# Patient Record
Sex: MALE | Race: WHITE | HISPANIC OR LATINO | Employment: UNEMPLOYED | URBAN - METROPOLITAN AREA
[De-identification: names, ages, dates, MRNs, and addresses within clinical notes are randomized per-mention and may not be internally consistent; named-entity substitution may affect disease eponyms.]

---

## 2019-01-02 ENCOUNTER — APPOINTMENT (EMERGENCY)
Dept: CT IMAGING | Facility: HOSPITAL | Age: 49
End: 2019-01-02
Payer: COMMERCIAL

## 2019-01-02 ENCOUNTER — HOSPITAL ENCOUNTER (EMERGENCY)
Facility: HOSPITAL | Age: 49
Discharge: HOME | End: 2019-01-02
Attending: EMERGENCY MEDICINE | Admitting: EMERGENCY MEDICINE
Payer: COMMERCIAL

## 2019-01-02 ENCOUNTER — APPOINTMENT (EMERGENCY)
Dept: RADIOLOGY | Facility: HOSPITAL | Age: 49
End: 2019-01-02
Payer: COMMERCIAL

## 2019-01-02 VITALS
BODY MASS INDEX: 34.02 KG/M2 | SYSTOLIC BLOOD PRESSURE: 140 MMHG | RESPIRATION RATE: 20 BRPM | TEMPERATURE: 99.6 F | HEART RATE: 94 BPM | DIASTOLIC BLOOD PRESSURE: 73 MMHG | HEIGHT: 64 IN | OXYGEN SATURATION: 95 % | WEIGHT: 199.3 LBS

## 2019-01-02 DIAGNOSIS — R05.9 COUGH: ICD-10-CM

## 2019-01-02 DIAGNOSIS — M79.10 MYALGIA: ICD-10-CM

## 2019-01-02 DIAGNOSIS — R51.9 HEADACHE: ICD-10-CM

## 2019-01-02 DIAGNOSIS — J11.1 INFLUENZA: Primary | ICD-10-CM

## 2019-01-02 PROCEDURE — 96375 TX/PRO/DX INJ NEW DRUG ADDON: CPT

## 2019-01-02 PROCEDURE — 96374 THER/PROPH/DIAG INJ IV PUSH: CPT

## 2019-01-02 PROCEDURE — 71046 X-RAY EXAM CHEST 2 VIEWS: CPT

## 2019-01-02 PROCEDURE — 99284 EMERGENCY DEPT VISIT MOD MDM: CPT

## 2019-01-02 PROCEDURE — 96361 HYDRATE IV INFUSION ADD-ON: CPT

## 2019-01-02 PROCEDURE — 70450 CT HEAD/BRAIN W/O DYE: CPT

## 2019-01-02 RX ORDER — OSELTAMIVIR PHOSPHATE 75 MG/1
75 CAPSULE ORAL EVERY 12 HOURS
Qty: 10 CAPSULE | Refills: 0 | Status: SHIPPED | OUTPATIENT
Start: 2019-01-02 | End: 2019-01-07

## 2019-01-02 RX ORDER — METOCLOPRAMIDE HYDROCHLORIDE 5 MG/ML
10 INJECTION INTRAMUSCULAR; INTRAVENOUS ONCE
Status: COMPLETED | OUTPATIENT
Start: 2019-01-02 | End: 2019-01-02

## 2019-01-02 RX ORDER — METOCLOPRAMIDE 10 MG/1
10 TABLET ORAL EVERY 6 HOURS
Qty: 30 TABLET | Refills: 0 | Status: SHIPPED | OUTPATIENT
Start: 2019-01-02 | End: 2020-10-20

## 2019-01-02 RX ORDER — DIPHENHYDRAMINE HYDROCHLORIDE 50 MG/ML
50 INJECTION INTRAMUSCULAR; INTRAVENOUS ONCE
Status: COMPLETED | OUTPATIENT
Start: 2019-01-02 | End: 2019-01-02

## 2019-01-02 RX ORDER — KETOROLAC TROMETHAMINE 30 MG/ML
15 INJECTION, SOLUTION INTRAMUSCULAR; INTRAVENOUS ONCE
Status: COMPLETED | OUTPATIENT
Start: 2019-01-02 | End: 2019-01-02

## 2019-01-02 RX ADMIN — KETOROLAC TROMETHAMINE 15 MG: 30 INJECTION, SOLUTION INTRAMUSCULAR at 17:21

## 2019-01-02 RX ADMIN — SODIUM CHLORIDE 1000 ML: 0.9 INJECTION, SOLUTION INTRAVENOUS at 17:20

## 2019-01-02 RX ADMIN — METOCLOPRAMIDE 10 MG: 5 INJECTION, SOLUTION INTRAMUSCULAR; INTRAVENOUS at 17:24

## 2019-01-02 RX ADMIN — DIPHENHYDRAMINE HYDROCHLORIDE 50 MG: 50 INJECTION INTRAMUSCULAR; INTRAVENOUS at 17:20

## 2019-01-02 NOTE — DISCHARGE INSTRUCTIONS
Influenza   WHAT YOU NEED TO KNOW:   Influenza (the flu) is an infection caused by the influenza virus  The flu is easily spread when an infected person coughs, sneezes, or has close contact with others  You may be able to spread the flu to others for 1 week or longer after signs or symptoms appear  DISCHARGE INSTRUCTIONS:   Call 911 for any of the following:   · You have trouble breathing, and your lips look purple or blue  · You have a seizure  Return to the emergency department if:   · You are dizzy, or you are urinating less or not at all  · You have a headache with a stiff neck, and you feel tired or confused  · You have new pain or pressure in your chest     · Your symptoms, such as shortness of breath, vomiting, or diarrhea, get worse  · Your symptoms, such as fever and coughing, seem to get better, but then get worse  Contact your healthcare provider if:   · You have new muscle pain or weakness  · You have questions or concerns about your condition or care  Medicines: You may need any of the following:  · Acetaminophen  decreases pain and fever  It is available without a doctor's order  Ask how much to take and how often to take it  Follow directions  Acetaminophen can cause liver damage if not taken correctly  · NSAIDs , such as ibuprofen, help decrease swelling, pain, and fever  This medicine is available with or without a doctor's order  NSAIDs can cause stomach bleeding or kidney problems in certain people  If you take blood thinner medicine, always ask your healthcare provider if NSAIDs are safe for you  Always read the medicine label and follow directions  · Antivirals  help fight a viral infection  · Take your medicine as directed  Contact your healthcare provider if you think your medicine is not helping or if you have side effects  Tell him or her if you are allergic to any medicine  Keep a list of the medicines, vitamins, and herbs you take   Include the amounts, and when and why you take them  Bring the list or the pill bottles to follow-up visits  Carry your medicine list with you in case of an emergency  Rest  as much as you can to help you recover  Drink liquids as directed  to help prevent dehydration  Ask how much liquid to drink each day and which liquids are best for you  Prevent the spread of influenza:   · Wash your hands often  Use soap and water  Wash your hands after you use the bathroom, change a child's diapers, or sneeze  Wash your hands before you prepare or eat food  Use gel hand cleanser when soap and water are not available  Do not touch your eyes, nose, or mouth unless you have washed your hands first            · Cover your mouth when you sneeze or cough  Cough into a tissue or the bend of your arm  · Clean shared items with a germ-killing   Clean table surfaces, doorknobs, and light switches  Do not share towels, silverware, and dishes with people who are sick  Wash bed sheets, towels, silverware, and dishes with soap and water  · Wear a mask  over your mouth and nose if you are sick or are near anyone who is sick  · Stay away from others  if you are sick  · Influenza vaccine  helps prevent influenza (flu)  Everyone older than 6 months should get a yearly influenza vaccine  Get the vaccine as soon as it is available, usually in September or October each year  Follow up with your healthcare provider as directed:  Write down your questions so you remember to ask them during your visits  © 2017 [address] Information is for End User's use only and may not be sold, redistributed or otherwise used for commercial purposes  All illustrations and images included in CareNotes® are the copyrighted property of [employer] , [employer]  or Louis Knapp  The above information is an  only  It is not intended as medical advice for individual conditions or treatments   Talk to your doctor, nurse or pharmacist before following any medical regimen to see if it is safe and effective for you  Influenza   LO QUE NECESITA SABER:   La influenza (gripe) es miki infección provocada por el virus de la influenza  La gripe se propaga fácilmente cuando miki persona infectada tose, estornuda o tiene contacto cercano con otras personas  Usted podría propagar la gripe a otras personas por miki semana o más después de que aparezcan los signos o síntomas  INSTRUCCIONES SOBRE [facility]ITAL[facility]:   Llame al 911 en magen de presentar lo siguiente:   · Tiene dificultad para respirar, y kenya labios lucen púrpuras o azules  · Usted sufre miki convulsión  Regrese a la adan de emergencias si:   · Usted se siente mareado, orina poco o no orina  · Usted tiene dolor de pravin con rigidez en el darrick y se siente cansado o confundido  · Usted comienza a sentir dolor o presión en el pecho  · [place] síntomas, tierra falta de [place], vómito o [place], [place]  · [place] síntomas, tierra fiebre y tos, parecen mejorar zachary Gregoryswathi Almanza  Pregúntele a bonds Kunal Roses vitaminas y minerales son adecuados para usted  · Usted tiene dolor muscular o debilidad nuevos  · Usted tiene preguntas o inquietudes acerca de bonds condición o cuidado  Medicamentos:  Es posible que usted necesite alguno de los siguientes:  · El acetaminofén  rafael el dolor y baja la fiebre  Está disponible sin receta médica  Pregunte la cantidad y la frecuencia con que debe tomarlos  Orquidea Eric5  El acetaminofén puede causar daño en el hígado cuando no se tomer de forma correcta  · AINEs (Analgésicos antiinflamatorios no esteroides) tierra el ibuprofeno, ayudan a disminuir la inflamación, el dolor y la [place]  Juliana medicamento esta disponible con o sin miki receta médica  Los AINEs pueden causar sangrado estomacal o problemas renales en ciertas personas  Si usted tomer un medicamento anticoagulante, siempre pregúntele a bonds médico si los UZAIR son seguros para usted   Siempre mac la etiqueta de geovany medicamento y [place] instrucciones  · Los antivirales  ayudan a combatir miki infección viral     · [place] mirna medicamentos tierra se le haya indicado  Consulte con bonds médico si usted marisa que bonds medicamento no le está ayudando o si presenta efectos secundarios  Infórmele si es alérgico a cualquier medicamento  Mantenga miki lista actualizada de los [employer], las vitaminas y los productos herbales que tomer  Incluya los siguientes datos de los medicamentos: cantidad, frecuencia y motivo de administración  Traiga con usted la lista o los envases de la píldoras a mirna citas de seguimiento  Lleve la lista de los medicamentos con usted en magen de miki emergencia  Descanse  tanto tierra pueda para recuperarse  Consuma líquidos según le indicaron  para evitar la deshidratación  Pregunte cuánto líquido debe zoran cada día y cuáles líquidos son los más adecuados para usted  Prevenga la propagación de la gripe:   · Lávese las jacobo frecuentemente  Utilice agua y [place]  [employer] las jacobo después de usar el baño, cambiarle el pañal a un fei o estornudar  Lávese las jacobo antes de comer o preparar alimentos  Use un gel desinfectante si no tiene [place] y [place]  No se toque los ojos, la nariz o la boca a menos que se haya lavado las jacobo delonte  · Cúbrase la boca al toser o estornudar  Tosa en un pañuelo desechable o en pliegue de bonds brazo  · Limpie los artículos que se comparten con miki solución de limpieza troy gérmenes  [address] [place] ross y los interruptores jamil  No comparta toallas, cubiertos ni platos con personas con síntomas de miki enfermedad  Lave las sábanas, [place], cubiertos y vajilla con agua y Pillo  · Use miki mascarilla  para cubrir bonds boca y [place] and [place] si usted está enfermo o [place] persona cerca a usted tiene gripe  · Manténgase alejado de otros  si está enfermo       · La vacuna contra la gripe  ayuda a prevenir influenza (gripe)  Bernardo Underwood persona mayor de 6 meses de edad debería recibir miki vacuna contra la gripe anualmente  Reciba la vacuna tan pronto esté disponible, generalmente kody los meses de septiembre u [place]  Acuda a mirna consultas de control con hunt médico según le indicaron  Anote mirna preguntas para que se acuerde de hacerlas kody mirna visitas  © 2017 [address]  Information is for End User's use only and may not be sold, redistributed or otherwise used for commercial purposes  All illustrations and images included in CareNotes® are the copyrighted property of A D A M , Inc  or Louis Knapp  Esta información es sólo para uso en educación  Hunt intención no es darle un consejo médico sobre enfermedades o tratamientos  Colsulte con hunt Burlene Aurora farmacéutico antes de seguir cualquier régimen médico para saber si es seguro y efectivo para usted
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
## 2019-01-02 NOTE — ED NOTES
Pt and family requesting to speak with doctor regarding medications, also requesting MRI, and further testing  Dr David Freidman aware and will be in to speak with pt        Kathia Madrid RN  70/90/76 [phone]

## 2019-01-03 NOTE — ED NOTES
D/c reviewed with pt prior to discharge  Ambulatory off unit with steady gait        Hampton Cooks, RN  32/12/82 [phone]

## 2019-09-20 ENCOUNTER — TRANSCRIBE ORDERS (OUTPATIENT)
Dept: ADMINISTRATIVE | Facility: HOSPITAL | Age: 49
End: 2019-09-20

## 2019-09-20 DIAGNOSIS — S83.204A OTHER TEAR OF MENISCUS OF LEFT KNEE, UNSPECIFIED MENISCUS, UNSPECIFIED WHETHER OLD OR CURRENT TEAR, INITIAL ENCOUNTER: Primary | ICD-10-CM

## 2020-05-15 ENCOUNTER — TELEPHONE (OUTPATIENT)
Dept: UROLOGY | Facility: AMBULATORY SURGERY CENTER | Age: 50
End: 2020-05-15

## 2020-06-05 ENCOUNTER — HOSPITAL ENCOUNTER (OUTPATIENT)
Dept: NON INVASIVE DIAGNOSTICS | Facility: CLINIC | Age: 50
Discharge: HOME | End: 2020-06-05
Payer: COMMERCIAL

## 2020-06-05 ENCOUNTER — OFFICE VISIT (OUTPATIENT)
Dept: GASTROENTEROLOGY | Facility: CLINIC | Age: 50
End: 2020-06-05
Payer: COMMERCIAL

## 2020-06-05 ENCOUNTER — TRANSCRIBE ORDERS (OUTPATIENT)
Dept: ADMINISTRATIVE | Facility: HOSPITAL | Age: 50
End: 2020-06-05

## 2020-06-05 ENCOUNTER — TRANSCRIBE ORDERS (OUTPATIENT)
Dept: NON INVASIVE DIAGNOSTICS | Facility: CLINIC | Age: 50
End: 2020-06-05

## 2020-06-05 VITALS
BODY MASS INDEX: 30.07 KG/M2 | HEIGHT: 69 IN | WEIGHT: 203 LBS | SYSTOLIC BLOOD PRESSURE: 120 MMHG | DIASTOLIC BLOOD PRESSURE: 82 MMHG

## 2020-06-05 DIAGNOSIS — N40.3 NODULAR PROSTATE WITH URINARY OBSTRUCTION: ICD-10-CM

## 2020-06-05 DIAGNOSIS — Z01.818 PREOP EXAMINATION: ICD-10-CM

## 2020-06-05 DIAGNOSIS — N13.8 BPH WITH URINARY OBSTRUCTION: Primary | ICD-10-CM

## 2020-06-05 DIAGNOSIS — N40.1 BPH WITH URINARY OBSTRUCTION: Primary | ICD-10-CM

## 2020-06-05 DIAGNOSIS — Z11.59 SPECIAL SCREENING EXAMINATION FOR UNSPECIFIED VIRAL DISEASE: ICD-10-CM

## 2020-06-05 DIAGNOSIS — R19.4 CHANGE IN BOWEL HABITS: ICD-10-CM

## 2020-06-05 DIAGNOSIS — R19.7 DIARRHEA, UNSPECIFIED TYPE: ICD-10-CM

## 2020-06-05 DIAGNOSIS — Z01.818 PRE-OPERATIVE CLEARANCE: Primary | ICD-10-CM

## 2020-06-05 DIAGNOSIS — Z01.818 PRE-OPERATIVE CLEARANCE: ICD-10-CM

## 2020-06-05 DIAGNOSIS — R63.4 WEIGHT LOSS: ICD-10-CM

## 2020-06-05 DIAGNOSIS — R10.13 EPIGASTRIC PAIN: Primary | ICD-10-CM

## 2020-06-05 DIAGNOSIS — N13.8 NODULAR PROSTATE WITH URINARY OBSTRUCTION: ICD-10-CM

## 2020-06-05 DIAGNOSIS — K92.1 MELENA: ICD-10-CM

## 2020-06-05 PROCEDURE — 93005 ELECTROCARDIOGRAM TRACING: CPT

## 2020-06-05 PROCEDURE — 99244 OFF/OP CNSLTJ NEW/EST MOD 40: CPT | Performed by: INTERNAL MEDICINE

## 2020-06-05 RX ORDER — NAPROXEN 500 MG/1
500 TABLET ORAL 2 TIMES DAILY
COMMUNITY
Start: 2020-05-18 | End: 2021-11-17

## 2020-06-05 RX ORDER — GABAPENTIN 100 MG/1
100 CAPSULE ORAL 2 TIMES DAILY
COMMUNITY
Start: 2020-05-19 | End: 2020-10-20

## 2020-06-05 RX ORDER — ALPRAZOLAM 0.25 MG/1
0.25 TABLET ORAL
COMMUNITY
Start: 2020-06-03 | End: 2022-05-25

## 2020-06-05 RX ORDER — TAMSULOSIN HYDROCHLORIDE 0.4 MG/1
CAPSULE ORAL
COMMUNITY
Start: 2020-05-20 | End: 2020-10-20

## 2020-06-05 RX ORDER — SILODOSIN 8 MG/1
8 CAPSULE ORAL DAILY
COMMUNITY
Start: 2020-05-28 | End: 2020-10-20

## 2020-06-05 RX ORDER — LISINOPRIL 20 MG/1
20 TABLET ORAL DAILY
COMMUNITY
Start: 2020-05-18 | End: 2022-05-25

## 2020-06-05 RX ORDER — CHLORTHALIDONE 25 MG/1
25 TABLET ORAL DAILY
COMMUNITY
Start: 2020-03-29 | End: 2020-10-20

## 2020-06-05 RX ORDER — DULOXETIN HYDROCHLORIDE 20 MG/1
20 CAPSULE, DELAYED RELEASE ORAL DAILY
COMMUNITY
Start: 2020-05-29 | End: 2020-10-20

## 2020-06-05 RX ORDER — ATORVASTATIN CALCIUM 20 MG/1
20 TABLET, FILM COATED ORAL DAILY
COMMUNITY
Start: 2020-05-18 | End: 2020-10-20

## 2020-06-06 LAB
ATRIAL RATE: 60 BPM
P AXIS: 25 DEGREES
PR INTERVAL: 192 MS
QRS AXIS: -19 DEGREES
QRSD INTERVAL: 122 MS
QT INTERVAL: 402 MS
QTC INTERVAL: 402 MS
T WAVE AXIS: 8 DEGREES
VENTRICULAR RATE: 60 BPM

## 2020-06-06 PROCEDURE — 93010 ELECTROCARDIOGRAM REPORT: CPT | Performed by: INTERNAL MEDICINE

## 2020-06-16 ENCOUNTER — TELEPHONE (OUTPATIENT)
Dept: GASTROENTEROLOGY | Facility: CLINIC | Age: 50
End: 2020-06-16

## 2020-08-06 ENCOUNTER — TELEPHONE (OUTPATIENT)
Dept: UROLOGY | Facility: MEDICAL CENTER | Age: 50
End: 2020-08-06

## 2020-08-06 NOTE — TELEPHONE ENCOUNTER
Please Triage -   New Patient-     What is the reason for the patients appointment? Difficulty urinating and burning       Do we accept the patient's insurance or is the patient Self-Pay? Provider:  Dayday Garcia (caller did not have information at time of call, but will bring at visit)  Plan:   Member ID#:   Group#:  Subscriber:   Phone#:  Location:      Has the patient had any previous urologist(s)? Yes  [facility]       Have patient records been requested? Epic       Has the patient had any outside testing done? Epic       What is the patients location preference for an office visit? [place]      Does the patient have a personal history of cancer? no      (If no cancer hx   ) Is the patient ok with seeing Advanced Practitioner? yes      Patient can be reached at : [phone]

## 2020-08-06 NOTE — TELEPHONE ENCOUNTER
Called and spoke to wife, Hortensia Erickson, and asked to speak to the patient  Hortensia Erickson stated she wanted to speak to me instead of  Her  because he does not speak [place]  Unfortunately, I could not speak to her because a medical communication consent form was not filled out  She provided me with the patient's cell phone number  I called the patient using the  service line ( # [phone])  Patient was an established patient with [facility] urology group, where he was diagnosed with having BPH with urinary obstruction  He recently had surgery on 06/10 through [facility]  Patient stated he had a second surgery performed at [facility] - since I could not see it in his chart, I asked the patient to send us his second urological surgery  Since having the second surgery, the patient stated he has a burning sensation when urinating (9/10 on pain scale), weak stream and frequency and retention, some bleeding with "black specs " He also has been experiencing fevers "every day since the surgery" but has not checked his temperature with a thermometer  He is not taking any antibiotics or pain killers

## 2020-08-07 NOTE — TELEPHONE ENCOUNTER
Called and spoke with patient's wife  She states the patient had urine testing done last week  She is going to have the patients records and results faxed over to us  Fax number given to patients wife  Also instructed wife/patient to take his temp to see if he does have a fever

## 2020-08-11 NOTE — TELEPHONE ENCOUNTER
Patient's urine culture from 07/30/2020 was negative for growth  Patient should be scheduled for a 30 minute new patient appointment for transfer of care  If having postoperative difficulties from recent TURP performed with Dr Sabra Nunez at [facility], will need to follow with them until we can see them in consultation

## 2020-08-11 NOTE — TELEPHONE ENCOUNTER
Patient previously of [address] [facility]  He had TURP done on 6/10/20  Recent urine testing in Epic is negative  Please review appointment time frame and type for this patient's complaints of continued burning with urination after procedure and LUTS

## 2020-08-11 NOTE — TELEPHONE ENCOUNTER
Called and spoke with patient's wife at this time  Advised recent urine culture was negative  We will schedule for next available appointment with MD  In the meantime if having issues post procedure they should follow up with [facility] until we can see patient  She verbalized understanding  9/14/20 appointment offered with Dr Baron Fraction  She declined and stated patient only wants to see Dr Yarelis Wang  Advised his next appointment is on 9/22/20   This was scheduled and she requested this be put on wait list  Appointment placed on wait list per request

## 2020-08-11 NOTE — TELEPHONE ENCOUNTER
Pt's wife checking status of appointment request she states she had come to [facility] office 08/10/20 with records

## 2020-09-22 ENCOUNTER — OFFICE VISIT (OUTPATIENT)
Dept: UROLOGY | Facility: CLINIC | Age: 50
End: 2020-09-22
Payer: COMMERCIAL

## 2020-09-22 VITALS
SYSTOLIC BLOOD PRESSURE: 128 MMHG | WEIGHT: 209 LBS | TEMPERATURE: 98.3 F | DIASTOLIC BLOOD PRESSURE: 82 MMHG | HEART RATE: 88 BPM | HEIGHT: 69 IN | BODY MASS INDEX: 30.96 KG/M2

## 2020-09-22 DIAGNOSIS — N40.0 BENIGN PROSTATIC HYPERPLASIA, UNSPECIFIED WHETHER LOWER URINARY TRACT SYMPTOMS PRESENT: Primary | ICD-10-CM

## 2020-09-22 LAB — POST-VOID RESIDUAL VOLUME, ML POC: 10 ML

## 2020-09-22 PROCEDURE — 51798 US URINE CAPACITY MEASURE: CPT | Performed by: UROLOGY

## 2020-09-22 PROCEDURE — 99205 OFFICE O/P NEW HI 60 MIN: CPT | Performed by: UROLOGY

## 2020-09-22 NOTE — PROGRESS NOTES
Referring Physician: Stacey Roberts MD  A copy of this note was sent to the referring physician  Diagnoses and all orders for this visit:    Benign prostatic hyperplasia, unspecified whether lower urinary tract symptoms present  -     POCT Measure PVR  -     Case request operating room: CYSTOSCOPY, DIRECT VISUAL INTERAL URETHROTOMY (DVIU); Standing  -     Case request operating room: CYSTOSCOPY, DIRECT VISUAL INTERAL URETHROTOMY (DVIU)    Other orders  -     Diet NPO; Sips with meds; Standing  -     Place sequential compression device; Standing  -     ceFAZolin (ANCEF) 2,000 mg in dextrose 5 % 100 mL IVPB            Assessment and plan:       1  BPH  -status post HoLEP (DR Mackenzie Lorenz, June 2020)  -13 g resected adenoma, benign pathology  - complicated by postoperative hematuria requiring clot evacuation    2  Postoperative fossa navicularis stricture  -status post office based dilation (Dr Chastity Yoder)     Long discussion with the patient his elmer wife  I reviewed his extensive medical records regarding preoperative evaluation, surgical intervention for his early onset low volume BPH in the form of a holmium laser enucleation of the prostate performed by Dr Mackenzie Lorenz at [facility] in June of 2020  We reviewed his postoperative course which included hematuria requiring cystoscopic clot evacuation, as well as development of a fossa navicularis stricture postoperatively which was dilated in an office setting    Patient remains symptomatic with weakening of his urinary stream, significant urinary frequency  Patient his wife have significant anxiety regarding his urinary health, possibility of recurrent urethral stricture disease    I have therefore recommended a staged evaluation to include cystoscopy performed under anesthesia, to evaluate the status of his urethra and bladder outlet  Discussed that if recurrent urethral stricture ring is identified it will be both staged as well as dilated  Discussed the going forward he may require medical management for bladder overactivity which I suspect and possibility of referral to a reconstructive urologist for potential urethral stricture repair pending the results of his upcoming cystoscopy    Patient has provided informed consent for cystoscopy under anesthesia with possible direct vision internal urethrotomy/dilation of urethral stricture  This will be scheduled in our Ambulatory surgery Center in the near future    I have spent 45 minutes with Patient and family today in which greater than 50% of this time was spent in counseling/coordination of care regarding Diagnostic results, Prognosis, Risks and benefits of tx options and Impressions  Shemar Phelan MD      Chief Complaint     Transfer of care BPH      History of Present Illness     Ervin Underwood is a 48 y o  presents seeking an additional opinion for early onset BPH  This is a 63-year-old male reports a multiyear history of urinary frequency urgency weak urinary stream   He was previously managed by Dr Ninoska Stephens at [facility]  He ultimately underwent a holmium laser enucleation of the prostate performed in June of 2020  That procedure was complicated by postoperative hematuria requiring cystoscopy and clot evacuation  Patient had stress urinary continence postoperatively as well  He also developed worsening obstructive symptoms, and ultimately underwent dilation of a fossa navicularis stricture in the office setting approximately 6 weeks postoperatively    Patient remains highly bothered by the status of his lower urinary tract symptoms  He reports significant urinary frequency and urgency during the day  He does report persistent stress urinary incontinence which he feels improving slowly  He also feels that his stream is weakening  He has had no gross hematuria  He has had no recent urinary tract infections    He presents accompanied by his wife      Detailed Urologic History     - please refer to HPI    Review of Systems     Review of Systems   Constitutional: Negative for activity change and fatigue  HENT: Negative for congestion  Eyes: Negative for visual disturbance  Respiratory: Negative for shortness of breath and wheezing  Cardiovascular: Negative for chest pain and leg swelling  Gastrointestinal: Negative for abdominal pain  Endocrine: Positive for polyuria  Genitourinary: Positive for dysuria and frequency  Negative for flank pain, hematuria and urgency  Musculoskeletal: Negative for back pain  Allergic/Immunologic: Negative for immunocompromised state  Neurological: Negative for dizziness and numbness  Psychiatric/Behavioral: Negative for dysphoric mood  All other systems reviewed and are negative  Allergies     No Known Allergies    Physical Exam     Physical Exam  Constitutional:       General: He is not in acute distress  Appearance: He is well-developed  HENT:      Head: Normocephalic and atraumatic  Neck:      Musculoskeletal: Normal range of motion  Cardiovascular:      Comments: Negative lower extremity edema  Pulmonary:      Effort: Pulmonary effort is normal       Breath sounds: Normal breath sounds  Abdominal:      Palpations: Abdomen is soft  Genitourinary:     Comments: Negative CVA tenderness, suprapubic tenderness  Musculoskeletal: Normal range of motion  Skin:     General: Skin is warm  Neurological:      Mental Status: He is alert and oriented to person, place, and time     Psychiatric:         Behavior: Behavior normal              Vital Signs  Vitals:    09/22/20 1308   BP: 128/82   Pulse: 88   Temp: 98 3 °F (36 8 °C)   Weight: 94 8 kg (209 lb)   Height: 5' 8 5" (1 74 m)         Current Medications       Current Outpatient Medications:     atorvastatin (LIPITOR) 20 mg tablet, Take 20 mg by mouth daily, Disp: , Rfl:     lisinopril (ZESTRIL) 20 mg tablet, Take 20 mg by mouth daily, Disp: , Rfl:     ALPRAZolam (XANAX) 0 25 mg tablet, Take 0 25 mg by mouth daily at bedtime as needed, Disp: , Rfl:     chlorthalidone 25 mg tablet, Take 25 mg by mouth daily, Disp: , Rfl:     DULoxetine (CYMBALTA) 20 mg capsule, Take 20 mg by mouth daily, Disp: , Rfl:     gabapentin (NEURONTIN) 100 mg capsule, Take 100 mg by mouth 2 (two) times a day, Disp: , Rfl:     metoclopramide (REGLAN) 10 mg tablet, Take 1 tablet (10 mg total) by mouth every 6 (six) hours (Patient not taking: Reported on 9/22/2020), Disp: 30 tablet, Rfl: 0    naproxen (NAPROSYN) 500 mg tablet, Take 500 mg by mouth 2 (two) times a day, Disp: , Rfl:     oxaprozin (DAYPRO) 600 MG tablet, Take 600 mg by mouth Three times a day, Disp: , Rfl:     Silodosin 8 MG CAPS, Take 8 mg by mouth daily, Disp: , Rfl:     tamsulosin (FLOMAX) 0 4 mg, take 1 capsule by mouth nightly, Disp: , Rfl:       Active Problems     There is no problem list on file for this patient          Past Medical History     Past Medical History:   Diagnosis Date    Enlarged prostate     Hyperlipidemia     Hypertension     Neuropathy          Surgical History     Past Surgical History:   Procedure Laterality Date    KNEE ARTHROSCOPY Right     TRANSURETHRAL RESECTION OF PROSTATE           Family History     Family History   Problem Relation Age of Onset    No Known Problems Mother          Social History     Social History     Social History     Tobacco Use   Smoking Status Never Smoker   Smokeless Tobacco Never Used         Pertinent Lab Values     No results found for: CREATININE    No results found for: PSA    @RESULTRCNT(1H])@      Pertinent Imaging      - n/a    Portions of the record may have been created with voice recognition software   Occasional wrong word or "sound a like" substitutions may have occurred due to the inherent limitations of voice recognition software   Read the chart carefully and recognize, using context, where substitutions have occurred

## 2020-09-23 ENCOUNTER — TELEPHONE (OUTPATIENT)
Dept: UROLOGY | Facility: MEDICAL CENTER | Age: 50
End: 2020-09-23

## 2020-09-23 ENCOUNTER — TELEPHONE (OUTPATIENT)
Dept: UROLOGY | Facility: AMBULATORY SURGERY CENTER | Age: 50
End: 2020-09-23

## 2020-09-23 NOTE — TELEPHONE ENCOUNTER
I confirmed surgery date 10/23/20, at [facility], with Fernando's wife, Dorothea Blanco  She understands that he'll receive a phone call the day before, between 3-7, with his arrival time and where to report and will be instructed as to when to stop eating and drinking    Confirmation email, with address of facility, sent to [email or web address]

## 2020-09-28 NOTE — TELEPHONE ENCOUNTER
Received fax from [facility]: OSMANY PALOMINO, [id number] and [id number] approved, case #[id number], 10/23/20-12/23/20

## 2020-09-28 NOTE — TELEPHONE ENCOUNTER
TB Please call to address  Patient's wife called to request a sooner appointment      Clinical team please call to address   He is also requesting a medication to  Slow the urine  She requested Flex Bolaños, patient's wife, can be reached at [phone]  Thank you

## 2020-09-28 NOTE — TELEPHONE ENCOUNTER
I left a message for [facility] that 10/23/20 is the soonest I can schedule Moriah Louis, as we are now scheduling into November  I told her that if there are any cancellations, I cannot promise there will be, that I'll call her

## 2020-09-29 NOTE — TELEPHONE ENCOUNTER
Patient's wife, Nae Leon, called in regard to a sooner appointment for surgery  They are willing to go to an alternate hospital as well  Requested a discussion with Dr Dottie Echevarria to discuss this as well    Please call at [phone]  Thank you

## 2020-09-29 NOTE — TELEPHONE ENCOUNTER
I spoke with Dr Camille Tai and 10/23/20 will have to be surgery date due to no other MD or OR available before  I told this to Milton Teague and she wanted to know if he went to the ED, if Dr Camille Tai would be there  I told her that it would be the doctor on call and this is an elective surgery, so, they would probably put a catheter in and send him home  She said that's what [facility] did to him and she stated that "we'll see if he makes it", then hung up

## 2020-10-09 ENCOUNTER — ANESTHESIA EVENT (OUTPATIENT)
Dept: PERIOP | Facility: AMBULARY SURGERY CENTER | Age: 50
End: 2020-10-09
Payer: COMMERCIAL

## 2020-10-23 ENCOUNTER — ANESTHESIA (OUTPATIENT)
Dept: PERIOP | Facility: AMBULARY SURGERY CENTER | Age: 50
End: 2020-10-23
Payer: COMMERCIAL

## 2020-10-23 ENCOUNTER — TELEPHONE (OUTPATIENT)
Dept: UROLOGY | Facility: CLINIC | Age: 50
End: 2020-10-23

## 2020-10-23 ENCOUNTER — HOSPITAL ENCOUNTER (OUTPATIENT)
Facility: AMBULARY SURGERY CENTER | Age: 50
Setting detail: OUTPATIENT SURGERY
Discharge: HOME | End: 2020-10-23
Attending: UROLOGY | Admitting: UROLOGY
Payer: COMMERCIAL

## 2020-10-23 VITALS — HEART RATE: 65 BPM

## 2020-10-23 VITALS
RESPIRATION RATE: 18 BRPM | DIASTOLIC BLOOD PRESSURE: 79 MMHG | TEMPERATURE: 98.5 F | BODY MASS INDEX: 34.66 KG/M2 | WEIGHT: 208 LBS | SYSTOLIC BLOOD PRESSURE: 162 MMHG | HEIGHT: 65 IN | HEART RATE: 81 BPM | OXYGEN SATURATION: 98 %

## 2020-10-23 DIAGNOSIS — N99.114 POSTPROCEDURAL MALE URETHRAL STRICTURE: Primary | ICD-10-CM

## 2020-10-23 PROBLEM — I10 HTN (HYPERTENSION): Status: ACTIVE | Noted: 2020-10-23

## 2020-10-23 PROBLEM — E78.5 HYPERLIPIDEMIA: Status: ACTIVE | Noted: 2020-10-23

## 2020-10-23 PROBLEM — N40.0 BPH (BENIGN PROSTATIC HYPERPLASIA): Status: ACTIVE | Noted: 2020-10-23

## 2020-10-23 PROBLEM — N42.9 PROSTATIC DISORDER: Status: ACTIVE | Noted: 2020-10-23

## 2020-10-23 PROCEDURE — 52276 CYSTOSCOPY AND TREATMENT: CPT | Performed by: UROLOGY

## 2020-10-23 PROCEDURE — NC001 PR NO CHARGE: Performed by: UROLOGY

## 2020-10-23 PROCEDURE — C1769 GUIDE WIRE: HCPCS | Performed by: UROLOGY

## 2020-10-23 RX ORDER — PHENAZOPYRIDINE HYDROCHLORIDE 200 MG/1
200 TABLET, FILM COATED ORAL 3 TIMES DAILY PRN
Qty: 10 TABLET | Refills: 0 | Status: SHIPPED | OUTPATIENT
Start: 2020-10-23 | End: 2020-10-26

## 2020-10-23 RX ORDER — ACETAMINOPHEN 325 MG/1
650 TABLET ORAL EVERY 4 HOURS PRN
Qty: 30 TABLET | Refills: 0 | Status: ON HOLD
Start: 2020-10-23 | End: 2022-06-02

## 2020-10-23 RX ORDER — DOCUSATE SODIUM 100 MG/1
100 CAPSULE, LIQUID FILLED ORAL 2 TIMES DAILY
Qty: 30 CAPSULE | Refills: 0 | Status: SHIPPED | OUTPATIENT
Start: 2020-10-23 | End: 2021-11-19

## 2020-10-23 RX ORDER — MIDAZOLAM HYDROCHLORIDE 2 MG/2ML
INJECTION, SOLUTION INTRAMUSCULAR; INTRAVENOUS AS NEEDED
Status: DISCONTINUED | OUTPATIENT
Start: 2020-10-23 | End: 2020-10-23

## 2020-10-23 RX ORDER — OXYCODONE HYDROCHLORIDE 5 MG/1
5 TABLET ORAL EVERY 4 HOURS PRN
Status: DISCONTINUED | OUTPATIENT
Start: 2020-10-23 | End: 2020-10-23 | Stop reason: HOSPADM

## 2020-10-23 RX ORDER — OXYCODONE HYDROCHLORIDE 5 MG/1
5 TABLET ORAL EVERY 4 HOURS PRN
Qty: 5 TABLET | Refills: 0 | Status: SHIPPED | OUTPATIENT
Start: 2020-10-23 | End: 2020-10-28

## 2020-10-23 RX ORDER — ONDANSETRON 2 MG/ML
INJECTION INTRAMUSCULAR; INTRAVENOUS AS NEEDED
Status: DISCONTINUED | OUTPATIENT
Start: 2020-10-23 | End: 2020-10-23

## 2020-10-23 RX ORDER — SODIUM CHLORIDE, SODIUM LACTATE, POTASSIUM CHLORIDE, CALCIUM CHLORIDE 600; 310; 30; 20 MG/100ML; MG/100ML; MG/100ML; MG/100ML
125 INJECTION, SOLUTION INTRAVENOUS CONTINUOUS
Status: DISCONTINUED | OUTPATIENT
Start: 2020-10-23 | End: 2020-10-23 | Stop reason: HOSPADM

## 2020-10-23 RX ORDER — FENTANYL CITRATE 50 UG/ML
INJECTION, SOLUTION INTRAMUSCULAR; INTRAVENOUS AS NEEDED
Status: DISCONTINUED | OUTPATIENT
Start: 2020-10-23 | End: 2020-10-23

## 2020-10-23 RX ORDER — CEFAZOLIN SODIUM 2 G/50ML
2000 SOLUTION INTRAVENOUS ONCE
Status: COMPLETED | OUTPATIENT
Start: 2020-10-23 | End: 2020-10-23

## 2020-10-23 RX ORDER — FENTANYL CITRATE/PF 50 MCG/ML
25 SYRINGE (ML) INJECTION
Status: DISCONTINUED | OUTPATIENT
Start: 2020-10-23 | End: 2020-10-23 | Stop reason: HOSPADM

## 2020-10-23 RX ORDER — CEPHALEXIN 500 MG/1
500 CAPSULE ORAL EVERY 6 HOURS SCHEDULED
Qty: 3 CAPSULE | Refills: 0 | Status: SHIPPED | OUTPATIENT
Start: 2020-10-23 | End: 2020-10-24

## 2020-10-23 RX ORDER — MAGNESIUM HYDROXIDE 1200 MG/15ML
LIQUID ORAL AS NEEDED
Status: DISCONTINUED | OUTPATIENT
Start: 2020-10-23 | End: 2020-10-23 | Stop reason: HOSPADM

## 2020-10-23 RX ORDER — LIDOCAINE HYDROCHLORIDE 10 MG/ML
INJECTION, SOLUTION EPIDURAL; INFILTRATION; INTRACAUDAL; PERINEURAL AS NEEDED
Status: DISCONTINUED | OUTPATIENT
Start: 2020-10-23 | End: 2020-10-23

## 2020-10-23 RX ORDER — LIDOCAINE HYDROCHLORIDE 10 MG/ML
0.5 INJECTION, SOLUTION EPIDURAL; INFILTRATION; INTRACAUDAL; PERINEURAL ONCE AS NEEDED
Status: DISCONTINUED | OUTPATIENT
Start: 2020-10-23 | End: 2020-10-23 | Stop reason: HOSPADM

## 2020-10-23 RX ORDER — KETOROLAC TROMETHAMINE 30 MG/ML
INJECTION, SOLUTION INTRAMUSCULAR; INTRAVENOUS AS NEEDED
Status: DISCONTINUED | OUTPATIENT
Start: 2020-10-23 | End: 2020-10-23

## 2020-10-23 RX ORDER — GABAPENTIN 300 MG/1
300 CAPSULE ORAL 3 TIMES DAILY PRN
Qty: 15 CAPSULE | Refills: 0 | Status: SHIPPED | OUTPATIENT
Start: 2020-10-23 | End: 2022-05-25

## 2020-10-23 RX ORDER — DEXAMETHASONE SODIUM PHOSPHATE 10 MG/ML
INJECTION, SOLUTION INTRAMUSCULAR; INTRAVENOUS AS NEEDED
Status: DISCONTINUED | OUTPATIENT
Start: 2020-10-23 | End: 2020-10-23

## 2020-10-23 RX ORDER — OXYBUTYNIN CHLORIDE 5 MG/1
5 TABLET ORAL EVERY 6 HOURS PRN
Qty: 30 TABLET | Refills: 0 | Status: SHIPPED | OUTPATIENT
Start: 2020-10-23 | End: 2022-05-25

## 2020-10-23 RX ORDER — MEPERIDINE HYDROCHLORIDE 25 MG/ML
25 INJECTION INTRAMUSCULAR; INTRAVENOUS; SUBCUTANEOUS ONCE AS NEEDED
Status: DISCONTINUED | OUTPATIENT
Start: 2020-10-23 | End: 2020-10-23 | Stop reason: HOSPADM

## 2020-10-23 RX ORDER — EPHEDRINE SULFATE 50 MG/ML
INJECTION INTRAVENOUS AS NEEDED
Status: DISCONTINUED | OUTPATIENT
Start: 2020-10-23 | End: 2020-10-23

## 2020-10-23 RX ORDER — PROPOFOL 10 MG/ML
INJECTION, EMULSION INTRAVENOUS AS NEEDED
Status: DISCONTINUED | OUTPATIENT
Start: 2020-10-23 | End: 2020-10-23

## 2020-10-23 RX ADMIN — FENTANYL CITRATE 25 MCG: 50 INJECTION INTRAMUSCULAR; INTRAVENOUS at 14:19

## 2020-10-23 RX ADMIN — SODIUM CHLORIDE, SODIUM LACTATE, POTASSIUM CHLORIDE, AND CALCIUM CHLORIDE: .6; .31; .03; .02 INJECTION, SOLUTION INTRAVENOUS at 10:12

## 2020-10-23 RX ADMIN — MIDAZOLAM HYDROCHLORIDE 2 MG: 1 INJECTION, SOLUTION INTRAMUSCULAR; INTRAVENOUS at 12:55

## 2020-10-23 RX ADMIN — FENTANYL CITRATE 25 MCG: 50 INJECTION INTRAMUSCULAR; INTRAVENOUS at 14:23

## 2020-10-23 RX ADMIN — PROPOFOL 200 MG: 10 INJECTION, EMULSION INTRAVENOUS at 12:58

## 2020-10-23 RX ADMIN — FENTANYL CITRATE 50 MCG: 50 INJECTION, SOLUTION INTRAMUSCULAR; INTRAVENOUS at 13:46

## 2020-10-23 RX ADMIN — DEXAMETHASONE SODIUM PHOSPHATE 4 MG: 10 INJECTION, SOLUTION INTRAMUSCULAR; INTRAVENOUS at 13:00

## 2020-10-23 RX ADMIN — FENTANYL CITRATE 50 MCG: 50 INJECTION, SOLUTION INTRAMUSCULAR; INTRAVENOUS at 13:11

## 2020-10-23 RX ADMIN — LIDOCAINE HYDROCHLORIDE 50 MG: 10 INJECTION, SOLUTION EPIDURAL; INFILTRATION; INTRACAUDAL at 12:58

## 2020-10-23 RX ADMIN — FENTANYL CITRATE 25 MCG: 50 INJECTION, SOLUTION INTRAMUSCULAR; INTRAVENOUS at 13:31

## 2020-10-23 RX ADMIN — EPHEDRINE SULFATE 10 MG: 50 INJECTION, SOLUTION INTRAVENOUS at 13:15

## 2020-10-23 RX ADMIN — FENTANYL CITRATE 25 MCG: 50 INJECTION, SOLUTION INTRAMUSCULAR; INTRAVENOUS at 12:58

## 2020-10-23 RX ADMIN — KETOROLAC TROMETHAMINE 30 MG: 30 INJECTION, SOLUTION INTRAMUSCULAR at 13:45

## 2020-10-23 RX ADMIN — ONDANSETRON 4 MG: 2 INJECTION INTRAMUSCULAR; INTRAVENOUS at 13:00

## 2020-10-23 RX ADMIN — EPHEDRINE SULFATE 10 MG: 50 INJECTION, SOLUTION INTRAVENOUS at 13:31

## 2020-10-23 RX ADMIN — CEFAZOLIN SODIUM 2000 MG: 2 SOLUTION INTRAVENOUS at 12:53

## 2020-10-28 ENCOUNTER — PROCEDURE VISIT (OUTPATIENT)
Dept: UROLOGY | Facility: CLINIC | Age: 50
End: 2020-10-28
Payer: COMMERCIAL

## 2020-10-28 VITALS — BODY MASS INDEX: 34.49 KG/M2 | HEIGHT: 65 IN | TEMPERATURE: 98.4 F | WEIGHT: 207 LBS

## 2020-10-28 DIAGNOSIS — N40.0 BENIGN PROSTATIC HYPERPLASIA, UNSPECIFIED WHETHER LOWER URINARY TRACT SYMPTOMS PRESENT: Primary | ICD-10-CM

## 2020-10-28 LAB — POST-VOID RESIDUAL VOLUME, ML POC: 6 ML

## 2020-10-28 PROCEDURE — 51798 US URINE CAPACITY MEASURE: CPT

## 2020-11-13 ENCOUNTER — TELEPHONE (OUTPATIENT)
Dept: UROLOGY | Facility: MEDICAL CENTER | Age: 50
End: 2020-11-13

## 2020-11-17 ENCOUNTER — PROCEDURE VISIT (OUTPATIENT)
Dept: UROLOGY | Facility: CLINIC | Age: 50
End: 2020-11-17
Payer: COMMERCIAL

## 2020-11-17 VITALS
WEIGHT: 213 LBS | DIASTOLIC BLOOD PRESSURE: 80 MMHG | BODY MASS INDEX: 35.49 KG/M2 | SYSTOLIC BLOOD PRESSURE: 166 MMHG | HEIGHT: 65 IN | HEART RATE: 88 BPM

## 2020-11-17 DIAGNOSIS — N40.0 BENIGN PROSTATIC HYPERPLASIA, UNSPECIFIED WHETHER LOWER URINARY TRACT SYMPTOMS PRESENT: Primary | ICD-10-CM

## 2020-11-17 LAB — POST-VOID RESIDUAL VOLUME, ML POC: 0 ML

## 2020-11-17 PROCEDURE — 51798 US URINE CAPACITY MEASURE: CPT | Performed by: PHYSICIAN ASSISTANT

## 2020-11-17 PROCEDURE — 99024 POSTOP FOLLOW-UP VISIT: CPT | Performed by: PHYSICIAN ASSISTANT

## 2020-11-17 RX ORDER — PHENAZOPYRIDINE HYDROCHLORIDE 100 MG/1
100 TABLET, FILM COATED ORAL 3 TIMES DAILY PRN
COMMUNITY
End: 2022-07-11 | Stop reason: SDUPTHER

## 2020-11-23 ENCOUNTER — TELEPHONE (OUTPATIENT)
Dept: UROLOGY | Facility: MEDICAL CENTER | Age: 50
End: 2020-11-23

## 2020-11-24 ENCOUNTER — PREP FOR PROCEDURE (OUTPATIENT)
Dept: INTERVENTIONAL RADIOLOGY/VASCULAR | Facility: CLINIC | Age: 50
End: 2020-11-24

## 2020-11-24 ENCOUNTER — TELEPHONE (OUTPATIENT)
Dept: UROLOGY | Facility: CLINIC | Age: 50
End: 2020-11-24

## 2020-11-24 DIAGNOSIS — N40.0 BENIGN PROSTATIC HYPERPLASIA, UNSPECIFIED WHETHER LOWER URINARY TRACT SYMPTOMS PRESENT: Primary | ICD-10-CM

## 2020-11-24 RX ORDER — SODIUM CHLORIDE 9 MG/ML
75 INJECTION, SOLUTION INTRAVENOUS CONTINUOUS
Status: CANCELLED | OUTPATIENT
Start: 2020-11-24

## 2020-12-07 ENCOUNTER — TRANSCRIBE ORDERS (OUTPATIENT)
Dept: NON INVASIVE DIAGNOSTICS | Facility: CLINIC | Age: 50
End: 2020-12-07

## 2020-12-07 ENCOUNTER — HOSPITAL ENCOUNTER (OUTPATIENT)
Dept: NON INVASIVE DIAGNOSTICS | Facility: CLINIC | Age: 50
Discharge: HOME | End: 2020-12-07
Payer: COMMERCIAL

## 2020-12-07 DIAGNOSIS — Z01.818 PRE-OPERATIVE CLEARANCE: ICD-10-CM

## 2020-12-07 DIAGNOSIS — Z01.818 PRE-OPERATIVE CLEARANCE: Primary | ICD-10-CM

## 2020-12-07 LAB
ATRIAL RATE: 77 BPM
P AXIS: 29 DEGREES
PR INTERVAL: 184 MS
QRS AXIS: 132 DEGREES
QRSD INTERVAL: 110 MS
QT INTERVAL: 392 MS
QTC INTERVAL: 443 MS
T WAVE AXIS: 11 DEGREES
VENTRICULAR RATE: 77 BPM

## 2020-12-07 PROCEDURE — 93005 ELECTROCARDIOGRAM TRACING: CPT

## 2020-12-07 PROCEDURE — 93010 ELECTROCARDIOGRAM REPORT: CPT | Performed by: INTERNAL MEDICINE

## 2021-01-06 ENCOUNTER — TRANSCRIBE ORDERS (OUTPATIENT)
Dept: ADMINISTRATIVE | Facility: HOSPITAL | Age: 51
End: 2021-01-06

## 2021-01-06 DIAGNOSIS — N35.919 URETHRAL STRICTURE IN MALE: ICD-10-CM

## 2021-01-06 DIAGNOSIS — N40.1 BENIGN PROSTATIC HYPERPLASIA WITH LOWER URINARY TRACT SYMPTOMS: ICD-10-CM

## 2021-01-06 DIAGNOSIS — N39.0 ACUTE UTI: Primary | ICD-10-CM

## 2021-01-07 ENCOUNTER — TRANSCRIBE ORDERS (OUTPATIENT)
Dept: ADMINISTRATIVE | Facility: HOSPITAL | Age: 51
End: 2021-01-07

## 2021-01-07 ENCOUNTER — HOSPITAL ENCOUNTER (OUTPATIENT)
Dept: ULTRASOUND IMAGING | Facility: CLINIC | Age: 51
End: 2021-01-07
Payer: COMMERCIAL

## 2021-01-07 ENCOUNTER — HOSPITAL ENCOUNTER (OUTPATIENT)
Dept: ULTRASOUND IMAGING | Facility: CLINIC | Age: 51
Discharge: HOME | End: 2021-01-07
Payer: COMMERCIAL

## 2021-01-07 DIAGNOSIS — N40.1 BENIGN PROSTATIC HYPERPLASIA WITH LOWER URINARY TRACT SYMPTOMS: ICD-10-CM

## 2021-01-07 PROCEDURE — 76770 US EXAM ABDO BACK WALL COMP: CPT

## 2021-02-17 ENCOUNTER — TELEPHONE (OUTPATIENT)
Dept: UROLOGY | Facility: CLINIC | Age: 51
End: 2021-02-17

## 2021-02-17 NOTE — TELEPHONE ENCOUNTER
Pt scheduled for uroflow/pvr tomorrow with Matthias Gravely    Please advise on new apt due to weather and only doing virtual   Thank you

## 2021-03-02 ENCOUNTER — OFFICE VISIT (OUTPATIENT)
Dept: UROLOGY | Facility: CLINIC | Age: 51
End: 2021-03-02
Payer: COMMERCIAL

## 2021-03-02 VITALS
SYSTOLIC BLOOD PRESSURE: 154 MMHG | WEIGHT: 216 LBS | HEIGHT: 65 IN | HEART RATE: 75 BPM | BODY MASS INDEX: 35.99 KG/M2 | DIASTOLIC BLOOD PRESSURE: 90 MMHG

## 2021-03-02 DIAGNOSIS — N40.0 BENIGN PROSTATIC HYPERPLASIA, UNSPECIFIED WHETHER LOWER URINARY TRACT SYMPTOMS PRESENT: Primary | ICD-10-CM

## 2021-03-02 DIAGNOSIS — N99.114 POSTPROCEDURAL STRICTURE OF ANTERIOR URETHRA: ICD-10-CM

## 2021-03-02 LAB
POST-VOID RESIDUAL VOLUME, ML POC: 8 ML
SL AMB  POCT GLUCOSE, UA: NORMAL
SL AMB LEUKOCYTE ESTERASE,UA: NORMAL
SL AMB POCT BILIRUBIN,UA: NORMAL
SL AMB POCT BLOOD,UA: NORMAL
SL AMB POCT CLARITY,UA: CLEAR
SL AMB POCT COLOR,UA: YELLOW
SL AMB POCT KETONES,UA: NORMAL
SL AMB POCT NITRITE,UA: NORMAL
SL AMB POCT PH,UA: 7
SL AMB POCT SPECIFIC GRAVITY,UA: 1.03
SL AMB POCT URINE PROTEIN: NORMAL
SL AMB POCT UROBILINOGEN: 0.2

## 2021-03-02 PROCEDURE — 99212 OFFICE O/P EST SF 10 MIN: CPT | Performed by: STUDENT IN AN ORGANIZED HEALTH CARE EDUCATION/TRAINING PROGRAM

## 2021-03-02 PROCEDURE — 51798 US URINE CAPACITY MEASURE: CPT | Performed by: STUDENT IN AN ORGANIZED HEALTH CARE EDUCATION/TRAINING PROGRAM

## 2021-03-02 PROCEDURE — 81002 URINALYSIS NONAUTO W/O SCOPE: CPT | Performed by: STUDENT IN AN ORGANIZED HEALTH CARE EDUCATION/TRAINING PROGRAM

## 2021-03-02 RX ORDER — ATORVASTATIN CALCIUM 10 MG/1
10 TABLET, FILM COATED ORAL DAILY
COMMUNITY
End: 2022-05-25

## 2021-03-02 NOTE — PROGRESS NOTES
Diagnoses and all orders for this visit:    Benign prostatic hyperplasia, unspecified whether lower urinary tract symptoms present  -     POCT urine dip  -     POCT Measure PVR    Postprocedural stricture of anterior urethra    Other orders  -     atorvastatin (LIPITOR) 10 mg tablet; Take 10 mg by mouth daily            Assessment and plan:     60-year-old male    1  He is status post dorsal onlay buccal mucosal urethroplasty by Dr Bo Alex @ [facility] 12/10/2020 for a penile bulbar stricture after a HoLEP/postoperative complications  His postoperative course was complicated by an acute urinary tract infection  His symptoms postoperatively include pain at his incision site as well as post micturition dribbling  It is reassuring that his postvoid residual is within normal limits and urine analysis does not show any signs of acute infection  Physical exam does not show any fluid collections or overlying skin changes  -   His pain manageable at this time   Antibiotics are not indicated as there is no signs of any acute infection  I recommend to follow up at [facility] as scheduled on March 29, 2021 for a cystoscopy  Triston Cameron MD      Chief Complaint     Chief Complaint   Patient presents with    Benign Prostatic Hypertrophy         History of Present Illness     Kalpesh Joshi is a 46 y o  5 cm dense penobulbar urethral stricture disease s/p dorsal onlay buccal mucosal urethroplasty by Dr Bo Alex @ [facility] 12/10/2020  He had his catheter removed postoperatively 1/4/2021 after having a normal VCUG/RUG per patient report (  I do not have access to the study)  He was then treated for an acute urinary tract infection  1/6/2021  He is having pain at his incision site  He has post void dribbling  He is scheduled to see Dr Bo Alex at [facility] 3/29/2021  He is accompanied by his wife today who help provide translation    They had many questions today regarding the etiology of his urethral stricture disease  He and his wife were especially concerned that if he would have had a prostatectomy inset of the HoLEP he would not have had  these complications  Detailed Urologic History     - please refer to HPI    Review of Systems     Review of Systems   Genitourinary:        Scrotal pain at his incision site  Post micturition dribbling   All other systems reviewed and are negative  Allergies     No Known Allergies    Physical Exam     Physical Exam  Vitals signs and nursing note reviewed  HENT:      Head: Normocephalic and atraumatic  Pulmonary:      Effort: Pulmonary effort is normal    Abdominal:      General: Abdomen is flat  Palpations: Abdomen is soft  Genitourinary:     Penis: Normal        Scrotum/Testes: Normal       Comments: Midline perineal incision has healed well  The scars present well healed  There is no overlying erythema, induration, or edema  There is no palpable fluid collections  He has mild tenderness to palpation over this incision  Musculoskeletal: Normal range of motion  Skin:     General: Skin is warm and dry               Vital Signs  Vitals:    03/02/21 1044   BP: 154/90   Pulse: 75   Weight: 98 kg (216 lb)   Height: 5' 5" (1 651 m)         Current Medications       Current Outpatient Medications:     acetaminophen (TYLENOL) 325 mg tablet, Take 2 tablets (650 mg total) by mouth every 4 (four) hours as needed for mild pain, Disp: 30 tablet, Rfl: 0    ALPRAZolam (XANAX) 0 25 mg tablet, Take 0 25 mg by mouth daily at bedtime as needed, Disp: , Rfl:     atorvastatin (LIPITOR) 10 mg tablet, Take 10 mg by mouth daily, Disp: , Rfl:     lisinopril (ZESTRIL) 20 mg tablet, Take 20 mg by mouth daily, Disp: , Rfl:     docusate sodium (COLACE) 100 mg capsule, Take 1 capsule (100 mg total) by mouth 2 (two) times a day for 15 days, Disp: 30 capsule, Rfl: 0    gabapentin (NEURONTIN) 300 mg capsule, Take 1 capsule (300 mg total) by mouth 3 (three) times a day as needed (pain) (Patient not taking: Reported on 3/2/2021), Disp: 15 capsule, Rfl: 0    naproxen (NAPROSYN) 500 mg tablet, Take 500 mg by mouth 2 (two) times a day, Disp: , Rfl:     oxybutynin (DITROPAN) 5 mg tablet, Take 1 tablet (5 mg total) by mouth every 6 (six) hours as needed (bladder spasms) (Patient not taking: Reported on 3/2/2021), Disp: 30 tablet, Rfl: 0    phenazopyridine (PYRIDIUM) 100 mg tablet, Take 100 mg by mouth 3 (three) times a day as needed for bladder spasms, Disp: , Rfl:       Active Problems     Patient Active Problem List   Diagnosis    Prostatic disorder    HTN (hypertension)    BPH (benign prostatic hyperplasia)    Hyperlipidemia         Past Medical History     Past Medical History:   Diagnosis Date    Enlarged prostate     Hyperlipidemia     Hypertension     Neuropathy          Surgical History     Past Surgical History:   Procedure Laterality Date    KNEE ARTHROSCOPY Right     TN CYSTOSCOPY,DIR VIS INT URETHROTOMY N/A 10/23/2020    Procedure: DIRECT VISUAL INTERAL URETHROTOMY (DVIU); Surgeon: Shira Guardado MD;  Location: [facility];  Service: Urology    TN CYSTOURETHROSCOPY N/A 10/23/2020    Procedure: Merl Kins;  Surgeon: Shira Guardado MD;  [facility];  Service: Urology    TRANSURETHRAL RESECTION OF PROSTATE           Family History     Family History   Problem Relation Age of Onset    No Known Problems Mother          Social History     Social History     Social History     Tobacco Use   Smoking Status Never Smoker   Smokeless Tobacco Never Used         Pertinent Lab Values      Ref Range & Units 3/2/21 1051   POST-VOID RESIDUAL VOLUME, ML POC mL 8      POCT urine dip  Order: [id number]  Status:  Final result   Visible to patient:  No (inaccessible in [facility])   Next appt:  None   Dx: Benign prostatic hyperplasia, unspeci      Component 3/2/21 1050   LEUKOCYTE ESTERASE,UA -    NITRITE,UA -    SL AMB POCT UROBILINOGEN 0 2    POCT URINE PROTEIN -     PH,UA 7 0    BLOOD,UA -    SPECIFIC GRAVITY,UA 1 030    KETONES,UA -    BILIRUBIN,UA -    GLUCOSE, UA -     COLOR,UA yellow    CLARITY,UA clear                  Pertinent Imaging     US kidney and bladder  Narrative: RENAL ULTRASOUND    INDICATION:   N40 1: Benign prostatic hyperplasia with lower urinary tract symptoms  COMPARISON: None    TECHNIQUE:   Ultrasound of the retroperitoneum was performed with a curvilinear transducer utilizing volumetric sweeps and still imaging techniques  FINDINGS:    KIDNEYS:  Symmetric and normal size  Right kidney:  11 8 x 5 7 x 7 5 cm  Left kidney:  11 7 x 5 2 x 5 6 cm  Right kidney  Normal echogenicity and contour  No suspicious masses detected  No hydronephrosis  No shadowing calculi  No perinephric fluid collections  Left kidney  Normal echogenicity and contour  No suspicious masses detected  No hydronephrosis  No shadowing calculi  No perinephric fluid collections  URETERS:  Nonvisualized  BLADDER:   Normally distended  No focal thickening or mass lesions  Bilateral ureteral jets detected    Impression: No hydronephrosis,  Normal size kidneys  Bilateral ureteral jets are visualized     Workstation performed: [id number]

## 2021-03-09 ENCOUNTER — TELEPHONE (OUTPATIENT)
Dept: UROLOGY | Facility: AMBULATORY SURGERY CENTER | Age: 51
End: 2021-03-09

## 2021-03-09 NOTE — TELEPHONE ENCOUNTER
Received records request for patient  Left message on patient's wife's phone making her aware that his records were printed and sent out in the mail to them on 3-9-21  Our phone number was left in case if she had any questions

## 2021-03-26 ENCOUNTER — TELEPHONE (OUTPATIENT)
Dept: UROLOGY | Facility: MEDICAL CENTER | Age: 51
End: 2021-03-26

## 2021-03-26 NOTE — TELEPHONE ENCOUNTER
Called and spoke to Estela  Explained to patient's wife that Dr Rashaun Hernandez does not have any openings until August  Estela was hoping to get into the office next week with Dr Rashaun Hernandez  Explained to her that he does not have any openings  Advised her to follow up with [facility]

## 2021-03-26 NOTE — TELEPHONE ENCOUNTER
Pt managed by Stephania Lara, pt's wife called questioning if cystoscopy could be done by Remigio Alvarado next week as they received call from [facility] stating machine is broken and 3/29 procedure was canceled,I informed her I could not schedule at this time and would forward to physician's clinical

## 2021-05-20 ENCOUNTER — HOSPITAL ENCOUNTER (EMERGENCY)
Facility: HOSPITAL | Age: 51
Discharge: HOME | End: 2021-05-20
Attending: EMERGENCY MEDICINE | Admitting: EMERGENCY MEDICINE
Payer: COMMERCIAL

## 2021-05-20 ENCOUNTER — APPOINTMENT (EMERGENCY)
Dept: CT IMAGING | Facility: HOSPITAL | Age: 51
End: 2021-05-20
Payer: COMMERCIAL

## 2021-05-20 VITALS
DIASTOLIC BLOOD PRESSURE: 85 MMHG | SYSTOLIC BLOOD PRESSURE: 142 MMHG | TEMPERATURE: 98.7 F | RESPIRATION RATE: 15 BRPM | HEART RATE: 70 BPM | OXYGEN SATURATION: 99 %

## 2021-05-20 DIAGNOSIS — R51.9 ACUTE NONINTRACTABLE HEADACHE: Primary | ICD-10-CM

## 2021-05-20 LAB
ALBUMIN SERPL BCP-MCNC: 3.9 G/DL (ref 3.5–5)
ALP SERPL-CCNC: 100 U/L (ref 46–116)
ALT SERPL W P-5'-P-CCNC: 50 U/L (ref 12–78)
ANION GAP SERPL CALCULATED.3IONS-SCNC: 7 MMOL/L (ref 4–13)
AST SERPL W P-5'-P-CCNC: 22 U/L (ref 5–45)
ATRIAL RATE: 70 BPM
BASOPHILS # BLD AUTO: 0.07 THOUSANDS/ΜL (ref 0–0.1)
BASOPHILS NFR BLD AUTO: 1 % (ref 0–1)
BILIRUB SERPL-MCNC: 0.38 MG/DL (ref 0.2–1)
BUN SERPL-MCNC: 16 MG/DL (ref 5–25)
CALCIUM SERPL-MCNC: 8.7 MG/DL (ref 8.3–10.1)
CHLORIDE SERPL-SCNC: 104 MMOL/L (ref 100–108)
CO2 SERPL-SCNC: 31 MMOL/L (ref 21–32)
CREAT SERPL-MCNC: 0.89 MG/DL (ref 0.6–1.3)
EOSINOPHIL # BLD AUTO: 0.41 THOUSAND/ΜL (ref 0–0.61)
EOSINOPHIL NFR BLD AUTO: 6 % (ref 0–6)
ERYTHROCYTE [DISTWIDTH] IN BLOOD BY AUTOMATED COUNT: 13.4 % (ref 11.6–15.1)
GFR SERPL CREATININE-BSD FRML MDRD: 99 ML/MIN/1.73SQ M
GLUCOSE SERPL-MCNC: 97 MG/DL (ref 65–140)
HCT VFR BLD AUTO: 45.6 % (ref 36.5–49.3)
HGB BLD-MCNC: 15.2 G/DL (ref 12–17)
IMM GRANULOCYTES # BLD AUTO: 0.02 THOUSAND/UL (ref 0–0.2)
IMM GRANULOCYTES NFR BLD AUTO: 0 % (ref 0–2)
LYMPHOCYTES # BLD AUTO: 2.09 THOUSANDS/ΜL (ref 0.6–4.47)
LYMPHOCYTES NFR BLD AUTO: 30 % (ref 14–44)
MCH RBC QN AUTO: 27.7 PG (ref 26.8–34.3)
MCHC RBC AUTO-ENTMCNC: 33.3 G/DL (ref 31.4–37.4)
MCV RBC AUTO: 83 FL (ref 82–98)
MONOCYTES # BLD AUTO: 0.62 THOUSAND/ΜL (ref 0.17–1.22)
MONOCYTES NFR BLD AUTO: 9 % (ref 4–12)
NEUTROPHILS # BLD AUTO: 3.86 THOUSANDS/ΜL (ref 1.85–7.62)
NEUTS SEG NFR BLD AUTO: 54 % (ref 43–75)
NRBC BLD AUTO-RTO: 0 /100 WBCS
P AXIS: 27 DEGREES
PLATELET # BLD AUTO: 203 THOUSANDS/UL (ref 149–390)
PMV BLD AUTO: 10.8 FL (ref 8.9–12.7)
POTASSIUM SERPL-SCNC: 4.2 MMOL/L (ref 3.5–5.3)
PR INTERVAL: 196 MS
PROT SERPL-MCNC: 7.5 G/DL (ref 6.4–8.2)
QRS AXIS: -24 DEGREES
QRSD INTERVAL: 130 MS
QT INTERVAL: 410 MS
QTC INTERVAL: 442 MS
RBC # BLD AUTO: 5.48 MILLION/UL (ref 3.88–5.62)
SODIUM SERPL-SCNC: 142 MMOL/L (ref 136–145)
T WAVE AXIS: 26 DEGREES
TROPONIN I SERPL-MCNC: 0.03 NG/ML
VENTRICULAR RATE: 70 BPM
WBC # BLD AUTO: 7.07 THOUSAND/UL (ref 4.31–10.16)

## 2021-05-20 PROCEDURE — 96375 TX/PRO/DX INJ NEW DRUG ADDON: CPT

## 2021-05-20 PROCEDURE — 85025 COMPLETE CBC W/AUTO DIFF WBC: CPT | Performed by: PHYSICIAN ASSISTANT

## 2021-05-20 PROCEDURE — 93010 ELECTROCARDIOGRAM REPORT: CPT | Performed by: INTERNAL MEDICINE

## 2021-05-20 PROCEDURE — 99284 EMERGENCY DEPT VISIT MOD MDM: CPT

## 2021-05-20 PROCEDURE — 70498 CT ANGIOGRAPHY NECK: CPT

## 2021-05-20 PROCEDURE — 36415 COLL VENOUS BLD VENIPUNCTURE: CPT | Performed by: PHYSICIAN ASSISTANT

## 2021-05-20 PROCEDURE — 99285 EMERGENCY DEPT VISIT HI MDM: CPT | Performed by: PHYSICIAN ASSISTANT

## 2021-05-20 PROCEDURE — 93005 ELECTROCARDIOGRAM TRACING: CPT

## 2021-05-20 PROCEDURE — 80053 COMPREHEN METABOLIC PANEL: CPT | Performed by: PHYSICIAN ASSISTANT

## 2021-05-20 PROCEDURE — G1004 CDSM NDSC: HCPCS

## 2021-05-20 PROCEDURE — 70496 CT ANGIOGRAPHY HEAD: CPT

## 2021-05-20 PROCEDURE — 84484 ASSAY OF TROPONIN QUANT: CPT | Performed by: PHYSICIAN ASSISTANT

## 2021-05-20 PROCEDURE — 96365 THER/PROPH/DIAG IV INF INIT: CPT

## 2021-05-20 RX ORDER — MAGNESIUM SULFATE HEPTAHYDRATE 40 MG/ML
2 INJECTION, SOLUTION INTRAVENOUS ONCE
Status: COMPLETED | OUTPATIENT
Start: 2021-05-20 | End: 2021-05-20

## 2021-05-20 RX ORDER — BUTALBITAL, ACETAMINOPHEN AND CAFFEINE 50; 325; 40 MG/1; MG/1; MG/1
1 TABLET ORAL EVERY 4 HOURS PRN
Qty: 30 TABLET | Refills: 0 | Status: SHIPPED | OUTPATIENT
Start: 2021-05-20 | End: 2022-05-25

## 2021-05-20 RX ORDER — DEXAMETHASONE SODIUM PHOSPHATE 10 MG/ML
10 INJECTION, SOLUTION INTRAMUSCULAR; INTRAVENOUS ONCE
Status: COMPLETED | OUTPATIENT
Start: 2021-05-20 | End: 2021-05-20

## 2021-05-20 RX ORDER — DIPHENHYDRAMINE HYDROCHLORIDE 50 MG/ML
25 INJECTION INTRAMUSCULAR; INTRAVENOUS ONCE
Status: COMPLETED | OUTPATIENT
Start: 2021-05-20 | End: 2021-05-20

## 2021-05-20 RX ORDER — METOCLOPRAMIDE HYDROCHLORIDE 5 MG/ML
10 INJECTION INTRAMUSCULAR; INTRAVENOUS ONCE
Status: COMPLETED | OUTPATIENT
Start: 2021-05-20 | End: 2021-05-20

## 2021-05-20 RX ADMIN — METOCLOPRAMIDE 10 MG: 5 INJECTION, SOLUTION INTRAMUSCULAR; INTRAVENOUS at 15:25

## 2021-05-20 RX ADMIN — SODIUM CHLORIDE 1000 ML: 0.9 INJECTION, SOLUTION INTRAVENOUS at 15:26

## 2021-05-20 RX ADMIN — DEXAMETHASONE SODIUM PHOSPHATE 10 MG: 10 INJECTION, SOLUTION INTRAMUSCULAR; INTRAVENOUS at 15:25

## 2021-05-20 RX ADMIN — IOHEXOL 85 ML: 350 INJECTION, SOLUTION INTRAVENOUS at 16:05

## 2021-05-20 RX ADMIN — DIPHENHYDRAMINE HYDROCHLORIDE 25 MG: 50 INJECTION, SOLUTION INTRAMUSCULAR; INTRAVENOUS at 15:26

## 2021-05-20 RX ADMIN — MAGNESIUM SULFATE HEPTAHYDRATE 2 G: 40 INJECTION, SOLUTION INTRAVENOUS at 15:26

## 2021-05-20 NOTE — DISCHARGE INSTRUCTIONS
Take Fioricet as needed for headaches  Increase your fluid intake and start taking a magnesium supplement  Please follow-up with your family doctor and neurology  You should discuss with neurology whether a preventive migraine medication is right for you  Return to the ER with any new or worsening neurologic symptoms

## 2021-05-20 NOTE — ED NOTES
Patient transported to [address] [place] [address], [address] [place] [address]  05/20/21 2020

## 2021-05-20 NOTE — ED PROVIDER NOTES
History  Chief Complaint   Patient presents with    Headache     Pt's wife states the pt has been having left sided headache X1 week with hx of high BP      52yo male with a history of hypertension and hyperlipidemia presenting with his wife for evaluation of a left-sided headache x 1 week  Patient has a known history of migraines and states his headache feels similar although his migraines have been gradually worsening  Headache was gradual in onset and located occipitally  He has been using Tylenol without much relief  Patient also complains of intermittent lightheadedness and paresthesias in his bilateral arms  He has no current dizziness or paresthesias  Patient wife was recent diagnosed with a brain aneurysm and his wife is worried that the patient also is suffering from this  His wife states his blood pressures have been "very high" at home although they are unable to tell what the exact numbers were  Patient's wife apparently called his neurologist but was unable to make an appointment until next week so decided to come here for evaluation  Patient's wife is requesting a referral to a neurosurgeon  Patient had an MRA brain in August 2020 revealed "No definite aneurysm or arteriovenous malformation seen  Appearance of signal loss and narrowing of proximal bilateral posterior cerebral arteries and proximal left middle cerebral artery, suspected for artifact " He is otherwise asymptomatic and denies any fevers, chills, nausea, vomiting, chest pain, dysarthria, difficulty ambulating         History provided by:  Patient, spouse and medical records   used: Yes (Wife)    Headache  Pain location:  Occipital  Quality:  Dull  Radiates to:  Does not radiate  Severity currently:  10/10  Onset quality:  Gradual  Duration:  1 week  Timing:  Constant  Progression:  Unchanged  Chronicity:  New  Similar to prior headaches: yes    Relieved by:  Nothing  Worsened by:  Nothing  Ineffective treatments: Acetaminophen  Associated symptoms: dizziness and paresthesias    Associated symptoms: no abdominal pain, no back pain, no blurred vision, no congestion, no cough, no diarrhea, no eye pain, no fatigue, no fever, no focal weakness, no loss of balance, no myalgias, no near-syncope, no neck stiffness, no photophobia, no seizures, no sore throat, no syncope, no URI, no visual change, no vomiting and no weakness        Prior to Admission Medications   Prescriptions Last Dose Informant Patient Reported? Taking?    ALPRAZolam (XANAX) 0 25 mg tablet  Self Yes No   Sig: Take 0 25 mg by mouth daily at bedtime as needed   acetaminophen (TYLENOL) 325 mg tablet  Self No No   Sig: Take 2 tablets (650 mg total) by mouth every 4 (four) hours as needed for mild pain   atorvastatin (LIPITOR) 10 mg tablet   Yes No   Sig: Take 10 mg by mouth daily   docusate sodium (COLACE) 100 mg capsule  Self No No   Sig: Take 1 capsule (100 mg total) by mouth 2 (two) times a day for 15 days   gabapentin (NEURONTIN) 300 mg capsule  Self No No   Sig: Take 1 capsule (300 mg total) by mouth 3 (three) times a day as needed (pain)   Patient not taking: Reported on 3/2/2021   lisinopril (ZESTRIL) 20 mg tablet  Self Yes No   Sig: Take 20 mg by mouth daily   naproxen (NAPROSYN) 500 mg tablet  Self Yes No   Sig: Take 500 mg by mouth 2 (two) times a day   oxybutynin (DITROPAN) 5 mg tablet  Self No No   Sig: Take 1 tablet (5 mg total) by mouth every 6 (six) hours as needed (bladder spasms)   Patient not taking: Reported on 3/2/2021   phenazopyridine (PYRIDIUM) 100 mg tablet  Self Yes No   Sig: Take 100 mg by mouth 3 (three) times a day as needed for bladder spasms      Facility-Administered Medications: None       Past Medical History:   Diagnosis Date    Enlarged prostate     Hyperlipidemia     Hypertension     Neuropathy        Past Surgical History:   Procedure Laterality Date    KNEE ARTHROSCOPY Right     VA CYSTOSCOPY,DIR VIS INT URETHROTOMY N/A 10/23/2020    Procedure: DIRECT VISUAL INTERAL URETHROTOMY (DVIU); Surgeon: Chencho Ramírez MD;  Location: [facility];  Service: Urology    SC CYSTOURETHROSCOPY N/A 10/23/2020    Procedure: Lupe Estevez;  Surgeon: Chencho Ramírez MD;  Location: [facility];  Service: Urology    TRANSURETHRAL RESECTION OF PROSTATE         Family History   Problem Relation Age of Onset    No Known Problems Mother      I have reviewed and agree with the history as documented  E-Cigarette/Vaping    E-Cigarette Use Never User      E-Cigarette/Vaping Substances     Social History     Tobacco Use    Smoking status: Never Smoker    Smokeless tobacco: Never Used   Substance Use Topics    Alcohol use: No    Drug use: No       Review of Systems   Constitutional: Negative for fatigue and fever  HENT: Negative for congestion, drooling and sore throat  Eyes: Negative for blurred vision, photophobia and pain  Respiratory: Negative for cough and shortness of breath  Cardiovascular: Negative for chest pain, palpitations, syncope and near-syncope  Gastrointestinal: Negative for abdominal pain, diarrhea and vomiting  Musculoskeletal: Negative for back pain, myalgias and neck stiffness  Skin: Negative for color change and rash  Neurological: Positive for dizziness, light-headedness, headaches and paresthesias  Negative for tremors, focal weakness, seizures, syncope, speech difficulty, weakness and loss of balance  Psychiatric/Behavioral: Negative for confusion  The patient is nervous/anxious  All other systems reviewed and are negative  Physical Exam  Physical Exam  Vitals signs and nursing note reviewed  Constitutional:       General: He is not in acute distress  Appearance: He is well-developed  He is not diaphoretic  Comments: Appears comfortable, non-toxic   HENT:      Head: Normocephalic and atraumatic        Right Ear: External ear normal       Left Ear: External ear normal    Eyes: General: No scleral icterus  Right eye: No discharge  Left eye: No discharge  Extraocular Movements: Extraocular movements intact  Conjunctiva/sclera: Conjunctivae normal       Pupils: Pupils are equal, round, and reactive to light  Neck:      Musculoskeletal: Normal range of motion and neck supple  No neck rigidity  Comments: No meningismus  Cardiovascular:      Rate and Rhythm: Normal rate and regular rhythm  Heart sounds: Normal heart sounds  No murmur  Pulmonary:      Effort: Pulmonary effort is normal  No respiratory distress  Breath sounds: Normal breath sounds  No stridor  No wheezing or rales  Abdominal:      General: Bowel sounds are normal  There is no distension  Palpations: Abdomen is soft  Tenderness: There is no abdominal tenderness  There is no guarding  Musculoskeletal: Normal range of motion  General: No deformity  Skin:     General: Skin is warm and dry  Neurological:      General: No focal deficit present  Mental Status: He is alert  He is not disoriented  GCS: GCS eye subscore is 4  GCS verbal subscore is 5  GCS motor subscore is 6  Cranial Nerves: Cranial nerves are intact  No cranial nerve deficit, dysarthria or facial asymmetry  Sensory: Sensation is intact  Motor: Motor function is intact  No weakness or seizure activity  Coordination: Coordination is intact  Coordination normal  Finger-Nose-Finger Test and Heel to Karmen Lusher Test normal       Gait: Gait is intact  Gait and tandem walk normal       Comments: 5/5 strength and light sensation intact in all extremities  CN 2-12 intact  PERRL  EOMs intact  Negative Pronator drift  Normal finger to nose and heel to shin b/l  Speech clear  A&O  Normal tandem gait      Psychiatric:         Behavior: Behavior normal          Vital Signs  ED Triage Vitals   Temperature Pulse Respirations Blood Pressure SpO2   05/20/21 1234 05/20/21 1234 05/20/21 1234 05/20/21 1234 05/20/21 1234   98 7 °F (37 1 °C) 88 22 144/87 98 %      Temp Source Heart Rate Source Patient Position - Orthostatic VS BP Location FiO2 (%)   05/20/21 1234 05/20/21 1234 05/20/21 1234 05/20/21 1234 --   Temporal Monitor Sitting Left arm       Pain Score       05/20/21 1534       7           Vitals:    05/20/21 1234 05/20/21 1715   BP: 144/87 142/85   Pulse: 88 70   Patient Position - Orthostatic VS: Sitting Lying         Visual Acuity  Visual Acuity      Most Recent Value   L Pupil Size (mm)  4   R Pupil Size (mm)  4          ED Medications  Medications   diphenhydrAMINE (BENADRYL) injection 25 mg (25 mg Intravenous Given 5/20/21 1526)   metoclopramide (REGLAN) injection 10 mg (10 mg Intravenous Given 5/20/21 1525)   dexamethasone (PF) (DECADRON) injection 10 mg (10 mg Intravenous Given 5/20/21 1525)   magnesium sulfate 2 g/50 mL IVPB (premix) 2 g (0 g Intravenous Stopped 5/20/21 1640)   sodium chloride 0 9 % bolus 1,000 mL (0 mL Intravenous Stopped 5/20/21 1640)   iohexol (OMNIPAQUE) 350 MG/ML injection (SINGLE-DOSE) 85 mL (85 mL Intravenous Given 5/20/21 1605)       Diagnostic Studies  Results Reviewed     Procedure Component Value Units Date/Time    Comprehensive metabolic panel [[id number]] Collected: 05/20/21 1525    Lab Status: Final result Specimen: Blood from Arm, Left Updated: 05/20/21 1553     Sodium 142 mmol/L      Potassium 4 2 mmol/L      Chloride 104 mmol/L      CO2 31 mmol/L      ANION GAP 7 mmol/L      BUN 16 mg/dL      Creatinine 0 89 mg/dL      Glucose 97 mg/dL      Calcium 8 7 mg/dL      AST 22 U/L      ALT 50 U/L      Alkaline Phosphatase 100 U/L      Total Protein 7 5 g/dL      Albumin 3 9 g/dL      Total Bilirubin 0 38 mg/dL      eGFR 99 ml/min/1 73sq m     Narrative:      [place] guidelines for Chronic Kidney Disease (CKD):     Stage 1 with normal or high GFR (GFR > 90 mL/min/1 73 square meters)    Stage 2 Mild CKD (GFR = 60-89 mL/min/1 73 square meters)    Stage 3A Moderate CKD (GFR = 45-59 mL/min/1 73 square meters)    Stage 3B Moderate CKD (GFR = 30-44 mL/min/1 73 square meters)    Stage 4 Severe CKD (GFR = 15-29 mL/min/1 73 square meters)    Stage 5 End Stage CKD (GFR <15 mL/min/1 73 square meters)  Note: GFR calculation is accurate only with a steady state creatinine    Troponin I [[id number]]  (Normal) Collected: 05/20/21 1525    Lab Status: Final result Specimen: Blood from Arm, Left Updated: 05/20/21 1550     Troponin I 0 03 ng/mL     CBC and differential [[id number]] Collected: 05/20/21 1525    Lab Status: Final result Specimen: Blood from Arm, Left Updated: 05/20/21 1534     WBC 7 07 Thousand/uL      RBC 5 48 Million/uL      Hemoglobin 15 2 g/dL      Hematocrit 45 6 %      MCV 83 fL      MCH 27 7 pg      MCHC 33 3 g/dL      RDW 13 4 %      MPV 10 8 fL      Platelets 057 Thousands/uL      nRBC 0 /100 WBCs      Neutrophils Relative 54 %      Immat GRANS % 0 %      Lymphocytes Relative 30 %      Monocytes Relative 9 %      Eosinophils Relative 6 %      Basophils Relative 1 %      Neutrophils Absolute 3 86 Thousands/µL      Immature Grans Absolute 0 02 Thousand/uL      Lymphocytes Absolute 2 09 Thousands/µL      Monocytes Absolute 0 62 Thousand/µL      Eosinophils Absolute 0 41 Thousand/µL      Basophils Absolute 0 07 Thousands/µL                  CTA head and neck with and without contrast   Final Result by Toni San DO (05/20 1701)      Normal CT of the brain  Normal CTA of the neck and brain              Workstation performed: [id number]                    Procedures  ECG 12 Lead Documentation Only    Date/Time: 5/21/2021 12:27 AM  Performed by: Arelis Recinos PA-C  Authorized by: Arelis Recinos PA-C     Indications / Diagnosis:  Headache  ECG reviewed by me, the ED Provider: yes    Patient location:  ED  Interpretation:     Interpretation: normal    Rate:     ECG rate:  70    ECG rate assessment: normal    Rhythm: Rhythm: sinus rhythm    Ectopy:     Ectopy: none    QRS:     QRS axis:  Left  Conduction:     Conduction: normal    ST segments:     ST segments:  Normal  T waves:     T waves: normal               ED Course  ED Course as of May 21 0028   u May 20, 2021   1722 Patient re-evaluated  Headache has completely resolved after medications  MDM  Number of Diagnoses or Management Options  Acute nonintractable headache: new and requires workup  Diagnosis management comments: 52yo male with a history of hypertension and migraines presenting for evaluation of a headache x 1 week that is similar to his previous migraines although more severe than normal  Patient's wife was recently diagnosed with a brain aneurysm and now he is worried that he also has this  Patient is afebrile and hemodynamically stable  No focal neurologic deficits  Differential diagnosis includes but is not limited to: migraine, tension headache, anxiety, dehydration, hypertension, doubt intracranial bleed, doubt ACS    Initial ED plan: Check cardiac labs, EKG, and CTA head/neck  Will give migraine cocktail with Decadron and re-evaluate  Final assessment: Workup unremarkable  Blood counts, electrolytes, renal function are normal  Troponin normal and EKG reveals NSR without ischemic changes  CTA head/neck is normal  On re-evaluation, headache has completely resolved  No indication for admission at this time  Script provided for Fioricet  Advised close PCP and neurology follow-up  ED return precautions discussed  Patient expressed understanding and is agreeable to plan  Patient discharged in stable condition           Amount and/or Complexity of Data Reviewed  Clinical lab tests: ordered and reviewed  Tests in the radiology section of CPT®: reviewed and ordered  Tests in the medicine section of CPT®: ordered and reviewed  Obtain history from someone other than the patient: yes  Review and summarize past medical records: yes  Independent visualization of images, tracings, or specimens: yes    Risk of Complications, Morbidity, and/or Mortality  Presenting problems: moderate  Diagnostic procedures: moderate  Management options: moderate    Patient Progress  Patient progress: improved      Disposition  Final diagnoses:   Acute nonintractable headache     Time reflects when diagnosis was documented in both MDM as applicable and the Disposition within this note     Time User Action Codes Description Comment    5/20/2021  5:22  [facility] [place] [R51 [id number]] Acute nonintractable headache       ED Disposition     ED Disposition Condition Date/Time Comment    Discharge Stable Thu May 20, 2021  5:21 PM [address] discharge to home/self care              Follow-up Information     Follow up With Specialties Details Why Contact Info Additional Information    Christiano Shannon MD Family Medicine Schedule an appointment as soon as possible for a visit   [address] [place] [address]       Hellen Kimball MD Neurology Schedule an appointment as soon as possible for a visit   [address] Emergency Department Emergency Medicine  If symptoms worsen [address] Emergency Department, [address] [place], [place], [address]          Discharge Medication List as of 5/20/2021  5:25 PM      START taking these medications    Details   butalbital-acetaminophen-caffeine (FIORICET,ESGIC) -40 mg per tablet Take 1 tablet by mouth every 4 (four) hours as needed for headaches, Starting Thu 5/20/2021, Normal         CONTINUE these medications which have NOT CHANGED    Details   acetaminophen (TYLENOL) 325 mg tablet Take 2 tablets (650 mg total) by mouth every 4 (four) hours as needed for mild pain, Starting Fri 10/23/2020, No Print      ALPRAZolam (XANAX) 0 25 mg tablet Take 0 25 mg by mouth daily at bedtime as needed, Starting Wed 6/3/2020, Historical Med      atorvastatin (LIPITOR) 10 mg tablet Take 10 mg by mouth daily, Historical Med      docusate sodium (COLACE) 100 mg capsule Take 1 capsule (100 mg total) by mouth 2 (two) times a day for 15 days, Starting Fri 10/23/2020, Until Sat 11/7/2020, Normal      gabapentin (NEURONTIN) 300 mg capsule Take 1 capsule (300 mg total) by mouth 3 (three) times a day as needed (pain), Starting Fri 10/23/2020, Normal      lisinopril (ZESTRIL) 20 mg tablet Take 20 mg by mouth daily, Starting Mon 5/18/2020, Historical Med      naproxen (NAPROSYN) 500 mg tablet Take 500 mg by mouth 2 (two) times a day, Starting Mon 5/18/2020, Historical Med      oxybutynin (DITROPAN) 5 mg tablet Take 1 tablet (5 mg total) by mouth every 6 (six) hours as needed (bladder spasms), Starting Fri 10/23/2020, Normal      phenazopyridine (PYRIDIUM) 100 mg tablet Take 100 mg by mouth 3 (three) times a day as needed for bladder spasms, Historical Med           No discharge procedures on file      PDMP Review     None          ED Provider  Electronically Signed by           Kan Orlando PA-C  05/21/21 8994

## 2021-11-17 ENCOUNTER — APPOINTMENT (EMERGENCY)
Dept: RADIOLOGY | Facility: HOSPITAL | Age: 51
End: 2021-11-17
Payer: COMMERCIAL

## 2021-11-17 ENCOUNTER — HOSPITAL ENCOUNTER (EMERGENCY)
Facility: HOSPITAL | Age: 51
Discharge: HOME | End: 2021-11-17
Attending: EMERGENCY MEDICINE
Payer: COMMERCIAL

## 2021-11-17 VITALS
RESPIRATION RATE: 18 BRPM | SYSTOLIC BLOOD PRESSURE: 162 MMHG | HEART RATE: 63 BPM | OXYGEN SATURATION: 96 % | DIASTOLIC BLOOD PRESSURE: 97 MMHG

## 2021-11-17 DIAGNOSIS — S89.92XA KNEE INJURY, LEFT, INITIAL ENCOUNTER: Primary | ICD-10-CM

## 2021-11-17 PROCEDURE — 73564 X-RAY EXAM KNEE 4 OR MORE: CPT

## 2021-11-17 PROCEDURE — 96372 THER/PROPH/DIAG INJ SC/IM: CPT

## 2021-11-17 PROCEDURE — 99283 EMERGENCY DEPT VISIT LOW MDM: CPT

## 2021-11-17 PROCEDURE — 99284 EMERGENCY DEPT VISIT MOD MDM: CPT | Performed by: PHYSICIAN ASSISTANT

## 2021-11-17 RX ORDER — NAPROXEN 500 MG/1
500 TABLET ORAL 2 TIMES DAILY PRN
Qty: 10 TABLET | Refills: 0 | Status: SHIPPED | OUTPATIENT
Start: 2021-11-17 | End: 2022-05-25

## 2021-11-17 RX ORDER — KETOROLAC TROMETHAMINE 30 MG/ML
15 INJECTION, SOLUTION INTRAMUSCULAR; INTRAVENOUS ONCE
Status: COMPLETED | OUTPATIENT
Start: 2021-11-17 | End: 2021-11-17

## 2021-11-17 RX ADMIN — KETOROLAC TROMETHAMINE 15 MG: 30 INJECTION, SOLUTION INTRAMUSCULAR at 12:22

## 2021-11-19 ENCOUNTER — OFFICE VISIT (OUTPATIENT)
Dept: OBGYN CLINIC | Facility: CLINIC | Age: 51
End: 2021-11-19
Payer: COMMERCIAL

## 2021-11-19 VITALS
WEIGHT: 215.8 LBS | BODY MASS INDEX: 35.96 KG/M2 | DIASTOLIC BLOOD PRESSURE: 110 MMHG | HEIGHT: 65 IN | HEART RATE: 73 BPM | SYSTOLIC BLOOD PRESSURE: 172 MMHG

## 2021-11-19 DIAGNOSIS — M23.92 INTERNAL DERANGEMENT OF LEFT KNEE: Primary | ICD-10-CM

## 2021-11-19 PROCEDURE — 99204 OFFICE O/P NEW MOD 45 MIN: CPT | Performed by: ORTHOPAEDIC SURGERY

## 2021-11-19 RX ORDER — HYDROCHLOROTHIAZIDE 12.5 MG/1
12.5 CAPSULE, GELATIN COATED ORAL DAILY
COMMUNITY
Start: 2021-10-25

## 2021-11-19 RX ORDER — IBUPROFEN 800 MG/1
800 TABLET ORAL EVERY 6 HOURS PRN
COMMUNITY
Start: 2021-08-07 | End: 2022-05-25

## 2021-11-19 RX ORDER — LISINOPRIL 30 MG/1
30 TABLET ORAL DAILY
COMMUNITY
Start: 2021-10-22 | End: 2022-05-25

## 2021-11-19 RX ORDER — MELOXICAM 15 MG/1
15 TABLET ORAL DAILY
Qty: 30 TABLET | Refills: 0 | Status: SHIPPED | OUTPATIENT
Start: 2021-11-19 | End: 2022-05-25

## 2021-12-09 ENCOUNTER — HOSPITAL ENCOUNTER (OUTPATIENT)
Dept: MRI IMAGING | Facility: CLINIC | Age: 51
Discharge: HOME | End: 2021-12-09
Payer: COMMERCIAL

## 2021-12-09 DIAGNOSIS — M23.92 INTERNAL DERANGEMENT OF LEFT KNEE: ICD-10-CM

## 2021-12-09 PROCEDURE — G1004 CDSM NDSC: HCPCS

## 2021-12-09 PROCEDURE — 73721 MRI JNT OF LWR EXTRE W/O DYE: CPT

## 2021-12-22 ENCOUNTER — OFFICE VISIT (OUTPATIENT)
Dept: OBGYN CLINIC | Facility: CLINIC | Age: 51
End: 2021-12-22
Payer: COMMERCIAL

## 2021-12-22 VITALS
HEIGHT: 65 IN | BODY MASS INDEX: 34.99 KG/M2 | DIASTOLIC BLOOD PRESSURE: 99 MMHG | HEART RATE: 71 BPM | SYSTOLIC BLOOD PRESSURE: 173 MMHG | WEIGHT: 210 LBS

## 2021-12-22 DIAGNOSIS — S83.232D COMPLEX TEAR OF MEDIAL MENISCUS OF LEFT KNEE AS CURRENT INJURY, SUBSEQUENT ENCOUNTER: Primary | ICD-10-CM

## 2021-12-22 PROCEDURE — 99214 OFFICE O/P EST MOD 30 MIN: CPT | Performed by: ORTHOPAEDIC SURGERY

## 2021-12-22 PROCEDURE — 20610 DRAIN/INJ JOINT/BURSA W/O US: CPT | Performed by: ORTHOPAEDIC SURGERY

## 2021-12-22 RX ADMIN — LIDOCAINE HYDROCHLORIDE 2 ML: 10 INJECTION, SOLUTION INFILTRATION; PERINEURAL at 14:26

## 2021-12-22 RX ADMIN — BETAMETHASONE SODIUM PHOSPHATE AND BETAMETHASONE ACETATE 6 MG: 3; 3 INJECTION, SUSPENSION INTRA-ARTICULAR; INTRALESIONAL; INTRAMUSCULAR; SOFT TISSUE at 14:26

## 2021-12-22 RX ADMIN — BUPIVACAINE HYDROCHLORIDE 2 ML: 2.5 INJECTION, SOLUTION INFILTRATION; PERINEURAL at 14:26

## 2021-12-22 NOTE — PROGRESS NOTES
HPI:  Patient is a 46y o  year old  male  who presents with chief complaint left knee pain that began  About 2 days prior to being seen the emergency department 11/17/2021  Patient states that he had been working in a crawl space where in he felt a pop in the medial aspect of his left knee and had immediate pain  Patient was able to ambulate after injury although it was painful  X-rays were performed in the emergency department and he was provided with  Naproxen for pain  Patient states that his left medial knee pain is worse  MRI of the left knee was ordered to evaluate for meniscal injury  Patient states that he continues to have pain over the medial aspect of the knee  He has note clicking and locking occasionally  ROS:   General: No fever, no chills, no weight loss, no weight gain  HEENT:  No loss of hearing, no nose bleeds, no sore throat  Eyes:  No eye pain, no red eyes, no visual disturbance  Respiratory:  No cough, no shortness of breath, no wheezing  Cardiovascular:  No chest pain, no palpitations, no edema  GI: No abdominal pain, no nausea, no vomiting  Endocrine: No frequent urination, no excessive thirst  Urinary:  No dysuria, no hematuria, no incontinence  Musculoskeletal: see HPI and PE  Skin:  No rash, no wounds  Neurological:  No dizziness, no headache, no numbness  Psychiatric:  No difficulty concentrating, no depression, no suicide thoughts, no anxiety  Review of all other systems is negative    PMH:  Past Medical History:   Diagnosis Date    Enlarged prostate     Hyperlipidemia     Hypertension     Neuropathy        PSH:  Past Surgical History:   Procedure Laterality Date    KNEE ARTHROSCOPY Right     MA CYSTOSCOPY,DIR VIS INT URETHROTOMY N/A 10/23/2020    Procedure: DIRECT VISUAL INTERAL URETHROTOMY (DVIU);   Surgeon: Gladis Dasilva MD;  [facility];  Service: Urology    MA CYSTOURETHROSCOPY N/A 10/23/2020    Procedure: CYSTOSCOPY;  Surgeon: Renata Dunaway Iker Kohli MD;  [facility];  Service: Urology    TRANSURETHRAL RESECTION OF PROSTATE         Medications:  Current Outpatient Medications   Medication Sig Dispense Refill    acetaminophen (TYLENOL) 325 mg tablet Take 2 tablets (650 mg total) by mouth every 4 (four) hours as needed for mild pain (Patient not taking: Reported on 11/19/2021 ) 30 tablet 0    ALPRAZolam (XANAX) 0 25 mg tablet Take 0 25 mg by mouth daily at bedtime as needed      atorvastatin (LIPITOR) 10 mg tablet Take 10 mg by mouth daily      butalbital-acetaminophen-caffeine (FIORICET,ESGIC) -40 mg per tablet Take 1 tablet by mouth every 4 (four) hours as needed for headaches (Patient not taking: Reported on 11/19/2021 ) 30 tablet 0    gabapentin (NEURONTIN) 300 mg capsule Take 1 capsule (300 mg total) by mouth 3 (three) times a day as needed (pain) (Patient not taking: Reported on 11/19/2021 ) 15 capsule 0    hydrochlorothiazide (MICROZIDE) 12 5 mg capsule Take 12 5 mg by mouth daily      ibuprofen (MOTRIN) 800 mg tablet Take 800 mg by mouth every 6 (six) hours as needed (Patient not taking: Reported on 11/19/2021 )      lisinopril (ZESTRIL) 20 mg tablet Take 20 mg by mouth daily      lisinopril (ZESTRIL) 30 mg tablet Take 30 mg by mouth daily (Patient not taking: Reported on 11/19/2021 )      meloxicam (Mobic) 15 mg tablet Take 1 tablet (15 mg total) by mouth daily 30 tablet 0    naproxen (EC NAPROSYN) 500 MG EC tablet Take 1 tablet (500 mg total) by mouth 2 (two) times a day as needed for mild pain 10 tablet 0    oxybutynin (DITROPAN) 5 mg tablet Take 1 tablet (5 mg total) by mouth every 6 (six) hours as needed (bladder spasms) (Patient not taking: Reported on 3/2/2021) 30 tablet 0    phenazopyridine (PYRIDIUM) 100 mg tablet Take 100 mg by mouth 3 (three) times a day as needed for bladder spasms (Patient not taking: Reported on 11/19/2021 )       No current facility-administered medications for this visit  Allergies:  No Known Allergies    Family History:  Family History   Problem Relation Age of Onset    No Known Problems Mother        Social History:  Social History     Occupational History    Not on file   Tobacco Use    Smoking status: Never Smoker    Smokeless tobacco: Never Used   Vaping Use    Vaping Use: Never used   Substance and Sexual Activity    Alcohol use: No    Drug use: No    Sexual activity: Not on file       Physical Exam:  General :  Alert, cooperative, no distress, appears stated age  Blood pressure (!) 173/99, pulse 71, height 5' 5" (1 651 m), weight 95 3 kg (210 lb)  Head:  Normocephalic, without obvious abnormality, atraumatic   Eyes:  Conjunctiva/corneas clear, EOM's intact,   Ears: Both ears normal appearance, no hearing deficits  Nose: Nares normal, septum midline, no drainage    Neck: Supple,  trachea midline, no adenopathy, no tenderness, no mass   Back:   Symmetric, no curvature, ROM normal, no tenderness   Lungs:   Respirations unlabored   Chest Wall:  No tenderness or deformity   Extremities: Extremities normal, atraumatic, no cyanosis or edema      Pulses: 2+ and symmetric   Skin: Skin color, texture, turgor normal, no rashes or lesions      Neurologic: Normal           Left Knee Exam     Tenderness   The patient is experiencing tenderness in the medial joint line (minimal anterior lateral tenderness, no tenderness over adductor tubricle or MCL )  Range of Motion   The patient has normal left knee ROM  Tests   Nickie:  Medial - negative Lateral - negative  Varus: negative Valgus: negative  Lachman:  Anterior - negative    Posterior - negative    Other   Erythema: absent  Swelling: mild    Comments:  Pain with knee flexion   Small effusion   positive apply grind            Imaging Studies: The following imaging studies were reviewed in office today  My findings are noted       x-rays of the left knee performed 11/17/2021 show mild degenerative changes with narrowing of the medial compartment, small osteophyte formation, no acute fracture or dislocation    Assessment  Encounter Diagnosis   Name Primary?  Complex tear of medial meniscus of left knee as current injury, subsequent encounter Yes         Plan:      55-year-old male with left knee internal derangement, likely Medial meniscus tear   · MRI was reviewed with the patient at length  · Conservative and operative treatments were discussed with the patient at length  · Conservative treatment with a cortisone injection as well as taking Tylenol and anti-inflammatories were discussed  He would like to try the cortisone injection at this point as he would not be able to take off of work for least 6 weeks if there were to be repair with arthroscopic surgery  He tolerated procedure well  · He will follow up in 3 weeks for re-evaluation possibly discuss arthroscopic surgery with medial meniscus repair    Large joint arthrocentesis: L knee  Universal Protocol:  Consent: Verbal consent obtained  Risks and benefits: risks, benefits and alternatives were discussed  Consent given by: patient  Time out: Immediately prior to procedure a "time out" was called to verify the correct patient, procedure, equipment, support staff and site/side marked as required    Patient understanding: patient states understanding of the procedure being performed  Patient consent: the patient's understanding of the procedure matches consent given  Site marked: the operative site was marked  Patient identity confirmed: verbally with patient    Supporting Documentation  Indications: pain and joint swelling   Procedure Details  Location: knee - L knee  Preparation: Patient was prepped and draped in the usual sterile fashion  Needle size: 22 G  Ultrasound guidance: no  Approach: anteromedial  Medications administered: 2 mL bupivacaine 0 25 %; 2 mL lidocaine 1 %; 6 mg betamethasone acetate-betamethasone sodium phosphate 6 (3-3) mg/mL    Patient tolerance: patient tolerated the procedure well with no immediate complications  Dressing:  Sterile dressing applied          Scribe Attestation    I,:  Rae Sotelo PA-C am acting as a scribe while in the presence of the attending physician :       I,:  Harsha Navarrete MD personally performed the services described in this documentation    as scribed in my presence :

## 2022-01-21 RX ORDER — LIDOCAINE HYDROCHLORIDE 10 MG/ML
2 INJECTION, SOLUTION INFILTRATION; PERINEURAL
Status: COMPLETED | OUTPATIENT
Start: 2021-12-22 | End: 2021-12-22

## 2022-01-21 RX ORDER — BETAMETHASONE SODIUM PHOSPHATE AND BETAMETHASONE ACETATE 3; 3 MG/ML; MG/ML
6 INJECTION, SUSPENSION INTRA-ARTICULAR; INTRALESIONAL; INTRAMUSCULAR; SOFT TISSUE
Status: COMPLETED | OUTPATIENT
Start: 2021-12-22 | End: 2021-12-22

## 2022-01-21 RX ORDER — BUPIVACAINE HYDROCHLORIDE 2.5 MG/ML
2 INJECTION, SOLUTION INFILTRATION; PERINEURAL
Status: COMPLETED | OUTPATIENT
Start: 2021-12-22 | End: 2021-12-22

## 2022-02-18 ENCOUNTER — TELEPHONE (OUTPATIENT)
Dept: OBGYN CLINIC | Facility: HOSPITAL | Age: 52
End: 2022-02-18

## 2022-02-18 ENCOUNTER — OFFICE VISIT (OUTPATIENT)
Dept: OBGYN CLINIC | Facility: CLINIC | Age: 52
End: 2022-02-18
Payer: COMMERCIAL

## 2022-02-18 VITALS
SYSTOLIC BLOOD PRESSURE: 178 MMHG | WEIGHT: 215.6 LBS | BODY MASS INDEX: 35.92 KG/M2 | HEART RATE: 89 BPM | HEIGHT: 65 IN | DIASTOLIC BLOOD PRESSURE: 104 MMHG

## 2022-02-18 DIAGNOSIS — S83.232D COMPLEX TEAR OF MEDIAL MENISCUS OF LEFT KNEE AS CURRENT INJURY, SUBSEQUENT ENCOUNTER: Primary | ICD-10-CM

## 2022-02-18 DIAGNOSIS — Z01.818 PREOP EXAMINATION: Primary | ICD-10-CM

## 2022-02-18 DIAGNOSIS — M17.12 PRIMARY OSTEOARTHRITIS OF LEFT KNEE: ICD-10-CM

## 2022-02-18 PROCEDURE — 99214 OFFICE O/P EST MOD 30 MIN: CPT | Performed by: ORTHOPAEDIC SURGERY

## 2022-02-18 RX ORDER — CHLORHEXIDINE GLUCONATE 4 G/100ML
SOLUTION TOPICAL DAILY PRN
Status: CANCELLED | OUTPATIENT
Start: 2022-02-18

## 2022-02-18 RX ORDER — CHLORHEXIDINE GLUCONATE 0.12 MG/ML
15 RINSE ORAL ONCE
Status: CANCELLED | OUTPATIENT
Start: 2022-02-18 | End: 2022-02-18

## 2022-02-18 NOTE — TELEPHONE ENCOUNTER
Left message for pt to call me back to discuss no sooner dates for surgery  Left my contact info    Bhupinder Kim

## 2022-02-18 NOTE — TELEPHONE ENCOUNTER
Patient's wife asking if they could have an earlier surgery date for her  L-knee  She states they gave him an April date

## 2022-02-18 NOTE — PROGRESS NOTES
HPI:  Patient is a 46 y o   male who presents for follow-up of left knee pain  Patient was injured on 11/15/2021 when he was working in a crawl space and felt a pop in the medial aspect of left knee  Patient was last seen in the office on 12/22/2021 where MRI was reviewed with patient showing a medial meniscus tear  Patient was given a steroid injection at that time  Patient states pain in left knee has been constant since last visit  Patient did not get any relief from steroid injection  Pain is on the medial aspect of the left knee  Pain is worse with ambulation and at night when trying to sleep  Patient takes ibuprofen as needed for pain  Patient had episode of locking sensation yesterday in the left knee   was utilized during the entirety of appointment  ROS:   General: No fever, no chills, no weight loss, no weight gain  HEENT:  No loss of hearing, no nose bleeds, no sore throat  Eyes:  No eye pain, no red eyes, no visual disturbance  Respiratory:  No cough, no shortness of breath, no wheezing  Cardiovascular:  No chest pain, no palpitations, no edema  GI: No abdominal pain, no nausea, no vomiting  Endocrine: No frequent urination, no excessive thirst  Urinary:  No dysuria, no hematuria, no incontinence  Musculoskeletal: see HPI and PE  Skin:  No rash, no wounds  Neurological:  No dizziness, no headache, no numbness  Psychiatric:  No difficulty concentrating, no depression, no suicide thoughts, no anxiety  Review of all other systems is negative    PMH:  Past Medical History:   Diagnosis Date    Enlarged prostate     Hyperlipidemia     Hypertension     Neuropathy        PSH:  Past Surgical History:   Procedure Laterality Date    KNEE ARTHROSCOPY Right     CA CYSTOSCOPY,DIR VIS INT URETHROTOMY N/A 10/23/2020    Procedure: DIRECT VISUAL INTERAL URETHROTOMY (DVIU);   Surgeon: Bertha Sánchez MD;  [facility];  Service: Urology    CA CYSTOURETHROSCOPY N/A 10/23/2020    Procedure: CYSTOSCOPY;  Surgeon: Shemar Phelan MD;  [facility];  Service: Urology    TRANSURETHRAL RESECTION OF PROSTATE         Medications:  Current Outpatient Medications   Medication Sig Dispense Refill    hydrochlorothiazide (MICROZIDE) 12 5 mg capsule Take 12 5 mg by mouth daily      lisinopril (ZESTRIL) 20 mg tablet Take 20 mg by mouth daily      acetaminophen (TYLENOL) 325 mg tablet Take 2 tablets (650 mg total) by mouth every 4 (four) hours as needed for mild pain (Patient not taking: Reported on 11/19/2021 ) 30 tablet 0    ALPRAZolam (XANAX) 0 25 mg tablet Take 0 25 mg by mouth daily at bedtime as needed (Patient not taking: Reported on 2/18/2022 )      atorvastatin (LIPITOR) 10 mg tablet Take 10 mg by mouth daily (Patient not taking: Reported on 2/18/2022 )      butalbital-acetaminophen-caffeine (FIORICET,ESGIC) -40 mg per tablet Take 1 tablet by mouth every 4 (four) hours as needed for headaches (Patient not taking: Reported on 11/19/2021 ) 30 tablet 0    gabapentin (NEURONTIN) 300 mg capsule Take 1 capsule (300 mg total) by mouth 3 (three) times a day as needed (pain) (Patient not taking: Reported on 11/19/2021 ) 15 capsule 0    ibuprofen (MOTRIN) 800 mg tablet Take 800 mg by mouth every 6 (six) hours as needed (Patient not taking: Reported on 11/19/2021 )      lisinopril (ZESTRIL) 30 mg tablet Take 30 mg by mouth daily (Patient not taking: Reported on 11/19/2021 )      meloxicam (Mobic) 15 mg tablet Take 1 tablet (15 mg total) by mouth daily (Patient not taking: Reported on 2/18/2022 ) 30 tablet 0    naproxen (EC NAPROSYN) 500 MG EC tablet Take 1 tablet (500 mg total) by mouth 2 (two) times a day as needed for mild pain (Patient not taking: Reported on 2/18/2022 ) 10 tablet 0    oxybutynin (DITROPAN) 5 mg tablet Take 1 tablet (5 mg total) by mouth every 6 (six) hours as needed (bladder spasms) (Patient not taking: Reported on 3/2/2021) 30 tablet 0    phenazopyridine (PYRIDIUM) 100 mg tablet Take 100 mg by mouth 3 (three) times a day as needed for bladder spasms (Patient not taking: Reported on 11/19/2021 )       No current facility-administered medications for this visit  Allergies:  No Known Allergies    Family History:  Family History   Problem Relation Age of Onset    No Known Problems Mother        Social History:  Social History     Occupational History    Not on file   Tobacco Use    Smoking status: Never Smoker    Smokeless tobacco: Never Used   Vaping Use    Vaping Use: Never used   Substance and Sexual Activity    Alcohol use: No    Drug use: No    Sexual activity: Not on file       Physical Exam:  General :  Alert, cooperative, no distress, appears stated age  Blood pressure (!) 178/104, pulse 89, height 5' 5" (1 651 m), weight 97 8 kg (215 lb 9 6 oz)  Head:  Normocephalic, without obvious abnormality, atraumatic   Eyes:  Conjunctiva/corneas clear, EOM's intact,   Ears: Both ears normal appearance, no hearing deficits  Nose: Nares normal, septum midline, no drainage    Neck: Supple,  trachea midline, no adenopathy, no tenderness, no mass   Back:   Symmetric, no curvature, ROM normal, no tenderness   Lungs:   Respirations unlabored  Clear to auscultation bilaterally  Chest Wall:  No tenderness or deformity   Extremities: Extremities normal, atraumatic, no cyanosis or edema      Pulses: 2+ and symmetric   Skin: Skin color, texture, turgor normal, no rashes or lesions      Neurologic: Normal   Heart:  Regular rate and rhythm       Left Knee Exam     Muscle Strength   The patient has normal left knee strength  Tenderness   The patient is experiencing tenderness in the lateral joint line and medial joint line  Range of Motion   The patient has normal left knee ROM      Tests   Varus: negative Valgus: negative  Drawer:  Anterior - negative     Posterior - negative    Other   Erythema: absent  Scars: absent  Sensation: normal  Pulse: present  Swelling: mild            Imaging Studies: The following imaging studies were reviewed in office today  My findings are noted  x-rays of the left knee performed 11/17/2021 shows mild degenerative changes with narrowing of the medial compartment, small osteophyte formation, and no acute fracture or dislocation  MRI left knee 12/9/21 mild medial compartment and PF OA  Large complex tear of MM body and posterior horn  Assessment  Encounter Diagnoses   Name Primary?  Complex tear of medial meniscus of left knee as current injury, subsequent encounter Yes    Primary osteoarthritis of left knee          Plan:  * Patient is a 59-year-old male with medial meniscus tear  * Discussed surgical intervention in detail including possible medial meniscus tear arthroscopic repair or partial medial menisectomy in detail  Patient wishes to proceed with surgical intervention  *Risks and benefits were discussed in detail including but not limited to re-tearing meniscus, infection, blood loss, post-traumatic arthritis, damage to surrounding structures such as ligaments, tendons, blood vessels, nerves, and muscle  Postoperative course discussed including need for limited weight bearing for 4-6 weeks if repair performed  *Surgical consent was obtained       Scribe Attestation    I,:  Danella Goltz, PA-C am acting as a scribe while in the presence of the attending physician :       I,:  Patricia Sotelo MD personally performed the services described in this documentation    as scribed in my presence :

## 2022-03-20 ENCOUNTER — APPOINTMENT (EMERGENCY)
Dept: ULTRASOUND IMAGING | Facility: HOSPITAL | Age: 52
End: 2022-03-20
Payer: COMMERCIAL

## 2022-03-20 ENCOUNTER — APPOINTMENT (EMERGENCY)
Dept: CT IMAGING | Facility: HOSPITAL | Age: 52
End: 2022-03-20
Payer: COMMERCIAL

## 2022-03-20 ENCOUNTER — HOSPITAL ENCOUNTER (EMERGENCY)
Facility: HOSPITAL | Age: 52
Discharge: HOME | End: 2022-03-20
Attending: EMERGENCY MEDICINE
Payer: COMMERCIAL

## 2022-03-20 VITALS
DIASTOLIC BLOOD PRESSURE: 84 MMHG | RESPIRATION RATE: 18 BRPM | HEART RATE: 101 BPM | SYSTOLIC BLOOD PRESSURE: 171 MMHG | WEIGHT: 219.58 LBS | BODY MASS INDEX: 36.54 KG/M2 | TEMPERATURE: 99.5 F | OXYGEN SATURATION: 96 %

## 2022-03-20 DIAGNOSIS — N39.0 UTI (URINARY TRACT INFECTION): Primary | ICD-10-CM

## 2022-03-20 LAB
ALBUMIN SERPL BCP-MCNC: 4.1 G/DL (ref 3.5–5)
ALP SERPL-CCNC: 91 U/L (ref 46–116)
ALT SERPL W P-5'-P-CCNC: 48 U/L (ref 12–78)
ANION GAP SERPL CALCULATED.3IONS-SCNC: 9 MMOL/L (ref 4–13)
AST SERPL W P-5'-P-CCNC: 19 U/L (ref 5–45)
BACTERIA UR QL AUTO: ABNORMAL /HPF
BASOPHILS # BLD AUTO: 0.06 THOUSANDS/ΜL (ref 0–0.1)
BASOPHILS NFR BLD AUTO: 0 % (ref 0–1)
BILIRUB SERPL-MCNC: 0.64 MG/DL (ref 0.2–1)
BILIRUB UR QL STRIP: NEGATIVE
BUN SERPL-MCNC: 12 MG/DL (ref 5–25)
CALCIUM SERPL-MCNC: 8.5 MG/DL (ref 8.3–10.1)
CHLORIDE SERPL-SCNC: 102 MMOL/L (ref 100–108)
CLARITY UR: CLEAR
CO2 SERPL-SCNC: 28 MMOL/L (ref 21–32)
COLOR UR: YELLOW
CREAT SERPL-MCNC: 0.85 MG/DL (ref 0.6–1.3)
EOSINOPHIL # BLD AUTO: 0.01 THOUSAND/ΜL (ref 0–0.61)
EOSINOPHIL NFR BLD AUTO: 0 % (ref 0–6)
ERYTHROCYTE [DISTWIDTH] IN BLOOD BY AUTOMATED COUNT: 13 % (ref 11.6–15.1)
GFR SERPL CREATININE-BSD FRML MDRD: 100 ML/MIN/1.73SQ M
GLUCOSE SERPL-MCNC: 109 MG/DL (ref 65–140)
GLUCOSE UR STRIP-MCNC: NEGATIVE MG/DL
HCT VFR BLD AUTO: 44.4 % (ref 36.5–49.3)
HGB BLD-MCNC: 15.2 G/DL (ref 12–17)
HGB UR QL STRIP.AUTO: ABNORMAL
IMM GRANULOCYTES # BLD AUTO: 0.08 THOUSAND/UL (ref 0–0.2)
IMM GRANULOCYTES NFR BLD AUTO: 0 % (ref 0–2)
KETONES UR STRIP-MCNC: NEGATIVE MG/DL
LEUKOCYTE ESTERASE UR QL STRIP: ABNORMAL
LIPASE SERPL-CCNC: 82 U/L (ref 73–393)
LYMPHOCYTES # BLD AUTO: 1.23 THOUSANDS/ΜL (ref 0.6–4.47)
LYMPHOCYTES NFR BLD AUTO: 7 % (ref 14–44)
MCH RBC QN AUTO: 29.9 PG (ref 26.8–34.3)
MCHC RBC AUTO-ENTMCNC: 34.2 G/DL (ref 31.4–37.4)
MCV RBC AUTO: 87 FL (ref 82–98)
MONOCYTES # BLD AUTO: 1.12 THOUSAND/ΜL (ref 0.17–1.22)
MONOCYTES NFR BLD AUTO: 6 % (ref 4–12)
NEUTROPHILS # BLD AUTO: 15.51 THOUSANDS/ΜL (ref 1.85–7.62)
NEUTS SEG NFR BLD AUTO: 87 % (ref 43–75)
NITRITE UR QL STRIP: NEGATIVE
NON-SQ EPI CELLS URNS QL MICRO: ABNORMAL /HPF
NRBC BLD AUTO-RTO: 0 /100 WBCS
PH UR STRIP.AUTO: 6.5 [PH]
PLATELET # BLD AUTO: 188 THOUSANDS/UL (ref 149–390)
PMV BLD AUTO: 11.4 FL (ref 8.9–12.7)
POTASSIUM SERPL-SCNC: 3.6 MMOL/L (ref 3.5–5.3)
PROT SERPL-MCNC: 7.5 G/DL (ref 6.4–8.2)
PROT UR STRIP-MCNC: NEGATIVE MG/DL
RBC # BLD AUTO: 5.08 MILLION/UL (ref 3.88–5.62)
RBC #/AREA URNS AUTO: ABNORMAL /HPF
SODIUM SERPL-SCNC: 139 MMOL/L (ref 136–145)
SP GR UR STRIP.AUTO: <=1.005 (ref 1–1.03)
UROBILINOGEN UR QL STRIP.AUTO: 0.2 E.U./DL
WBC # BLD AUTO: 18.01 THOUSAND/UL (ref 4.31–10.16)
WBC #/AREA URNS AUTO: ABNORMAL /HPF

## 2022-03-20 PROCEDURE — 87591 N.GONORRHOEAE DNA AMP PROB: CPT | Performed by: EMERGENCY MEDICINE

## 2022-03-20 PROCEDURE — 80053 COMPREHEN METABOLIC PANEL: CPT | Performed by: EMERGENCY MEDICINE

## 2022-03-20 PROCEDURE — 96374 THER/PROPH/DIAG INJ IV PUSH: CPT

## 2022-03-20 PROCEDURE — 76870 US EXAM SCROTUM: CPT

## 2022-03-20 PROCEDURE — 96361 HYDRATE IV INFUSION ADD-ON: CPT

## 2022-03-20 PROCEDURE — 87491 CHLMYD TRACH DNA AMP PROBE: CPT | Performed by: EMERGENCY MEDICINE

## 2022-03-20 PROCEDURE — 83690 ASSAY OF LIPASE: CPT | Performed by: EMERGENCY MEDICINE

## 2022-03-20 PROCEDURE — 81001 URINALYSIS AUTO W/SCOPE: CPT | Performed by: EMERGENCY MEDICINE

## 2022-03-20 PROCEDURE — 74177 CT ABD & PELVIS W/CONTRAST: CPT

## 2022-03-20 PROCEDURE — 96375 TX/PRO/DX INJ NEW DRUG ADDON: CPT

## 2022-03-20 PROCEDURE — 99285 EMERGENCY DEPT VISIT HI MDM: CPT | Performed by: EMERGENCY MEDICINE

## 2022-03-20 PROCEDURE — 99284 EMERGENCY DEPT VISIT MOD MDM: CPT

## 2022-03-20 PROCEDURE — 36415 COLL VENOUS BLD VENIPUNCTURE: CPT | Performed by: EMERGENCY MEDICINE

## 2022-03-20 PROCEDURE — 85025 COMPLETE CBC W/AUTO DIFF WBC: CPT | Performed by: EMERGENCY MEDICINE

## 2022-03-20 RX ORDER — CEPHALEXIN 500 MG/1
500 CAPSULE ORAL EVERY 6 HOURS SCHEDULED
Qty: 28 CAPSULE | Refills: 0 | Status: SHIPPED | OUTPATIENT
Start: 2022-03-20 | End: 2022-03-27

## 2022-03-20 RX ORDER — KETOROLAC TROMETHAMINE 30 MG/ML
15 INJECTION, SOLUTION INTRAMUSCULAR; INTRAVENOUS ONCE
Status: COMPLETED | OUTPATIENT
Start: 2022-03-20 | End: 2022-03-20

## 2022-03-20 RX ORDER — MORPHINE SULFATE 4 MG/ML
4 INJECTION, SOLUTION INTRAMUSCULAR; INTRAVENOUS ONCE
Status: COMPLETED | OUTPATIENT
Start: 2022-03-20 | End: 2022-03-20

## 2022-03-20 RX ORDER — CEPHALEXIN 250 MG/1
500 CAPSULE ORAL ONCE
Status: COMPLETED | OUTPATIENT
Start: 2022-03-20 | End: 2022-03-20

## 2022-03-20 RX ADMIN — KETOROLAC TROMETHAMINE 15 MG: 30 INJECTION, SOLUTION INTRAMUSCULAR at 12:43

## 2022-03-20 RX ADMIN — MORPHINE SULFATE 4 MG: 4 INJECTION INTRAVENOUS at 14:36

## 2022-03-20 RX ADMIN — IOHEXOL 100 ML: 350 INJECTION, SOLUTION INTRAVENOUS at 13:50

## 2022-03-20 RX ADMIN — SODIUM CHLORIDE 1000 ML: 0.9 INJECTION, SOLUTION INTRAVENOUS at 12:44

## 2022-03-20 RX ADMIN — CEPHALEXIN 500 MG: 250 CAPSULE ORAL at 17:07

## 2022-03-21 LAB
C TRACH DNA SPEC QL NAA+PROBE: NEGATIVE
N GONORRHOEA DNA SPEC QL NAA+PROBE: NEGATIVE

## 2022-03-22 NOTE — ED PROVIDER NOTES
History  Chief Complaint   Patient presents with    Difficulty Urinating     difficulty urinating, fever and body pain that started yesterday      47 y/o male presents to the ED with his wife for urinary symptoms since yesterday  paitent states that he developed dysuria, subjective fever, chills, and body aches  States that he took ibuprofen lastnight without relief  Denies any cp, sob, cough, abd pain, or d/c  States that he has bilateral testicular pain but denies swelling  States that he had a urethroplasty for penile stricture a few years ago  No other complaints  History provided by:  Patient  Difficulty Urinating  Presenting symptoms: dysuria    Context: spontaneously    Relieved by:  None tried  Worsened by:  Nothing  Ineffective treatments:  None tried  Associated symptoms: no abdominal pain, no diarrhea, no fever, no flank pain, no hematuria, no nausea, no urinary frequency and no vomiting        Prior to Admission Medications   Prescriptions Last Dose Informant Patient Reported? Taking?    ALPRAZolam (XANAX) 0 25 mg tablet  Self Yes No   Sig: Take 0 25 mg by mouth daily at bedtime as needed   Patient not taking: Reported on 2/18/2022    acetaminophen (TYLENOL) 325 mg tablet  Self No No   Sig: Take 2 tablets (650 mg total) by mouth every 4 (four) hours as needed for mild pain   Patient not taking: Reported on 11/19/2021    atorvastatin (LIPITOR) 10 mg tablet  Self Yes No   Sig: Take 10 mg by mouth daily   Patient not taking: Reported on 2/18/2022    butalbital-acetaminophen-caffeine (FIORICET,ESGIC) -40 mg per tablet  Self No No   Sig: Take 1 tablet by mouth every 4 (four) hours as needed for headaches   Patient not taking: Reported on 11/19/2021    gabapentin (NEURONTIN) 300 mg capsule  Self No No   Sig: Take 1 capsule (300 mg total) by mouth 3 (three) times a day as needed (pain)   Patient not taking: Reported on 11/19/2021    hydrochlorothiazide (MICROZIDE) 12 5 mg capsule  Self Yes No Sig: Take 12 5 mg by mouth daily   ibuprofen (MOTRIN) 800 mg tablet  Self Yes No   Sig: Take 800 mg by mouth every 6 (six) hours as needed   Patient not taking: Reported on 11/19/2021    lisinopril (ZESTRIL) 20 mg tablet  Self Yes No   Sig: Take 20 mg by mouth daily   lisinopril (ZESTRIL) 30 mg tablet  Self Yes No   Sig: Take 30 mg by mouth daily   Patient not taking: Reported on 11/19/2021    meloxicam (Mobic) 15 mg tablet  Self No No   Sig: Take 1 tablet (15 mg total) by mouth daily   Patient not taking: Reported on 2/18/2022    naproxen (EC NAPROSYN) 500 MG EC tablet  Self No No   Sig: Take 1 tablet (500 mg total) by mouth 2 (two) times a day as needed for mild pain   Patient not taking: Reported on 2/18/2022    oxybutynin (DITROPAN) 5 mg tablet  Self No No   Sig: Take 1 tablet (5 mg total) by mouth every 6 (six) hours as needed (bladder spasms)   Patient not taking: Reported on 3/2/2021   phenazopyridine (PYRIDIUM) 100 mg tablet  Self Yes No   Sig: Take 100 mg by mouth 3 (three) times a day as needed for bladder spasms   Patient not taking: Reported on 11/19/2021       Facility-Administered Medications: None       Past Medical History:   Diagnosis Date    Enlarged prostate     Hyperlipidemia     Hypertension     Neuropathy        Past Surgical History:   Procedure Laterality Date    KNEE ARTHROSCOPY Right     NH CYSTOSCOPY,DIR VIS INT URETHROTOMY N/A 10/23/2020    Procedure: DIRECT VISUAL INTERAL URETHROTOMY (DVIU); Surgeon: Richie Pineda MD;  Location: [facility];  Service: Urology    NH CYSTOURETHROSCOPY N/A 10/23/2020    Procedure: Tatiana Vences;  Surgeon: Richie Pineda MD;  Location: [facility];  Service: Urology    TRANSURETHRAL RESECTION OF PROSTATE         Family History   Problem Relation Age of Onset    No Known Problems Mother      I have reviewed and agree with the history as documented      E-Cigarette/Vaping    E-Cigarette Use Never User      E-Cigarette/Vaping Substances    Nicotine No     THC No     CBD No     Flavoring No     Other No     Unknown No      Social History     Tobacco Use    Smoking status: Never Smoker    Smokeless tobacco: Never Used   Vaping Use    Vaping Use: Never used   Substance Use Topics    Alcohol use: No    Drug use: No       Review of Systems   Constitutional: Negative for chills and fever  HENT: Negative for congestion, ear pain and sore throat  Eyes: Negative for pain and visual disturbance  Respiratory: Negative for cough, shortness of breath and wheezing  Cardiovascular: Negative for chest pain and leg swelling  Gastrointestinal: Negative for abdominal pain, diarrhea, nausea and vomiting  Genitourinary: Positive for dysuria and testicular pain  Negative for flank pain, frequency, hematuria and urgency  Musculoskeletal: Negative for neck pain and neck stiffness  Skin: Negative for rash and wound  Neurological: Negative for weakness, numbness and headaches  Psychiatric/Behavioral: Negative for agitation and confusion  All other systems reviewed and are negative  Physical Exam  Physical Exam  Vitals and nursing note reviewed  Constitutional:       Appearance: He is well-developed  HENT:      Head: Normocephalic and atraumatic  Eyes:      Pupils: Pupils are equal, round, and reactive to light  Cardiovascular:      Rate and Rhythm: Normal rate and regular rhythm  Pulmonary:      Effort: Pulmonary effort is normal       Breath sounds: Normal breath sounds  Abdominal:      General: Bowel sounds are normal  There is no distension  Palpations: Abdomen is soft  Tenderness: There is no abdominal tenderness  Genitourinary:     Testes:         Right: Tenderness not present  Left: Tenderness present  Musculoskeletal:         General: Normal range of motion  Cervical back: Normal range of motion and neck supple  Skin:     General: Skin is warm and dry     Neurological: General: No focal deficit present  Mental Status: He is alert and oriented to person, place, and time  Comments: No focal deficits         Vital Signs  ED Triage Vitals   Temperature Pulse Respirations Blood Pressure SpO2   03/20/22 1124 03/20/22 1124 03/20/22 1124 03/20/22 1124 03/20/22 1124   100 °F (37 8 °C) (!) 109 17 (!) 171/102 97 %      Temp Source Heart Rate Source Patient Position - Orthostatic VS BP Location FiO2 (%)   03/20/22 1124 -- 03/20/22 1124 03/20/22 1124 --   Oral  Sitting Right arm       Pain Score       03/20/22 1243       7           Vitals:    03/20/22 1124 03/20/22 1415 03/20/22 1500 03/20/22 1600   BP: (!) 171/102 (!) 195/93 (!) 184/89 (!) 171/84   Pulse: (!) 109 94 95 101   Patient Position - Orthostatic VS: Sitting  Sitting          Visual Acuity      ED Medications  Medications   ketorolac (TORADOL) injection 15 mg (15 mg Intravenous Given 3/20/22 1243)   sodium chloride 0 9 % bolus 1,000 mL (0 mL Intravenous Stopped 3/20/22 1444)   iohexol (OMNIPAQUE) 350 MG/ML injection (SINGLE-DOSE) 100 mL (100 mL Intravenous Given 3/20/22 1350)   morphine (PF) 4 mg/mL injection 4 mg (4 mg Intravenous Given 3/20/22 1436)   cephalexin (KEFLEX) capsule 500 mg (500 mg Oral Given 3/20/22 1707)       Diagnostic Studies  Results Reviewed     Procedure Component Value Units Date/Time    Chlamydia/GC amplified DNA by PCR [[id number]]  (Normal) Collected: 03/20/22 1244    Lab Status: Final result Specimen: Urine, Other Updated: 03/21/22 2226     N gonorrhoeae, DNA Probe Negative     Chlamydia trachomatis, DNA Probe Negative    Narrative:      Test performed using PCR amplification of target DNA  This test is intended as an aid in the diagnosis of Chlamydial and gonococcal disease  This test has not been evaluated in patients younger than 15years of age and is not recommended for evaluation of suspected sexual abuse    Additional testing is recommended when the results do not correlate with clinical signs and symptoms        CMP [[id number]] Collected: 03/20/22 1244    Lab Status: Final result Specimen: Blood from Arm, Right Updated: 03/20/22 1306     Sodium 139 mmol/L      Potassium 3 6 mmol/L      Chloride 102 mmol/L      CO2 28 mmol/L      ANION GAP 9 mmol/L      BUN 12 mg/dL      Creatinine 0 85 mg/dL      Glucose 109 mg/dL      Calcium 8 5 mg/dL      AST 19 U/L      ALT 48 U/L      Alkaline Phosphatase 91 U/L      Total Protein 7 5 g/dL      Albumin 4 1 g/dL      Total Bilirubin 0 64 mg/dL      eGFR 100 ml/min/1 73sq m     Narrative:      National Kidney Disease Foundation guidelines for Chronic Kidney Disease (CKD):     Stage 1 with normal or high GFR (GFR > 90 mL/min/1 73 square meters)    Stage 2 Mild CKD (GFR = 60-89 mL/min/1 73 square meters)    Stage 3A Moderate CKD (GFR = 45-59 mL/min/1 73 square meters)    Stage 3B Moderate CKD (GFR = 30-44 mL/min/1 73 square meters)    Stage 4 Severe CKD (GFR = 15-29 mL/min/1 73 square meters)    Stage 5 End Stage CKD (GFR <15 mL/min/1 73 square meters)  Note: GFR calculation is accurate only with a steady state creatinine    Lipase [[id number]]  (Normal) Collected: 03/20/22 1244    Lab Status: Final result Specimen: Blood from Arm, Right Updated: 03/20/22 1306     Lipase 82 u/L     Urine Microscopic [[id number]]  (Abnormal) Collected: 03/20/22 1245    Lab Status: Final result Specimen: Urine, Clean Catch Updated: 03/20/22 1303     RBC, UA 0-1 /hpf      WBC, UA 4-10 /hpf      Epithelial Cells Moderate /hpf      Bacteria, UA Occasional /hpf     UA w Reflex to Microscopic w Reflex to Culture [[id number]]  (Abnormal) Collected: 03/20/22 1245    Lab Status: Final result Specimen: Urine, Clean Catch Updated: 03/20/22 1253     Color, UA Yellow     Clarity, UA Clear     Specific Gravity, UA <=1 005     pH, UA 6 5     Leukocytes, UA Small     Nitrite, UA Negative     Protein, UA Negative mg/dl      Glucose, UA Negative mg/dl      Ketones, UA Negative mg/dl Urobilinogen, UA 0 2 E U /dl      Bilirubin, UA Negative     Blood, UA Trace-Intact    CBC and differential [[id number]]  (Abnormal) Collected: 03/20/22 1244    Lab Status: Final result Specimen: Blood from Arm, Right Updated: 03/20/22 1252     WBC 18 01 Thousand/uL      RBC 5 08 Million/uL      Hemoglobin 15 2 g/dL      Hematocrit 44 4 %      MCV 87 fL      MCH 29 9 pg      MCHC 34 2 g/dL      RDW 13 0 %      MPV 11 4 fL      Platelets 302 Thousands/uL      nRBC 0 /100 WBCs      Neutrophils Relative 87 %      Immat GRANS % 0 %      Lymphocytes Relative 7 %      Monocytes Relative 6 %      Eosinophils Relative 0 %      Basophils Relative 0 %      Neutrophils Absolute 15 51 Thousands/µL      Immature Grans Absolute 0 08 Thousand/uL      Lymphocytes Absolute 1 23 Thousands/µL      Monocytes Absolute 1 12 Thousand/µL      Eosinophils Absolute 0 01 Thousand/µL      Basophils Absolute 0 06 Thousands/µL                  CT abdomen pelvis with contrast   Final Result by Tao Mueller MD (03/20 1883)      No evidence of small bowel obstruction or inflammatory changes within the abdomen or pelvis  No hydronephrosis  Mild distention of the bladder but without significant wall thickening  There is some dilatation of the urethra into the prostatic segment of uncertain significance and should be correlated with any prior prostate procedures or voiding dysfunction  A    midline cyst in the prostate is also possible  Some inflammation is not excluded and should be correlated with urinalysis  Urologic follow-up be useful  Slightly prominent left external iliac node may be reactive  The study was marked in [facility] for immediate notification  Workstation performed: [id number]         US scrotum and testicles   Final Result by Louann Crystal MD (03/20 6511)       Bilateral lower pole peripheral hypoechoic areas, larger on the left, with ill-defined, wedge-shaped appearance and no internal vascularity    This is of uncertain etiology though given the presentation of left greater than right testicular pain, an acute    process is not excluded  Possibilities include infarct and infection  Follow-up with urology is recommended for tumor marker evaluation and ultrasound surveillance as neoplasm cannot be excluded  The study was marked in [facility] for immediate notification  Workstation performed: [id number]                    Procedures  Procedures         ED Course  ED Course as of 03/22/22 8939   Josiah Fill Mar 20, 2022   1225 Dysuria, subjective fever, chills, body aches - since yesterday  Ibuprofen last night  1410 WBC(!): 18 01   1410 Epithelial Cells(!): Moderate   1554 Urology TT    1633 Spoke with Andreina Huynh from urology- recommends treating with abx and getting pvr  PVR is 15 ML - urology made aware and states that he can follow up with them as outpatient                            SBIRT 20yo+      Most Recent Value   SBIRT (22 yo +)    In order to provide better care to our patients, we are screening all of our patients for alcohol and drug use  Would it be okay to ask you these screening questions? No Filed at: 03/20/2022 1602                    MDM  Number of Diagnoses or Management Options  UTI (urinary tract infection): new and requires workup  Diagnosis management comments: Patient with testicular pain, dysuria, and lower abd pain- will get labs, ct scan, and US  Will reassess for dispo  Patient reevaluated and feels improved  Patient updated on results of tests  Discharge instructions given including medications, follow-up, and return precautions  Patient demonstrates verbal understanding and agrees with plan         Amount and/or Complexity of Data Reviewed  Clinical lab tests: ordered and reviewed  Tests in the radiology section of CPT®: ordered and reviewed  Tests in the medicine section of CPT®: ordered and reviewed  Discussion of test results with the performing providers: yes  Decide to obtain previous medical records or to obtain history from someone other than the patient: yes  Obtain history from someone other than the patient: yes  Review and summarize past medical records: yes  Discuss the patient with other providers: yes  Independent visualization of images, tracings, or specimens: yes    Patient Progress  Patient progress: improved      Disposition  Final diagnoses:   UTI (urinary tract infection)     Time reflects when diagnosis was documented in both MDM as applicable and the Disposition within this note     Time User Action Codes Description Comment    3/20/2022  5:01 PM Jeff BENTLEY Add [N39 0] UTI (urinary tract infection)       ED Disposition     ED Disposition Condition Date/Time Comment    Discharge Stable Sun Mar 20, 2022  4:59 PM [address] discharge to home/self care              Follow-up Information     Follow up With Specialties Details Why Contact Info Additional [address] [place] [facility] Urology Call in 1 day for follow up within 1 week [address]  [address]  [address] [id number] [phone] [facility], [address] [facility], [address], [facility], [place], [address]    [address] [facility] Emergency Department Emergency Medicine Go to  immediately for any new or worsening symptoms Priyanka Vora [address] [facility] [address] [facility] Emergency Department, [address] [place] [address], Psychiatric hospital          Discharge Medication List as of 3/20/2022  5:03 PM      START taking these medications    Details   cephalexin (KEFLEX) 500 mg capsule Take 1 capsule (500 mg total) by mouth every 6 (six) hours for 7 days, Starting Sun 3/20/2022, Until Sun 3/27/2022, Print         CONTINUE these medications which have NOT CHANGED    Details   acetaminophen (TYLENOL) 325 mg tablet Take 2 tablets (650 mg total) by mouth every 4 (four) hours as needed for mild pain, Starting Fri 10/23/2020, No Print      ALPRAZolam (XANAX) 0 25 mg tablet Take 0 25 mg by mouth daily at bedtime as needed, Starting Wed 6/3/2020, Historical Med      atorvastatin (LIPITOR) 10 mg tablet Take 10 mg by mouth daily, Historical Med      !! lisinopril (ZESTRIL) 20 mg tablet Take 20 mg by mouth daily, Starting Mon 5/18/2020, Historical Med      butalbital-acetaminophen-caffeine (FIORICET,ESGIC) -40 mg per tablet Take 1 tablet by mouth every 4 (four) hours as needed for headaches, Starting Thu 5/20/2021, Normal      gabapentin (NEURONTIN) 300 mg capsule Take 1 capsule (300 mg total) by mouth 3 (three) times a day as needed (pain), Starting Fri 10/23/2020, Normal      hydrochlorothiazide (MICROZIDE) 12 5 mg capsule Take 12 5 mg by mouth daily, Starting Mon 10/25/2021, Historical Med      ibuprofen (MOTRIN) 800 mg tablet Take 800 mg by mouth every 6 (six) hours as needed, Starting Sat 8/7/2021, Until Sun 8/7/2022 at 2359, Historical Med      !! lisinopril (ZESTRIL) 30 mg tablet Take 30 mg by mouth daily, Starting Fri 10/22/2021, Historical Med      meloxicam (Mobic) 15 mg tablet Take 1 tablet (15 mg total) by mouth daily, Starting Fri 11/19/2021, Normal      naproxen (EC NAPROSYN) 500 MG EC tablet Take 1 tablet (500 mg total) by mouth 2 (two) times a day as needed for mild pain, Starting Wed 11/17/2021, Normal      oxybutynin (DITROPAN) 5 mg tablet Take 1 tablet (5 mg total) by mouth every 6 (six) hours as needed (bladder spasms), Starting Fri 10/23/2020, Normal      phenazopyridine (PYRIDIUM) 100 mg tablet Take 100 mg by mouth 3 (three) times a day as needed for bladder spasms, Historical Med       !! - Potential duplicate medications found  Please discuss with provider  No discharge procedures on file      PDMP Review     None          ED Provider  Electronically Signed by           Leilani Harding DO  03/22/22 4242

## 2022-04-02 ENCOUNTER — HOSPITAL ENCOUNTER (OUTPATIENT)
Dept: RADIOLOGY | Facility: HOSPITAL | Age: 52
Discharge: HOME | End: 2022-04-02
Payer: COMMERCIAL

## 2022-04-02 ENCOUNTER — OFFICE VISIT (OUTPATIENT)
Dept: LAB | Facility: HOSPITAL | Age: 52
End: 2022-04-02
Payer: COMMERCIAL

## 2022-04-02 DIAGNOSIS — Z01.818 PREOP TESTING: ICD-10-CM

## 2022-04-02 LAB
ATRIAL RATE: 67 BPM
ATRIAL RATE: 67 BPM
P AXIS: -5 DEGREES
P AXIS: 14 DEGREES
PR INTERVAL: 168 MS
PR INTERVAL: 186 MS
QRS AXIS: -32 DEGREES
QRS AXIS: -34 DEGREES
QRSD INTERVAL: 108 MS
QRSD INTERVAL: 116 MS
QT INTERVAL: 406 MS
QT INTERVAL: 410 MS
QTC INTERVAL: 429 MS
QTC INTERVAL: 433 MS
T WAVE AXIS: 30 DEGREES
T WAVE AXIS: 38 DEGREES
VENTRICULAR RATE: 67 BPM
VENTRICULAR RATE: 67 BPM

## 2022-04-02 PROCEDURE — 71046 X-RAY EXAM CHEST 2 VIEWS: CPT

## 2022-04-02 PROCEDURE — 93005 ELECTROCARDIOGRAM TRACING: CPT

## 2022-04-02 PROCEDURE — 93010 ELECTROCARDIOGRAM REPORT: CPT | Performed by: INTERNAL MEDICINE

## 2022-04-08 ENCOUNTER — TELEPHONE (OUTPATIENT)
Dept: UROLOGY | Facility: MEDICAL CENTER | Age: 52
End: 2022-04-08

## 2022-04-08 NOTE — TELEPHONE ENCOUNTER
Patient last seenin our [facility] location by Dr Micheal Saab  On 3/2/21  Returned call to patient wife   Patient does not speak Abhishek Ornelas and requests that we speak with his wife for translation  Patient was seen in the ED on 3/20/22 UA/UC/ blood work and US/ CT    Patient was treated for UTI and prescribed 500 mg of Keflex for seven days which was reported by patients wife to help with symptoms   Ed did advise to contact urology in one week for fu  Patient reported by wife as now  is having burning in lower pelvis and scrotal area  When she asked her  he was just able to describe prostate pain  No reported fevers or chills, no swelling reported       Per previous encounter per"  Danette Liao pa-C 1-2 weeks with Zoila

## 2022-04-08 NOTE — TELEPHONE ENCOUNTER
Patient seen by Dr Gladis Anthony in [facility]    Patient's wife called he is having burning sensation inside  He was in ER two weeks for uti and he was taking antibiotics  And he finished them  Wife insist that it could be related to his prostate  Or not sure if he needs to have urine test to make sure no infection  Patient speaks Samoan  She is not able to translate due to being on a job training

## 2022-04-08 NOTE — TELEPHONE ENCOUNTER
Wife calling back to speak with clinical, stated that she would like to get the patient scheduled to be seen by Dr Ed Daniel as soon as possible

## 2022-04-11 NOTE — TELEPHONE ENCOUNTER
Called Krishna Matamoros back and explained that Dr Ronak Odom doesn't have any appointments until the end of June and he would behove them to keep appointment on Wednesday   She agreed

## 2022-04-11 NOTE — TELEPHONE ENCOUNTER
Patient's wife called and stated that Dr Camille Somers did a procedure for the patient and they would really like to see him for the appointment he is scheduled for  The wife was informed that he is seeing one of the doctor's AP and that the doctor does not have any openings  Patient's wife is still insistent on seeing Dr Camille Somers  Please advice        Patient can be reached at [phone]

## 2022-04-12 NOTE — PROGRESS NOTES
4/13/2022      Chief Complaint   Patient presents with    Benign Prostatic Hypertrophy     UNABLE TO VOID WENT IN WAITING ROOM          Assessment and Plan    46 y o  male     1  Dysuria  - Finished antibiotics for acute cystitis  - Prescribed Bactrim by Kia Reyes on Monday for prostatitis  - UA today unable to be obtained  - PVR today 22 mL  - patient and wife adamant about further workup given persistent symptoms  - patient just had cystoscopy with Kia Reyes, which shows inflamed prostate without any bladder or urethral abnormalities  - follow-up for transrectal ultrasound and possible cystoscopy for further evaluation    2  Bilateral scrotal discomfort  - US scrotum (3/20/22) showing bilateral wedge shaped area of hypoechogenicity with recommendations for tumor markers and surveillance US  - Tumor markers sent      History of Present Illness  Chelsie Puckett is a 46 y o  male patient with history of dysuria, bilateral scrotal discomfort here for ER follow up  Patient was seen in the emergency department 3/20/22  Patient was given antibiotics for acute cystitis  States he is still having dysuria and urinary symptoms  States he is also having bilateral testicular discomfort  US at the ER showing bilateral wedge shaped area of hypoechogenicity  CT showing dilation of the urethra into the prostatic segment  Patient was seen by [facility] 2 days ago and had cystoscopy performed which was negative for urethral stricture, bladder abnormalities, urethra abnormalities  Did show inflamed prostate  Urine culture was sent at that time and patient was given Bactrim and Pyridium  Review of Systems   Constitutional: Negative for activity change, appetite change, chills and fever  HENT: Negative for congestion and trouble swallowing  Respiratory: Negative for cough and shortness of breath  Cardiovascular: Negative for chest pain, palpitations and leg swelling     Gastrointestinal: Negative for abdominal pain, constipation, diarrhea, nausea and vomiting  Genitourinary: Negative for difficulty urinating, dysuria, flank pain, frequency, hematuria and urgency  Musculoskeletal: Negative for back pain and gait problem  Skin: Negative for wound  Allergic/Immunologic: Negative for immunocompromised state  Neurological: Negative for dizziness and syncope  Hematological: Does not bruise/bleed easily  Psychiatric/Behavioral: Negative for confusion  All other systems reviewed and are negative  Vitals  Vitals:    04/13/22 0754   BP: 150/88   BP Location: Left arm   Patient Position: Sitting   Cuff Size: Large   Pulse: 96   Resp: 16   SpO2: 96%   Weight: 94 7 kg (208 lb 12 8 oz)   Height: 5' 5" (1 651 m)       Physical Exam  Constitutional:       General: He is not in acute distress  Appearance: Normal appearance  He is not ill-appearing, toxic-appearing or diaphoretic  HENT:      Head: Normocephalic  Nose: No congestion  Eyes:      General: No scleral icterus  Right eye: No discharge  Left eye: No discharge  Conjunctiva/sclera: Conjunctivae normal       Pupils: Pupils are equal, round, and reactive to light  Pulmonary:      Effort: Pulmonary effort is normal    Musculoskeletal:      Cervical back: Normal range of motion  Skin:     General: Skin is warm and dry  Coloration: Skin is not jaundiced or pale  Findings: No bruising, erythema, lesion or rash  Neurological:      General: No focal deficit present  Mental Status: He is alert and oriented to person, place, and time  Mental status is at baseline  Gait: Gait normal    Psychiatric:         Mood and Affect: Mood normal          Behavior: Behavior normal          Thought Content:  Thought content normal          Judgment: Judgment normal            Past History  Past Medical History:   Diagnosis Date    Enlarged prostate     Hyperlipidemia     Hypertension     Neuropathy Social History     Socioeconomic History    Marital status: /Civil Union     Spouse name: None    Number of children: None    Years of education: None    Highest education level: None   Occupational History    None   Tobacco Use    Smoking status: Never Smoker    Smokeless tobacco: Never Used   Vaping Use    Vaping Use: Never used   Substance and Sexual Activity    Alcohol use: No    Drug use: No    Sexual activity: None   Other Topics Concern    None   Social History Narrative    None     Social Determinants of Health     Financial Resource Strain: Not on file   Food Insecurity: Not on file   Transportation Needs: Not on file   Physical Activity: Not on file   Stress: Not on file   Social Connections: Not on file   Intimate Partner Violence: Not on file   Housing Stability: Not on file     Social History     Tobacco Use   Smoking Status Never Smoker   Smokeless Tobacco Never Used     Family History   Problem Relation Age of Onset    No Known Problems Mother        The following portions of the patient's history were reviewed and updated as appropriate: allergies, current medications, past medical history, past social history, past surgical history and problem list     Results  No results found for this or any previous visit (from the past 1 hour(s))  ]  No results found for: PSA  Lab Results   Component Value Date    CALCIUM 8 8 04/02/2022    K 3 8 04/02/2022    CO2 31 04/02/2022     04/02/2022    BUN 15 04/02/2022    CREATININE 0 93 04/02/2022     Lab Results   Component Value Date    WBC 7 96 04/02/2022    HGB 16 2 04/02/2022    HCT 47 4 04/02/2022    MCV 87 04/02/2022     04/02/2022       Sandeep Hennessy PA-C

## 2022-04-13 ENCOUNTER — OFFICE VISIT (OUTPATIENT)
Dept: UROLOGY | Facility: CLINIC | Age: 52
End: 2022-04-13
Payer: COMMERCIAL

## 2022-04-13 ENCOUNTER — APPOINTMENT (OUTPATIENT)
Dept: LAB | Facility: HOSPITAL | Age: 52
End: 2022-04-13
Payer: COMMERCIAL

## 2022-04-13 VITALS
HEIGHT: 65 IN | WEIGHT: 208.8 LBS | BODY MASS INDEX: 34.79 KG/M2 | SYSTOLIC BLOOD PRESSURE: 150 MMHG | OXYGEN SATURATION: 96 % | DIASTOLIC BLOOD PRESSURE: 88 MMHG | HEART RATE: 96 BPM | RESPIRATION RATE: 16 BRPM

## 2022-04-13 DIAGNOSIS — R93.89 ULTRASOUND SCAN ABNORMAL: Primary | ICD-10-CM

## 2022-04-13 DIAGNOSIS — R93.89 ULTRASOUND SCAN ABNORMAL: ICD-10-CM

## 2022-04-13 DIAGNOSIS — N41.9 PROSTATITIS, UNSPECIFIED PROSTATITIS TYPE: ICD-10-CM

## 2022-04-13 LAB
AFP-TM SERPL-MCNC: 2.5 NG/ML (ref 0.5–8)
HCG-TM SERPL-SCNC: <2 MLU/ML
LDH SERPL-CCNC: 183 U/L (ref 81–234)
POST-VOID RESIDUAL VOLUME, ML POC: 22 ML

## 2022-04-13 PROCEDURE — 36415 COLL VENOUS BLD VENIPUNCTURE: CPT

## 2022-04-13 PROCEDURE — 82105 ALPHA-FETOPROTEIN SERUM: CPT

## 2022-04-13 PROCEDURE — 99213 OFFICE O/P EST LOW 20 MIN: CPT | Performed by: PHYSICIAN ASSISTANT

## 2022-04-13 PROCEDURE — 83615 LACTATE (LD) (LDH) ENZYME: CPT

## 2022-04-13 PROCEDURE — 51798 US URINE CAPACITY MEASURE: CPT | Performed by: PHYSICIAN ASSISTANT

## 2022-04-13 PROCEDURE — 84702 CHORIONIC GONADOTROPIN TEST: CPT

## 2022-04-13 RX ORDER — CIPROFLOXACIN 500 MG/1
500 TABLET, FILM COATED ORAL EVERY 12 HOURS SCHEDULED
Qty: 28 TABLET | Refills: 0 | Status: CANCELLED | OUTPATIENT
Start: 2022-04-13 | End: 2022-04-27

## 2022-04-15 ENCOUNTER — TELEPHONE (OUTPATIENT)
Dept: OTHER | Facility: OTHER | Age: 52
End: 2022-04-15

## 2022-04-18 NOTE — TELEPHONE ENCOUNTER
Called and spoke to patient's wife Pavel Spencer  Informed wife that all lab work regarding tumor markers were normal  Pavel Spencer verbalized understanding

## 2022-04-19 ENCOUNTER — OFFICE VISIT (OUTPATIENT)
Dept: INTERNAL MEDICINE CLINIC | Facility: CLINIC | Age: 52
End: 2022-04-19
Payer: COMMERCIAL

## 2022-04-19 VITALS
SYSTOLIC BLOOD PRESSURE: 168 MMHG | RESPIRATION RATE: 16 BRPM | OXYGEN SATURATION: 96 % | TEMPERATURE: 98.4 F | HEART RATE: 80 BPM | BODY MASS INDEX: 34.66 KG/M2 | DIASTOLIC BLOOD PRESSURE: 110 MMHG | HEIGHT: 65 IN | WEIGHT: 208 LBS

## 2022-04-19 DIAGNOSIS — Z23 ENCOUNTER FOR IMMUNIZATION: ICD-10-CM

## 2022-04-19 DIAGNOSIS — R05.9 COUGH: ICD-10-CM

## 2022-04-19 DIAGNOSIS — Z11.59 NEED FOR HEPATITIS C SCREENING TEST: ICD-10-CM

## 2022-04-19 DIAGNOSIS — I51.7 LEFT VENTRICULAR HYPERTROPHY BY ELECTROCARDIOGRAM: ICD-10-CM

## 2022-04-19 DIAGNOSIS — Z11.4 SCREENING FOR HIV (HUMAN IMMUNODEFICIENCY VIRUS): ICD-10-CM

## 2022-04-19 DIAGNOSIS — I10 PRIMARY HYPERTENSION: Primary | ICD-10-CM

## 2022-04-19 DIAGNOSIS — R07.9 CHEST PAIN, UNSPECIFIED TYPE: ICD-10-CM

## 2022-04-19 DIAGNOSIS — E78.2 MIXED HYPERLIPIDEMIA: ICD-10-CM

## 2022-04-19 PROCEDURE — 99205 OFFICE O/P NEW HI 60 MIN: CPT | Performed by: INTERNAL MEDICINE

## 2022-04-19 PROCEDURE — 93000 ELECTROCARDIOGRAM COMPLETE: CPT | Performed by: INTERNAL MEDICINE

## 2022-04-19 RX ORDER — SULFAMETHOXAZOLE AND TRIMETHOPRIM 800; 160 MG/1; MG/1
1 TABLET ORAL 2 TIMES DAILY
Status: ON HOLD | COMMUNITY
Start: 2022-04-11 | End: 2022-06-02 | Stop reason: ALTCHOICE

## 2022-04-19 RX ORDER — VALSARTAN 320 MG/1
320 TABLET ORAL DAILY
Qty: 90 TABLET | Refills: 3 | Status: SHIPPED | OUTPATIENT
Start: 2022-04-19

## 2022-04-19 RX ORDER — AMLODIPINE BESYLATE 5 MG/1
5 TABLET ORAL DAILY
Qty: 90 TABLET | Refills: 3 | Status: SHIPPED | OUTPATIENT
Start: 2022-04-19

## 2022-04-19 RX ORDER — METOPROLOL SUCCINATE 25 MG/1
25 TABLET, EXTENDED RELEASE ORAL DAILY
Qty: 30 TABLET | Refills: 5 | Status: SHIPPED | OUTPATIENT
Start: 2022-04-19

## 2022-04-19 NOTE — PROGRESS NOTES
Assessment/Plan:       Diagnoses and all orders for this visit:    Primary hypertension  -     POCT ECG  -     valsartan (DIOVAN) 320 MG tablet; Take 1 tablet (320 mg total) by mouth daily  -     amLODIPine (NORVASC) 5 mg tablet; Take 1 tablet (5 mg total) by mouth daily  -     metoprolol succinate (Toprol XL) 25 mg 24 hr tablet; Take 1 tablet (25 mg total) by mouth daily  -     TSH, 3rd generation with Free T4 reflex; Future    Need for hepatitis C screening test  -     Hepatitis C Antibody ([facility]); Future    Screening for HIV (human immunodeficiency virus)  -     HIV 1/2 Antigen/Antibody (4th Generation) w Reflex [facility]; Future    Encounter for immunization    Mixed hyperlipidemia    Chest pain, unspecified type  -     Echo complete w/ contrast if indicated; Future  -     NM myocardial perfusion spect (rx stress and/or rest); Future    Left ventricular hypertrophy by electrocardiogram  -     Echo complete w/ contrast if indicated; Future  -     NM myocardial perfusion spect (rx stress and/or rest); Future  -     NT-BNP PRO; Future    Cough  -     XR chest pa & lateral; Future  -     XR sinuses routine 3+ views; Future    Other orders  -     sulfamethoxazole-trimethoprim (BACTRIM DS) 800-160 mg per tablet; Take 1 tablet by mouth 2 (two) times a day                Subjective:      Patient ID: Megan Adams is a 46 y o  male  51-year-old man  Uncontrolled hypertension  Currently taking lisinopril 30 mg daily and blood pressure is 160/110  Complaint of daily frontal headache   Complaint of occasional intermittent chest pain with no particular pattern  Not classically anginal   Nonexertional   Not associated with diaphoresis dyspnea nausea or vomiting  History of prostatic enlargement and follows with Urology  recent laboratory testing documents normal GFR, normal LFTs, hemoglobin A1c 5 6, normal CBC  Works construction    He is not limited by chest pain or trouble breathing         The patient has a knee problem which require surgery  Apparently, surgery schedule for Friday  However, he is not "cleared" for surgery  He is not optimized  Blood pressure is uncontrolled  The patient give me a complaint of chest pain  He has an abnormal electrocardiogram     He needs normalization of blood pressure, a stress test, and echocardiogram       The following portions of the patient's history were reviewed and updated as appropriate:   He has a past medical history of Enlarged prostate, Hyperlipidemia, Hypertension, and Neuropathy  ,  does not have any pertinent problems on file  ,   has a past surgical history that includes Knee arthroscopy (Right); Transurethral resection of prostate; pr cystourethroscopy (N/A, 10/23/2020); and pr cystoscopy,dir vis int urethrotomy (N/A, 10/23/2020)  ,  family history includes No Known Problems in his mother  ,   reports that he has never smoked  He has never used smokeless tobacco  He reports that he does not drink alcohol and does not use drugs  ,  has No Known Allergies     Current Outpatient Medications   Medication Sig Dispense Refill    acetaminophen (TYLENOL) 325 mg tablet Take 2 tablets (650 mg total) by mouth every 4 (four) hours as needed for mild pain (Patient not taking: Reported on 11/19/2021 ) 30 tablet 0    ALPRAZolam (XANAX) 0 25 mg tablet Take 0 25 mg by mouth daily at bedtime as needed (Patient not taking: Reported on 2/18/2022 )      amLODIPine (NORVASC) 5 mg tablet Take 1 tablet (5 mg total) by mouth daily 90 tablet 3    atorvastatin (LIPITOR) 10 mg tablet Take 10 mg by mouth daily (Patient not taking: Reported on 2/18/2022 )      butalbital-acetaminophen-caffeine (FIORICET,ESGIC) -40 mg per tablet Take 1 tablet by mouth every 4 (four) hours as needed for headaches (Patient not taking: Reported on 11/19/2021 ) 30 tablet 0    gabapentin (NEURONTIN) 300 mg capsule Take 1 capsule (300 mg total) by mouth 3 (three) times a day as needed (pain) (Patient not taking: Reported on 11/19/2021 ) 15 capsule 0    hydrochlorothiazide (MICROZIDE) 12 5 mg capsule Take 12 5 mg by mouth daily      ibuprofen (MOTRIN) 800 mg tablet Take 800 mg by mouth every 6 (six) hours as needed (Patient not taking: Reported on 11/19/2021 )      lisinopril (ZESTRIL) 20 mg tablet Take 20 mg by mouth daily      lisinopril (ZESTRIL) 30 mg tablet Take 30 mg by mouth daily (Patient not taking: Reported on 11/19/2021 )      meloxicam (Mobic) 15 mg tablet Take 1 tablet (15 mg total) by mouth daily (Patient not taking: Reported on 2/18/2022 ) 30 tablet 0    metoprolol succinate (Toprol XL) 25 mg 24 hr tablet Take 1 tablet (25 mg total) by mouth daily 30 tablet 5    naproxen (EC NAPROSYN) 500 MG EC tablet Take 1 tablet (500 mg total) by mouth 2 (two) times a day as needed for mild pain (Patient not taking: Reported on 2/18/2022 ) 10 tablet 0    oxybutynin (DITROPAN) 5 mg tablet Take 1 tablet (5 mg total) by mouth every 6 (six) hours as needed (bladder spasms) (Patient not taking: Reported on 3/2/2021) 30 tablet 0    phenazopyridine (PYRIDIUM) 100 mg tablet Take 100 mg by mouth 3 (three) times a day as needed for bladder spasms        sulfamethoxazole-trimethoprim (BACTRIM DS) 800-160 mg per tablet Take 1 tablet by mouth 2 (two) times a day      valsartan (DIOVAN) 320 MG tablet Take 1 tablet (320 mg total) by mouth daily 90 tablet 3     No current facility-administered medications for this visit  Review of Systems   Respiratory: Positive for cough  Cardiovascular: Positive for chest pain  Neurological: Positive for headaches  All other systems reviewed and are negative  Objective:  Vitals:    04/19/22 1753   BP: (!) 168/110   Pulse: 80   Resp: 16   Temp: 98 4 °F (36 9 °C)   SpO2: 96%      Physical Exam  Constitutional:       Appearance: He is well-developed        Comments:  Overweight male patient who appears to be stated age   HENT: Head: Normocephalic and atraumatic  Eyes:      General: No scleral icterus  Pupils: Pupils are equal, round, and reactive to light  Neck:      Thyroid: No thyromegaly  Trachea: No tracheal deviation  Cardiovascular:      Rate and Rhythm: Normal rate and regular rhythm  Heart sounds: Murmur heard  Systolic murmur is present with a grade of 1/6  No gallop  Comments:  Normal S1, low grade 1/6 murmur, accentuated S2  Pulmonary:      Effort: Pulmonary effort is normal  No respiratory distress  Breath sounds: No wheezing or rales  Abdominal:      General: Bowel sounds are normal       Palpations: Abdomen is soft  Tenderness: There is no abdominal tenderness  Musculoskeletal:         General: No tenderness or deformity  Normal range of motion  Cervical back: Normal range of motion and neck supple  Right lower leg: No edema  Left lower leg: No edema  Skin:     General: Skin is warm and dry  Neurological:      Mental Status: He is alert and oriented to person, place, and time  Coordination: Coordination normal       Deep Tendon Reflexes: Reflexes are normal and symmetric  Patient Instructions   80-year-old man with uncontrolled hypertension  Echocardiographic evidence of LVH   A complaint of chest pain which well does not sound cardiac in nature needs to be investigated     Fortunately, major laboratory testing has been done  We only need to see the TSH  Recommendation is echocardiogram, and nonwalking stress test     Discontinue lisinopril  Use valsartan 320, amlodipine 5, and metoprolol 25 in the interval     Revisit with me in 2-4 weeks to recheck the pressure but monitor the pressure at home     the patient is not optimized for proposed surgery  He needs to get stress testing done and needs to get the blood pressure down

## 2022-04-19 NOTE — PATIENT INSTRUCTIONS
71-year-old man with uncontrolled hypertension  Echocardiographic evidence of LVH   A complaint of chest pain which well does not sound cardiac in nature needs to be investigated     Fortunately, major laboratory testing has been done  We only need to see the TSH  Recommendation is echocardiogram, and nonwalking stress test     Discontinue lisinopril  Use valsartan 320, amlodipine 5, and metoprolol 25 in the interval     Revisit with me in 2-4 weeks to recheck the pressure but monitor the pressure at home     the patient is not optimized for proposed surgery  He needs to get stress testing done and needs to get the blood pressure down

## 2022-04-20 ENCOUNTER — TELEPHONE (OUTPATIENT)
Dept: OTHER | Facility: OTHER | Age: 52
End: 2022-04-20

## 2022-04-20 ENCOUNTER — HOSPITAL ENCOUNTER (OUTPATIENT)
Dept: RADIOLOGY | Facility: HOSPITAL | Age: 52
Discharge: HOME | End: 2022-04-20
Payer: COMMERCIAL

## 2022-04-20 ENCOUNTER — TELEPHONE (OUTPATIENT)
Dept: INTERNAL MEDICINE CLINIC | Facility: CLINIC | Age: 52
End: 2022-04-20

## 2022-04-20 ENCOUNTER — APPOINTMENT (OUTPATIENT)
Dept: LAB | Facility: HOSPITAL | Age: 52
End: 2022-04-20
Payer: COMMERCIAL

## 2022-04-20 ENCOUNTER — HOSPITAL ENCOUNTER (OUTPATIENT)
Dept: RADIOLOGY | Facility: HOSPITAL | Age: 52
Discharge: HOME | End: 2022-04-20
Attending: INTERNAL MEDICINE
Payer: COMMERCIAL

## 2022-04-20 DIAGNOSIS — R05.9 COUGH: ICD-10-CM

## 2022-04-20 DIAGNOSIS — I51.7 LEFT VENTRICULAR HYPERTROPHY BY ELECTROCARDIOGRAM: ICD-10-CM

## 2022-04-20 DIAGNOSIS — I10 PRIMARY HYPERTENSION: ICD-10-CM

## 2022-04-20 DIAGNOSIS — Z11.4 SCREENING FOR HIV (HUMAN IMMUNODEFICIENCY VIRUS): ICD-10-CM

## 2022-04-20 DIAGNOSIS — Z11.59 NEED FOR HEPATITIS C SCREENING TEST: ICD-10-CM

## 2022-04-20 LAB
HCV AB SER QL: NORMAL
NT-PROBNP SERPL-MCNC: 10 PG/ML
TSH SERPL DL<=0.05 MIU/L-ACNC: 0.69 UIU/ML (ref 0.45–4.5)

## 2022-04-20 PROCEDURE — 36415 COLL VENOUS BLD VENIPUNCTURE: CPT

## 2022-04-20 PROCEDURE — 86803 HEPATITIS C AB TEST: CPT

## 2022-04-20 PROCEDURE — 70220 X-RAY EXAM OF SINUSES: CPT

## 2022-04-20 PROCEDURE — 87389 HIV-1 AG W/HIV-1&-2 AB AG IA: CPT

## 2022-04-20 PROCEDURE — 83880 ASSAY OF NATRIURETIC PEPTIDE: CPT

## 2022-04-20 PROCEDURE — 84443 ASSAY THYROID STIM HORMONE: CPT

## 2022-04-20 PROCEDURE — 71046 X-RAY EXAM CHEST 2 VIEWS: CPT

## 2022-04-20 NOTE — TELEPHONE ENCOUNTER
Fernando's wife Georgina Mitchell called about the ECHO being done today- if possible    No Stephy Waldron is needed for the ECHO; per AINSLEY    I gave her the Central Scheduling p# to schedule with [place]  I wanted to get someone to help her schedule it, but she said she can take care of it- her  was on his way  I gave her the phone# for [facility] too- to try and schedule it

## 2022-04-20 NOTE — TELEPHONE ENCOUNTER
Patient wife stated that her  needs prior authorization for testing prior to procedure on 4/22/2022

## 2022-04-20 NOTE — TELEPHONE ENCOUNTER
I checked the status with NM Myocardial at 1:15PM  Clinical was received- has to be reviewed    I called Fernando and let him know that the ECHO didn't need an auth and the NM was still in review- no approval yet  I'll call back after 4PM today      Might not get an answer until tomorrow  It is marked as URGENT

## 2022-04-20 NOTE — TELEPHONE ENCOUNTER
Checked status for NM Myocardial around 4:30PM   It's in specialist dr review    Left Fernando's wife Padmini Brenner a vm about no approval for NM yet- in specialist review    Checking back tomorrow in the AM   Site I picked is [facility]   This might have to be changed to:  [address] [facility]

## 2022-04-20 NOTE — TELEPHONE ENCOUNTER
I started the prior auth for both tests  The ECHO doesn't need an auth; per AINSLEY  Faxed clinical for NM around 10:15AM  Will call back in 3 to 4 hrs - checking status  Hoping to speak to a nurse; if possible  Per Fernando's wife; he will go to  [place]  Want to get it scheduled for tomorrow  [facility] is booked; can't squeeze him in     I left a vm on Lu's phone about the NM was started and we'll have to wait on the Tricia Moore

## 2022-04-21 ENCOUNTER — TELEPHONE (OUTPATIENT)
Dept: INTERNAL MEDICINE CLINIC | Facility: CLINIC | Age: 52
End: 2022-04-21

## 2022-04-21 ENCOUNTER — TELEPHONE (OUTPATIENT)
Dept: OBGYN CLINIC | Facility: CLINIC | Age: 52
End: 2022-04-21

## 2022-04-21 ENCOUNTER — HOSPITAL ENCOUNTER (OUTPATIENT)
Dept: NON INVASIVE DIAGNOSTICS | Facility: HOSPITAL | Age: 52
Discharge: HOME | End: 2022-04-21
Payer: COMMERCIAL

## 2022-04-21 ENCOUNTER — TELEPHONE (OUTPATIENT)
Dept: OTHER | Facility: OTHER | Age: 52
End: 2022-04-21

## 2022-04-21 VITALS
HEART RATE: 88 BPM | WEIGHT: 208 LBS | BODY MASS INDEX: 34.66 KG/M2 | HEIGHT: 65 IN | DIASTOLIC BLOOD PRESSURE: 93 MMHG | SYSTOLIC BLOOD PRESSURE: 147 MMHG

## 2022-04-21 DIAGNOSIS — R07.9 CHEST PAIN, UNSPECIFIED TYPE: ICD-10-CM

## 2022-04-21 DIAGNOSIS — I51.7 LEFT VENTRICULAR HYPERTROPHY BY ELECTROCARDIOGRAM: ICD-10-CM

## 2022-04-21 LAB
AORTIC ROOT: 3.1 CM
AORTIC VALVE MEAN VELOCITY: 10.1 M/S
APICAL FOUR CHAMBER EJECTION FRACTION: 53 %
ASCENDING AORTA: 4 CM (ref 2.09–3.13)
AV LVOT MEAN GRADIENT: 2 MMHG
AV LVOT PEAK GRADIENT: 4 MMHG
AV MEAN GRADIENT: 5 MMHG
AV PEAK GRADIENT: 8 MMHG
AV VELOCITY RATIO: 0.64
DOP CALC AO PEAK VEL: 1.46 M/S
DOP CALC AO VTI: 27.02 CM
DOP CALC LVOT PEAK VEL VTI: 17.04 CM
DOP CALC LVOT PEAK VEL: 0.94 M/S
E WAVE DECELERATION TIME: 206 MS
FRACTIONAL SHORTENING: 29 % (ref 28–44)
HIV 1+2 AB+HIV1 P24 AG SERPL QL IA: NORMAL
INTERVENTRICULAR SEPTUM IN DIASTOLE (PARASTERNAL SHORT AXIS VIEW): 1.3 CM
INTERVENTRICULAR SEPTUM: 1.3 CM (ref 0.55–1.02)
LAAS-AP4: 11.4 CM2
LEFT ATRIUM SIZE: 3.7 CM
LEFT INTERNAL DIMENSION IN SYSTOLE: 2.7 CM (ref 3.32–5.03)
LEFT VENTRICULAR INTERNAL DIMENSION IN DIASTOLE: 3.8 CM (ref 5.49–8.18)
LEFT VENTRICULAR POSTERIOR WALL IN END DIASTOLE: 1.1 CM (ref 0.53–1.01)
LEFT VENTRICULAR STROKE VOLUME: 34 ML
LVSV (TEICH): 34 ML
MV E'TISSUE VEL-LAT: 12 CM/S
MV E'TISSUE VEL-SEP: 5 CM/S
MV PEAK A VEL: 0.67 M/S
MV PEAK E VEL: 47 CM/S
MV STENOSIS PRESSURE HALF TIME: 60 MS
MV VALVE AREA P 1/2 METHOD: 3.67 CM2
RA PRESSURE ESTIMATED: 3 MMHG
RIGHT ATRIAL 2D VOLUME: 54 ML
RIGHT ATRIUM AREA SYSTOLE A4C: 18.3 CM2
RIGHT VENTRICLE ID DIMENSION: 3.3 CM
SL CV LV EF: 55
SL CV PED ECHO LEFT VENTRICLE DIASTOLIC VOLUME (MOD BIPLANE) 2D: 61 ML
SL CV PED ECHO LEFT VENTRICLE SYSTOLIC VOLUME (MOD BIPLANE) 2D: 27 ML
Z-SCORE OF ASCENDING AORTA: 5.32
Z-SCORE OF INTERVENTRICULAR SEPTUM IN END DIASTOLE: 4.25
Z-SCORE OF LEFT VENTRICULAR DIMENSION IN END DIASTOLE: -5.67
Z-SCORE OF LEFT VENTRICULAR DIMENSION IN END SYSTOLE: -3.28
Z-SCORE OF LEFT VENTRICULAR POSTERIOR WALL IN END DIASTOLE: 2.72

## 2022-04-21 PROCEDURE — 93306 TTE W/DOPPLER COMPLETE: CPT

## 2022-04-21 RX ADMIN — PERFLUTREN 0.8 ML/MIN: 6.52 INJECTION, SUSPENSION INTRAVENOUS at 12:07

## 2022-04-21 NOTE — TELEPHONE ENCOUNTER
Patients wife called he is having Lt knee surgery tomorrow 4/22 by Dr Janes Norton  And when Dr Adrien Borrero saw him the other day to clear him he ordered an Echo complete that patient is having done today and is all worried about being cleared by Dr Adrien Borrero   You can reach patient at [phone]

## 2022-04-21 NOTE — TELEPHONE ENCOUNTER
Pt needs to be cleared for    Knee surgery scheduled for tomorrow, calling for clearance, please advise

## 2022-04-21 NOTE — TELEPHONE ENCOUNTER
Oswaldo Francisco from [facility]     Dr Isreal English would not clear pt for surgery prior to getting an Echo  Echo was done yesterday  Results? Also, will Dr Isreal English clear pt for surgery or should the surg be reschedule  Oswaldo Francisco needs to close out and have a final schedule for tomorrow by 2:00 today      Please Advise:   [facility]

## 2022-04-21 NOTE — TELEPHONE ENCOUNTER
NM Myocardial was approved  Site now is with [facility]- can be changed if need be  [id number]  4/20 to 6/19/22    Knee surgery was canceled for the time being

## 2022-04-21 NOTE — TELEPHONE ENCOUNTER
Received call from Dr Errol Anand office that pt is not clear for his surgery, pt needs a stress  Left message for pt wife that surgery has been cancelled    Roel Siemens

## 2022-04-21 NOTE — TELEPHONE ENCOUNTER
PT   WIFE   CALLED   BACK   VERY  CONCERNED   BECAUSE  HIS  SURGURY   IS  TOMORROW  WITH  DR ZEE    WANTS  TO  MAKE  SURE  EVERY   THING GETS  TAKEN  CARE OF

## 2022-04-21 NOTE — TELEPHONE ENCOUNTER
Pt had ECHO done today at [address] in [facility]      The NM Myocardial is still in specialist review

## 2022-04-21 NOTE — TELEPHONE ENCOUNTER
Spoke to patient- dawit did ion Ukrainian- until patient has stress test kathe cannot clear him  Patient stated he understood   Spoke to [employer] at [employer]     she will cancel surgery and also call patient wife

## 2022-04-21 NOTE — TELEPHONE ENCOUNTER
Pt's wife states that pt s having surgery tomorrow and pt was told he can come in today to have clearance done  Wife wants office to call back right away

## 2022-04-23 NOTE — TELEPHONE ENCOUNTER
Echocardiogram shows enlarged heart    This is caused by uncontrolled blood pressure   Stress test needs to be completed before clearance family

## 2022-04-26 NOTE — TELEPHONE ENCOUNTER
NM Myocardial site was changed to [place] [address] for billing    Pt is scheduled on 5/3 at 7:45AM at [facility]    Tabby Bruce with [facility] spoke with the pt and scheduled it

## 2022-04-28 ENCOUNTER — TELEPHONE (OUTPATIENT)
Dept: UROLOGY | Facility: MEDICAL CENTER | Age: 52
End: 2022-04-28

## 2022-04-28 DIAGNOSIS — N41.9 PROSTATITIS, UNSPECIFIED PROSTATITIS TYPE: Primary | ICD-10-CM

## 2022-04-28 NOTE — TELEPHONE ENCOUNTER
Patient's wife called to check on the status of the previous message  Pt's wife states that the patient is in a lot of discomfort       Pt can be reached at [phone]

## 2022-04-28 NOTE — TELEPHONE ENCOUNTER
Patient's wife called stating patient still having a lot of discomfort even after taking the antibiotics  He does have appointment for cysto 06/20 at [place] with Dr Helms [place]  She wants to know if he can be seen sooner  Call her at [phone] (H) patient only speaks Cymro

## 2022-04-29 NOTE — TELEPHONE ENCOUNTER
Sounds like he should follow-up with [facility] as they appear to be managing him and recently had cystoscopy with their office  If opting to continue care with our practice would recommend urine testing and can treat him with longer course of antibiotics and different antibiotic (e g Cipro) for presumed prostatitis  He really should only be seeing 1 urologist for continuity of care

## 2022-04-29 NOTE — TELEPHONE ENCOUNTER
Patient recently seen in office about a week and a half ago, per office note-  "history of dysuria, bilateral scrotal discomfort here for ER follow up  Patient was seen in the emergency department 3/20/22  Patient was given antibiotics for acute cystitis  States he is still having dysuria and urinary symptoms  States he is also having bilateral testicular discomfort  US at the ER showing bilateral wedge shaped area of hypoechogenicity  CT showing dilation of the urethra into the prostatic segment  Patient was seen by [facility] 2 days ago and had cystoscopy performed which was negative for urethral stricture, bladder abnormalities, urethra abnormalities  Did show inflamed prostate  Urine culture was sent at that time and patient was given Bactrim and Pyridium "    Wife wants patient seen for another cysto sooner then June  This is earliest available

## 2022-05-03 ENCOUNTER — HOSPITAL ENCOUNTER (OUTPATIENT)
Dept: NON INVASIVE DIAGNOSTICS | Facility: CLINIC | Age: 52
Discharge: HOME | End: 2022-05-03
Payer: COMMERCIAL

## 2022-05-03 VITALS
SYSTOLIC BLOOD PRESSURE: 166 MMHG | HEIGHT: 65 IN | OXYGEN SATURATION: 97 % | BODY MASS INDEX: 34.66 KG/M2 | WEIGHT: 208 LBS | HEART RATE: 67 BPM | DIASTOLIC BLOOD PRESSURE: 96 MMHG

## 2022-05-03 DIAGNOSIS — I51.7 LEFT VENTRICULAR HYPERTROPHY BY ELECTROCARDIOGRAM: ICD-10-CM

## 2022-05-03 DIAGNOSIS — R07.9 CHEST PAIN, UNSPECIFIED TYPE: ICD-10-CM

## 2022-05-03 LAB
BASELINE ST DEPRESSION: 0 MM
MAX DIASTOLIC BP: 96 MMHG
MAX HEART RATE: 98 BPM
MAX PREDICTED HEART RATE: 168 BPM
MAX. SYSTOLIC BP: 166 MMHG
NUC STRESS DIASTOLIC VOLUME INDEX: 116 ML/M2
NUC STRESS EJECTION FRACTION: 58 %
NUC STRESS SYSTOLIC VOLUME INDEX: 49 ML/M2
PROTOCOL NAME: NORMAL
RATE PRESSURE PRODUCT: NORMAL
REASON FOR TERMINATION: NORMAL
SL CV REST NUCLEAR ISOTOPE DOSE: 10.63 MCI
SL CV STRESS NUCLEAR ISOTOPE DOSE: 32.6 MCI
SL CV STRESS RECOVERY BP: NORMAL MMHG
SL CV STRESS RECOVERY HR: 78 BPM
STRESS ANGINA INDEX: 0
STRESS BASELINE BP: NORMAL MMHG
STRESS BASELINE HR: 67 BPM
STRESS O2 SAT REST: 97 %
STRESS PEAK HR: 98 BPM
STRESS POST O2 SAT PEAK: 98 %
STRESS POST PEAK BP: 144 MMHG
STRESS ST DEPRESSION: 0 MM
STRESS/REST PERFUSION RATIO: 1.07
TARGET HR FORMULA: NORMAL
TEST INDICATION: NORMAL
TIME IN EXERCISE PHASE: NORMAL

## 2022-05-03 PROCEDURE — A9502 TC99M TETROFOSMIN: HCPCS

## 2022-05-03 PROCEDURE — 93018 CV STRESS TEST I&R ONLY: CPT | Performed by: INTERNAL MEDICINE

## 2022-05-03 PROCEDURE — 78452 HT MUSCLE IMAGE SPECT MULT: CPT

## 2022-05-03 PROCEDURE — 93016 CV STRESS TEST SUPVJ ONLY: CPT | Performed by: INTERNAL MEDICINE

## 2022-05-03 PROCEDURE — 78452 HT MUSCLE IMAGE SPECT MULT: CPT | Performed by: INTERNAL MEDICINE

## 2022-05-03 PROCEDURE — G1004 CDSM NDSC: HCPCS

## 2022-05-03 PROCEDURE — 93017 CV STRESS TEST TRACING ONLY: CPT

## 2022-05-03 RX ADMIN — REGADENOSON 0.4 MG: 0.08 INJECTION, SOLUTION INTRAVENOUS at 08:44

## 2022-05-03 NOTE — TELEPHONE ENCOUNTER
Patients wife requests to speak to PA who saw him last regarding moving up his cysto and re-checking his urine  Patient's wife could be reached at [phone]

## 2022-05-03 NOTE — TELEPHONE ENCOUNTER
Called and spoke to patient's wife  Informed patient of Rosana Yee PA-C's message below  She informed me that patient is to continue care with our office  Placed urine testing, patient will go to [facility] at [facility] tomorrow  Will monitor for results  Patient will also be added to cancellation list for sooner cysto  Patient's wife verbalized understanding

## 2022-05-06 ENCOUNTER — APPOINTMENT (OUTPATIENT)
Dept: LAB | Facility: HOSPITAL | Age: 52
End: 2022-05-06
Payer: COMMERCIAL

## 2022-05-06 DIAGNOSIS — N41.9 PROSTATITIS, UNSPECIFIED PROSTATITIS TYPE: ICD-10-CM

## 2022-05-06 LAB
BACTERIA UR QL AUTO: NORMAL /HPF
BILIRUB UR QL STRIP: NEGATIVE
CLARITY UR: CLEAR
COLOR UR: YELLOW
GLUCOSE UR STRIP-MCNC: NEGATIVE MG/DL
HGB UR QL STRIP.AUTO: NEGATIVE
KETONES UR STRIP-MCNC: NEGATIVE MG/DL
LEUKOCYTE ESTERASE UR QL STRIP: NEGATIVE
NITRITE UR QL STRIP: NEGATIVE
NON-SQ EPI CELLS URNS QL MICRO: NORMAL /HPF
PH UR STRIP.AUTO: 6 [PH]
PROT UR STRIP-MCNC: NEGATIVE MG/DL
RBC #/AREA URNS AUTO: NORMAL /HPF
SP GR UR STRIP.AUTO: 1.01 (ref 1–1.03)
UROBILINOGEN UR QL STRIP.AUTO: 0.2 E.U./DL
WBC #/AREA URNS AUTO: NORMAL /HPF

## 2022-05-06 PROCEDURE — 81001 URINALYSIS AUTO W/SCOPE: CPT

## 2022-05-06 PROCEDURE — 87086 URINE CULTURE/COLONY COUNT: CPT

## 2022-05-06 NOTE — TELEPHONE ENCOUNTER
Jeffery Caldwell, [facility] [place] [facility]  UA thus far negative, culture pending

## 2022-05-06 NOTE — TELEPHONE ENCOUNTER
Called and spoke to patient's wife, Jennifer Chakraborty  Informed her patient's one urine testing is negative and will await for culture  Jennifer Chakraborty was thankful for the call and verbalized understanding

## 2022-05-07 LAB — BACTERIA UR CULT: NORMAL

## 2022-05-09 ENCOUNTER — TELEPHONE (OUTPATIENT)
Dept: INTERNAL MEDICINE CLINIC | Facility: CLINIC | Age: 52
End: 2022-05-09

## 2022-05-09 NOTE — TELEPHONE ENCOUNTER
All cardiac tests are in  Can you now clear him for left knee surgery ?      Needs to know asap as they need to reschedule the surgery

## 2022-05-11 ENCOUNTER — TELEPHONE (OUTPATIENT)
Dept: INTERNAL MEDICINE CLINIC | Facility: CLINIC | Age: 52
End: 2022-05-11

## 2022-05-11 NOTE — TELEPHONE ENCOUNTER
Pt wife calling back,    I scheduled pt for 15 minute appt with dr Angel Saravia this Friday for surg clear  Is this ok?     Pt would prefer an early morning appt or after 5 appt tomorrow    Please advise, call back

## 2022-05-12 ENCOUNTER — TELEPHONE (OUTPATIENT)
Dept: OBGYN CLINIC | Facility: HOSPITAL | Age: 52
End: 2022-05-12

## 2022-05-12 ENCOUNTER — TELEPHONE (OUTPATIENT)
Dept: OBGYN CLINIC | Facility: CLINIC | Age: 52
End: 2022-05-12

## 2022-05-12 NOTE — TELEPHONE ENCOUNTER
Dr Tana Bueno    Patient wife asked if new script is needed for bloodwork  Advised the PCP Delmy Guardado ordered prior tests  She will fu with them

## 2022-05-12 NOTE — TELEPHONE ENCOUNTER
Called wife elena , called pt  And spoke to his co-worker as his english is not good and asked if he could come at 7:30 told him no he needs to be here at 2 pm for 2:15 appt

## 2022-05-17 ENCOUNTER — TELEPHONE (OUTPATIENT)
Dept: INTERNAL MEDICINE CLINIC | Facility: CLINIC | Age: 52
End: 2022-05-17

## 2022-05-17 NOTE — TELEPHONE ENCOUNTER
I spoke to pt he is aware of his jake and that labs will be order and as pre naima he doesn't need to fast

## 2022-05-17 NOTE — TELEPHONE ENCOUNTER
Pt is seeing naima zayas for a surg clear  He is having knee surg on 06/02/22 with dr Tracey Bey       He will needs labs ordered, please advise, call back upon completion

## 2022-05-24 ENCOUNTER — CONSULT (OUTPATIENT)
Dept: INTERNAL MEDICINE CLINIC | Facility: CLINIC | Age: 52
End: 2022-05-24
Payer: COMMERCIAL

## 2022-05-24 VITALS
BODY MASS INDEX: 35.75 KG/M2 | TEMPERATURE: 98.6 F | HEIGHT: 65 IN | HEART RATE: 73 BPM | OXYGEN SATURATION: 97 % | SYSTOLIC BLOOD PRESSURE: 142 MMHG | WEIGHT: 214.6 LBS | DIASTOLIC BLOOD PRESSURE: 92 MMHG

## 2022-05-24 DIAGNOSIS — Z23 ENCOUNTER FOR IMMUNIZATION: Primary | ICD-10-CM

## 2022-05-24 PROCEDURE — 99242 OFF/OP CONSLTJ NEW/EST SF 20: CPT

## 2022-05-24 NOTE — PROGRESS NOTES
INTERNAL MEDICINE PRE-OPERATIVE EVALUATION  [facility]    NAME: Fernando Blackwood  AGE: 46 y o  SEX: male  : 1970     DATE: 2022     Internal Medicine Pre-Operative Evaluation:     Chief Complaint: Pre-operative Evaluation     Surgery: 2022  Anticipated Date of Surgery: Left knee arthroscopy  Medial menisectomy versus medial meniscus repair  Referring Provider: Mila Nash MD        History of Present Illness:     Vijaya Naranjo is a 46 y o  male who presents to the office today for a preoperative consultation at the request of surgeon, Dr Karen Greene, who plans on performing Left knee arthroscopy  Medial menisectomy versus medial meniscus repair  on 2022  Planned anesthesia is general  Patient has a bleeding risk of: no recent abnormal bleeding  Patient does have objections to receiving blood products if needed  Current anti-platelet/anti-coagulation medications that the patient is prescribed includes: No blood thinners  Assessment of Chronic Conditions:   - Hypertension: Controlled     Assessment of Cardiac Risk:  · Denies unstable or severe angina or MI in the last 6 weeks or history of stent placement in the last year   · Denies decompensated heart failure (e g  New onset heart failure, NYHA functional class IV heart failure, or worsening existing heart failure)  · Denies significant arrhythmias such as high grade AV block, symptomatic ventricular arrhythmia, newly recognized ventricular tachycardia, supraventricular tachycardia with resting heart rate >100, or symptomatic bradycardia  · Denies severe heart valve disease including aortic stenosis or symptomatic mitral stenosis     Exercise Capacity:  · Able to walk 4 blocks without symptoms?: Yes  · Able to walk 2 flights without symptoms?: Yes    Prior Anesthesia Reactions: No     Personal history of venous thromboembolic disease?  No    History of steroid use for >2 weeks within last year? No    STOP-BANG Sleep Apnea Screening Questionnaire:      Do you SNORE loudly (louder than talking or loud enough to be heard through closed doors)? No = 0 point   Do you often feel TIRED, fatigued, or sleepy during daytime? No = 0 point   Has anyone OBSERVED you stop breathing during your sleep? No = 0 point   Do you have or are you being treated for high blood pressure? Yes = 1 point   BMI more than 35 kg/m2? Yes = 1 point   AGE over 48years old? No = 0 point   NECK circumference > 16 inches (40 cm)? No = 0 point   Male GENDER?  Yes = 1 point   TOTAL SCORE 2 = LOW risk of ALLYSSA       Review of Systems:     Review of Systems     Problem List:     Patient Active Problem List   Diagnosis    Prostatic disorder    HTN (hypertension)    BPH (benign prostatic hyperplasia)    Hyperlipidemia        Allergies:     No Known Allergies     Current Medications:       Current Outpatient Medications:     amLODIPine (NORVASC) 5 mg tablet, Take 1 tablet (5 mg total) by mouth daily, Disp: 90 tablet, Rfl: 3    hydrochlorothiazide (MICROZIDE) 12 5 mg capsule, Take 12 5 mg by mouth daily, Disp: , Rfl:     lisinopril (ZESTRIL) 20 mg tablet, Take 20 mg by mouth daily, Disp: , Rfl:     metoprolol succinate (Toprol XL) 25 mg 24 hr tablet, Take 1 tablet (25 mg total) by mouth daily, Disp: 30 tablet, Rfl: 5    phenazopyridine (PYRIDIUM) 100 mg tablet, Take 100 mg by mouth 3 (three) times a day as needed for bladder spasms  , Disp: , Rfl:     sulfamethoxazole-trimethoprim (BACTRIM DS) 800-160 mg per tablet, Take 1 tablet by mouth 2 (two) times a day, Disp: , Rfl:     valsartan (DIOVAN) 320 MG tablet, Take 1 tablet (320 mg total) by mouth daily, Disp: 90 tablet, Rfl: 3    acetaminophen (TYLENOL) 325 mg tablet, Take 2 tablets (650 mg total) by mouth every 4 (four) hours as needed for mild pain (Patient not taking: No sig reported), Disp: 30 tablet, Rfl: 0    ALPRAZolam (XANAX) 0 25 mg tablet, Take 0 25 mg by mouth daily at bedtime as needed (Patient not taking: No sig reported), Disp: , Rfl:     atorvastatin (LIPITOR) 10 mg tablet, Take 10 mg by mouth daily (Patient not taking: No sig reported), Disp: , Rfl:     butalbital-acetaminophen-caffeine (FIORICET,ESGIC) -40 mg per tablet, Take 1 tablet by mouth every 4 (four) hours as needed for headaches (Patient not taking: No sig reported), Disp: 30 tablet, Rfl: 0    gabapentin (NEURONTIN) 300 mg capsule, Take 1 capsule (300 mg total) by mouth 3 (three) times a day as needed (pain) (Patient not taking: No sig reported), Disp: 15 capsule, Rfl: 0    ibuprofen (MOTRIN) 800 mg tablet, Take 800 mg by mouth every 6 (six) hours as needed (Patient not taking: No sig reported), Disp: , Rfl:     lisinopril (ZESTRIL) 30 mg tablet, Take 30 mg by mouth daily (Patient not taking: No sig reported), Disp: , Rfl:     meloxicam (Mobic) 15 mg tablet, Take 1 tablet (15 mg total) by mouth daily (Patient not taking: Reported on 5/24/2022), Disp: 30 tablet, Rfl: 0    naproxen (EC NAPROSYN) 500 MG EC tablet, Take 1 tablet (500 mg total) by mouth 2 (two) times a day as needed for mild pain (Patient not taking: No sig reported), Disp: 10 tablet, Rfl: 0    oxybutynin (DITROPAN) 5 mg tablet, Take 1 tablet (5 mg total) by mouth every 6 (six) hours as needed (bladder spasms) (Patient not taking: No sig reported), Disp: 30 tablet, Rfl: 0     Past History:     Past Medical History:   Diagnosis Date    Enlarged prostate     Hyperlipidemia     Hypertension     Neuropathy         Past Surgical History:   Procedure Laterality Date    KNEE ARTHROSCOPY Right     MN CYSTOSCOPY,DIR VIS INT URETHROTOMY N/A 10/23/2020    Procedure: DIRECT VISUAL INTERAL URETHROTOMY (DVIU);   Surgeon: Berto Chavez MD;  [facility];  Service: Urology    MN CYSTOURETHROSCOPY N/A 10/23/2020    Procedure: Raul Arenas;  Surgeon: Berto Chavez MD;  [facility];  Service: Urology    TRANSURETHRAL RESECTION OF PROSTATE          Family History   Problem Relation Age of Onset    No Known Problems Mother         Social History     Socioeconomic History    Marital status: /Civil Union     Spouse name: Not on file    Number of children: Not on file    Years of education: Not on file    Highest education level: Not on file   Occupational History    Not on file   Tobacco Use    Smoking status: Never Smoker    Smokeless tobacco: Never Used   Vaping Use    Vaping Use: Never used   Substance and Sexual Activity    Alcohol use: No    Drug use: No    Sexual activity: Not on file   Other Topics Concern    Not on file   Social History Narrative    Not on file     Social Determinants of Health     Financial Resource Strain: Not on file   Food Insecurity: Not on file   Transportation Needs: Not on file   Physical Activity: Not on file   Stress: Not on file   Social Connections: Not on file   Intimate Partner Violence: Not on file   Housing Stability: Not on file        Physical Exam:      /92 (BP Location: Left arm, Patient Position: Sitting, Cuff Size: Standard)   Pulse 73   Temp 98 6 °F (37 °C) (Temporal)   Ht 5' 5" (1 651 m)   Wt 97 3 kg (214 lb 9 6 oz)   SpO2 97%   BMI 35 71 kg/m²     Physical Exam      Data:     Pre-operative work-up    Laboratory Results: I have personally reviewed the pertinent laboratory results/reports     EKG: I have personally reviewed pertinent reports  Chest x-ray: I have personally reviewed pertinent reports  Previous cardiopulmonary studies within the past year:  · Echocardiogram: None  · Cardiac Catheterization: None  · Stress Test: Normal  · Pulmonary Function Testing: None       Assessment:     1  Encounter for immunization          Plan:     46 y o  male with planned surgery: Left knee arthroscopy  Medial menisectomy versus medial meniscus repair        Cardiac Risk Estimation: per the Revised Cardiac Risk Index (Circ  [id number], 1999), the patient's risk factors for cardiac complications include none, putting him in: RCI RISK CLASS I (0 risk factors, risk of major cardiac compl  appr  0 5%)  1  Further preoperative workup as follows:   - None; no further preoperative work-up is required    2  Medication Management/Recommendations:   - Patient has been instructed to avoid herbs or non-directed vitamins the week prior to surgery to ensure no drug interactions with perioperative surgical and anesthetic medications  - Patient should continue antihypertensive medications up through and including the day of surgery  - Patient should continue beta-blocker medication up through and including the day of surgery  - Patient has been instructed to avoid aspirin containing medications or non-steroidal anti-inflammatory drugs for the week preceding surgery  Instructed patient to STOP MELOXICAM, IBUPROFEN, AND NAPROXEN 5 DAYS PRIOR TO SURGERY    3  Prophylaxis for cardiac events with perioperative beta-blockers: not indicated  4  Patient requires further consultation with: None    Clearance  Patient is CLEARED for surgery without any additional cardiac testing       Graeme Villa [facility]  [address] [facility] [address]  Phone#  [phone]  Fax#  [phone]

## 2022-05-25 ENCOUNTER — ANESTHESIA EVENT (OUTPATIENT)
Dept: PERIOP | Facility: HOSPITAL | Age: 52
End: 2022-05-25
Payer: COMMERCIAL

## 2022-05-25 NOTE — PRE-PROCEDURE INSTRUCTIONS
Pre-Surgery Instructions:   Medication Instructions    acetaminophen (TYLENOL) 325 mg tablet Instructed to take as needed including DOS    amLODIPine (NORVASC) 5 mg tablet Instructed to take per normal schedule including DOS with sips water    metoprolol succinate (Toprol XL) 25 mg 24 hr tablet Instructed to take per normal schedule including DOS with sips water    valsartan (DIOVAN) 320 MG tablet Instructed to take per normal schedule except DOS    Pre op,medications and showering instructions reviewed-Patient has hibiclens  Instructed to avoid all ASA/NSAIDs and OTC Vit/Supp from now until after surgery per anesthesia guidelines  Tylenol ok prn  Pt  Verbalized an understanding of all instructions reviewed and offers no concerns at this time    All instructions given and questions answered  via Niuean interpretor

## 2022-06-02 ENCOUNTER — APPOINTMENT (OUTPATIENT)
Dept: RADIOLOGY | Facility: HOSPITAL | Age: 52
End: 2022-06-02
Payer: COMMERCIAL

## 2022-06-02 ENCOUNTER — ANESTHESIA (OUTPATIENT)
Dept: PERIOP | Facility: HOSPITAL | Age: 52
End: 2022-06-02
Payer: COMMERCIAL

## 2022-06-02 ENCOUNTER — HOSPITAL ENCOUNTER (OUTPATIENT)
Facility: HOSPITAL | Age: 52
Setting detail: OUTPATIENT SURGERY
Discharge: HOME | End: 2022-06-02
Attending: ORTHOPAEDIC SURGERY | Admitting: ORTHOPAEDIC SURGERY
Payer: COMMERCIAL

## 2022-06-02 VITALS
TEMPERATURE: 97 F | SYSTOLIC BLOOD PRESSURE: 144 MMHG | BODY MASS INDEX: 35.72 KG/M2 | OXYGEN SATURATION: 96 % | RESPIRATION RATE: 14 BRPM | HEIGHT: 64 IN | HEART RATE: 79 BPM | DIASTOLIC BLOOD PRESSURE: 79 MMHG | WEIGHT: 209.22 LBS

## 2022-06-02 DIAGNOSIS — S83.232D COMPLEX TEAR OF MEDIAL MENISCUS OF LEFT KNEE AS CURRENT INJURY, SUBSEQUENT ENCOUNTER: Primary | ICD-10-CM

## 2022-06-02 DIAGNOSIS — M17.12 PRIMARY OSTEOARTHRITIS OF LEFT KNEE: ICD-10-CM

## 2022-06-02 PROBLEM — E66.812 CLASS 2 OBESITY IN ADULT: Status: ACTIVE | Noted: 2022-06-02

## 2022-06-02 PROBLEM — E66.9 CLASS 2 OBESITY IN ADULT: Status: ACTIVE | Noted: 2022-06-02

## 2022-06-02 PROBLEM — S83.232A COMPLEX TEAR OF MEDIAL MENISCUS OF LEFT KNEE: Status: ACTIVE | Noted: 2022-06-02

## 2022-06-02 PROCEDURE — NC001 PR NO CHARGE: Performed by: ORTHOPAEDIC SURGERY

## 2022-06-02 PROCEDURE — 29881 ARTHRS KNE SRG MNISECTMY M/L: CPT | Performed by: ORTHOPAEDIC SURGERY

## 2022-06-02 PROCEDURE — 29881 ARTHRS KNE SRG MNISECTMY M/L: CPT

## 2022-06-02 RX ORDER — SENNOSIDES 8.6 MG
650 CAPSULE ORAL EVERY 8 HOURS PRN
Qty: 90 TABLET | Refills: 0 | Status: SHIPPED | OUTPATIENT
Start: 2022-06-02

## 2022-06-02 RX ORDER — SODIUM CHLORIDE, SODIUM LACTATE, POTASSIUM CHLORIDE, CALCIUM CHLORIDE 600; 310; 30; 20 MG/100ML; MG/100ML; MG/100ML; MG/100ML
125 INJECTION, SOLUTION INTRAVENOUS CONTINUOUS
Status: DISCONTINUED | OUTPATIENT
Start: 2022-06-02 | End: 2022-06-02 | Stop reason: HOSPADM

## 2022-06-02 RX ORDER — LIDOCAINE HYDROCHLORIDE 20 MG/ML
INJECTION, SOLUTION EPIDURAL; INFILTRATION; INTRACAUDAL; PERINEURAL AS NEEDED
Status: DISCONTINUED | OUTPATIENT
Start: 2022-06-02 | End: 2022-06-02

## 2022-06-02 RX ORDER — PHENYLEPHRINE HYDROCHLORIDE 10 MG/ML
INJECTION INTRAVENOUS AS NEEDED
Status: DISCONTINUED | OUTPATIENT
Start: 2022-06-02 | End: 2022-06-02

## 2022-06-02 RX ORDER — OXYCODONE HYDROCHLORIDE AND ACETAMINOPHEN 5; 325 MG/1; MG/1
1 TABLET ORAL EVERY 4 HOURS PRN
Status: DISCONTINUED | OUTPATIENT
Start: 2022-06-02 | End: 2022-06-02 | Stop reason: HOSPADM

## 2022-06-02 RX ORDER — ONDANSETRON 2 MG/ML
INJECTION INTRAMUSCULAR; INTRAVENOUS AS NEEDED
Status: DISCONTINUED | OUTPATIENT
Start: 2022-06-02 | End: 2022-06-02

## 2022-06-02 RX ORDER — PROPOFOL 10 MG/ML
INJECTION, EMULSION INTRAVENOUS AS NEEDED
Status: DISCONTINUED | OUTPATIENT
Start: 2022-06-02 | End: 2022-06-02

## 2022-06-02 RX ORDER — MAGNESIUM HYDROXIDE 1200 MG/15ML
LIQUID ORAL AS NEEDED
Status: DISCONTINUED | OUTPATIENT
Start: 2022-06-02 | End: 2022-06-02 | Stop reason: HOSPADM

## 2022-06-02 RX ORDER — FENTANYL CITRATE 50 UG/ML
INJECTION, SOLUTION INTRAMUSCULAR; INTRAVENOUS AS NEEDED
Status: DISCONTINUED | OUTPATIENT
Start: 2022-06-02 | End: 2022-06-02

## 2022-06-02 RX ORDER — BUPIVACAINE HYDROCHLORIDE 5 MG/ML
INJECTION, SOLUTION EPIDURAL; INTRACAUDAL AS NEEDED
Status: DISCONTINUED | OUTPATIENT
Start: 2022-06-02 | End: 2022-06-02 | Stop reason: HOSPADM

## 2022-06-02 RX ORDER — MIDAZOLAM HYDROCHLORIDE 2 MG/2ML
INJECTION, SOLUTION INTRAMUSCULAR; INTRAVENOUS AS NEEDED
Status: DISCONTINUED | OUTPATIENT
Start: 2022-06-02 | End: 2022-06-02

## 2022-06-02 RX ORDER — DEXAMETHASONE SODIUM PHOSPHATE 10 MG/ML
INJECTION, SOLUTION INTRAMUSCULAR; INTRAVENOUS AS NEEDED
Status: DISCONTINUED | OUTPATIENT
Start: 2022-06-02 | End: 2022-06-02

## 2022-06-02 RX ORDER — ONDANSETRON 2 MG/ML
4 INJECTION INTRAMUSCULAR; INTRAVENOUS ONCE AS NEEDED
Status: DISCONTINUED | OUTPATIENT
Start: 2022-06-02 | End: 2022-06-02 | Stop reason: HOSPADM

## 2022-06-02 RX ORDER — CELECOXIB 100 MG/1
100 CAPSULE ORAL 2 TIMES DAILY
Qty: 30 CAPSULE | Refills: 0 | Status: SHIPPED | OUTPATIENT
Start: 2022-06-02

## 2022-06-02 RX ORDER — CEFAZOLIN SODIUM 2 G/50ML
2000 SOLUTION INTRAVENOUS ONCE
Status: COMPLETED | OUTPATIENT
Start: 2022-06-02 | End: 2022-06-02

## 2022-06-02 RX ORDER — CEPHALEXIN 500 MG/1
500 CAPSULE ORAL 4 TIMES DAILY
Qty: 4 CAPSULE | Refills: 0 | Status: SHIPPED | OUTPATIENT
Start: 2022-06-02 | End: 2022-06-03

## 2022-06-02 RX ORDER — SODIUM CHLORIDE, SODIUM LACTATE, POTASSIUM CHLORIDE, CALCIUM CHLORIDE 600; 310; 30; 20 MG/100ML; MG/100ML; MG/100ML; MG/100ML
100 INJECTION, SOLUTION INTRAVENOUS CONTINUOUS
Status: DISCONTINUED | OUTPATIENT
Start: 2022-06-02 | End: 2022-06-02 | Stop reason: HOSPADM

## 2022-06-02 RX ORDER — FENTANYL CITRATE/PF 50 MCG/ML
50 SYRINGE (ML) INJECTION
Status: DISCONTINUED | OUTPATIENT
Start: 2022-06-02 | End: 2022-06-02 | Stop reason: HOSPADM

## 2022-06-02 RX ORDER — OXYCODONE HYDROCHLORIDE 5 MG/1
5 TABLET ORAL EVERY 6 HOURS PRN
Qty: 14 TABLET | Refills: 0 | Status: SHIPPED | OUTPATIENT
Start: 2022-06-02 | End: 2022-06-07

## 2022-06-02 RX ORDER — METHYLPREDNISOLONE ACETATE 80 MG/ML
INJECTION, SUSPENSION INTRA-ARTICULAR; INTRALESIONAL; INTRAMUSCULAR; SOFT TISSUE AS NEEDED
Status: DISCONTINUED | OUTPATIENT
Start: 2022-06-02 | End: 2022-06-02 | Stop reason: HOSPADM

## 2022-06-02 RX ADMIN — DEXAMETHASONE SODIUM PHOSPHATE 10 MG: 10 INJECTION, SOLUTION INTRAMUSCULAR; INTRAVENOUS at 08:03

## 2022-06-02 RX ADMIN — PHENYLEPHRINE HYDROCHLORIDE 100 MCG: 10 INJECTION INTRAVENOUS at 08:19

## 2022-06-02 RX ADMIN — FENTANYL CITRATE 25 MCG: 50 INJECTION, SOLUTION INTRAMUSCULAR; INTRAVENOUS at 08:08

## 2022-06-02 RX ADMIN — MIDAZOLAM 2 MG: 1 INJECTION INTRAMUSCULAR; INTRAVENOUS at 07:55

## 2022-06-02 RX ADMIN — PROPOFOL 200 MG: 10 INJECTION, EMULSION INTRAVENOUS at 08:02

## 2022-06-02 RX ADMIN — FENTANYL CITRATE 25 MCG: 50 INJECTION, SOLUTION INTRAMUSCULAR; INTRAVENOUS at 08:02

## 2022-06-02 RX ADMIN — PHENYLEPHRINE HYDROCHLORIDE 100 MCG: 10 INJECTION INTRAVENOUS at 08:44

## 2022-06-02 RX ADMIN — OXYCODONE HYDROCHLORIDE AND ACETAMINOPHEN 1 TABLET: 5; 325 TABLET ORAL at 11:49

## 2022-06-02 RX ADMIN — PHENYLEPHRINE HYDROCHLORIDE 100 MCG: 10 INJECTION INTRAVENOUS at 08:22

## 2022-06-02 RX ADMIN — PHENYLEPHRINE HYDROCHLORIDE 100 MCG: 10 INJECTION INTRAVENOUS at 08:11

## 2022-06-02 RX ADMIN — PHENYLEPHRINE HYDROCHLORIDE 100 MCG: 10 INJECTION INTRAVENOUS at 08:37

## 2022-06-02 RX ADMIN — SODIUM CHLORIDE, SODIUM LACTATE, POTASSIUM CHLORIDE, AND CALCIUM CHLORIDE 125 ML/HR: .6; .31; .03; .02 INJECTION, SOLUTION INTRAVENOUS at 07:44

## 2022-06-02 RX ADMIN — PHENYLEPHRINE HYDROCHLORIDE 100 MCG: 10 INJECTION INTRAVENOUS at 08:26

## 2022-06-02 RX ADMIN — FENTANYL CITRATE 50 MCG: 50 INJECTION, SOLUTION INTRAMUSCULAR; INTRAVENOUS at 09:58

## 2022-06-02 RX ADMIN — CEFAZOLIN SODIUM 2000 MG: 2 SOLUTION INTRAVENOUS at 07:50

## 2022-06-02 RX ADMIN — SODIUM CHLORIDE, SODIUM LACTATE, POTASSIUM CHLORIDE, AND CALCIUM CHLORIDE: .6; .31; .03; .02 INJECTION, SOLUTION INTRAVENOUS at 08:45

## 2022-06-02 RX ADMIN — FENTANYL CITRATE 50 MCG: 50 INJECTION, SOLUTION INTRAMUSCULAR; INTRAVENOUS at 09:11

## 2022-06-02 RX ADMIN — PHENYLEPHRINE HYDROCHLORIDE 100 MCG: 10 INJECTION INTRAVENOUS at 08:16

## 2022-06-02 RX ADMIN — ONDANSETRON 4 MG: 2 INJECTION INTRAMUSCULAR; INTRAVENOUS at 08:03

## 2022-06-02 RX ADMIN — PHENYLEPHRINE HYDROCHLORIDE 20 MCG/MIN: 10 INJECTION INTRAVENOUS at 08:23

## 2022-06-02 RX ADMIN — LIDOCAINE HYDROCHLORIDE 100 MG: 20 INJECTION, SOLUTION EPIDURAL; INFILTRATION; INTRACAUDAL at 08:02

## 2022-06-02 RX ADMIN — PHENYLEPHRINE HYDROCHLORIDE 100 MCG: 10 INJECTION INTRAVENOUS at 08:30

## 2022-06-02 NOTE — ANESTHESIA PREPROCEDURE EVALUATION
Procedure:  Left knee arthroscopy  Medial menisectomy versus medial meniscus repair  (Left Knee)    Relevant Problems   ANESTHESIA (within normal limits)      CARDIO   (+) HTN (hypertension)   (+) Hyperlipidemia      /RENAL   (+) BPH (benign prostatic hyperplasia)   (+) Prostatic disorder      PULMONARY (within normal limits)        Physical Exam    Airway    Mallampati score: III  TM Distance: >3 FB  Neck ROM: full     Dental   No notable dental hx     Cardiovascular  Rhythm: regular, Rate: normal,     Pulmonary  Breath sounds clear to auscultation,     Other Findings        Anesthesia Plan  ASA Score- 2     Anesthesia Type- general with ASA Monitors  Additional Monitors:   Airway Plan: LMA  Plan Factors-Exercise tolerance (METS): >4 METS  Chart reviewed  Existing labs reviewed  Patient summary reviewed  Patient is not a current smoker  Induction- intravenous  Postoperative Plan- Plan for postoperative opioid use  Planned trial extubation    Informed Consent- Anesthetic plan and risks discussed with patient  I personally reviewed this patient with the CRNA  Discussed and agreed on the Anesthesia Plan with the CRNA  Cristian Kim

## 2022-06-02 NOTE — DISCHARGE INSTRUCTIONS
Postoperative Instructions For Knee Arthroscopy       Wound care  Please leave bandage on for 48 hours  You may remove the bandage after that time and apply Band-Aids  You are allowed to shower after two days  Only have warm soapy water run over the incisions  No scrubbing  No hot tubs  No soaking  No lotions or creams of any sort  After your shower pat dry and place a Band-Aid over the incision  Physical therapy  No physical therapy will be started at this point  We will discuss this at your 1st postoperative follow-up  Weight-bearing status  Patient is allowed to weight -bear as tolerated  Use crutches for support as needed  Medications  You may start taking your home medications as regularly directed  Narcotic medication will be prescribed for you  This is to be used as needed  Do not drive while taking narcotic medications  Tried to reduce use of narcotic pain medication as soon as comfort allows  If the pain does not require narcotic medication, take Tylenol or Celebrex to help  Emergency instructions  Give the office a call at [phone] if you experience any of the following:  Fever greater than 101 5  Increase redness around the incisions  Increase drainage around the incisions  Increased pain    Follow-up  A follow-up appointment should have been made while scheduling surgery  If one was not scheduled, please call [phone] and schedule an appointment in 2 weeks with Dr Maurice Goddard

## 2022-06-02 NOTE — OP NOTE
OPERATIVE REPORT    Patient Name: Garcia Watson    :  1970  MRN: [id number]  Pt Location: [facility] OR ROOM 03    Surgery Date:   2022    Surgeon and Assistant:   Philip Peralta MD - Primary   Aaron Last PA-C - Assisting    Preop Diagnosis:  Complex tear of medial meniscus of left knee  Osteoarthritis left knee      Post-Op Diagnosis:  Complex tear of medial meniscus of left knee  Osteoarthritis left knee    Procedure(s) (LRB):  Left knee arthroscopy with Partial Medial menisectomy and Chondroplasty Left [address] [facility]    Specimen(s):  None    Estimated Blood Loss:   Minimal    Drains:  None    Implants:   None    Anesthesia Type:   General    Operative Indications:  45 y/o s/p work injury  Left knee medial meniscus tear and osteoarthritis left knee  Symptoms did not improve with greater than 6 months conservative care  MRI confirmed medial meniscus tear,  Risks and benefits of knee arthroscopy and medial meniscus repair or partial meniscectomy discussed in detail and patient wishes to proceed  Also discussed chondroplasty risks and benefits if necessary  Operative Findings:  Complex tear of medial meniscus with primary radial component  Not repairable  Grade three chondral lesions patella  Grade three to four chondral lesions MFC    Complications:   None    Procedure and Technique:  Patient seen and examined in the preoperative area  Risks and benefits of procedure discussed in detail  Patient wished to proceed  He received prophylactic IV Ancef  He was taken to the operating room and placed on the OR table in supine position  General anesthesia administered  Tourniquet placed on the left thigh but not inflated  Left knee examined  There was full range of motion  Knee stable to varus and valgus stress testing  Negative Lachman's  Negative posterior drawer  Left thigh knee and leg prepped and draped in the usual sterile fashion  Time-out performed    Anterior lateral portal established and scope introduced  Diagnostic arthroscopy performed  Normal suprapatellar pouch  There was some grade 2 and 3 changes of the lateral facet of the patella  Through the was in good condition  Medial gutter normal   Arthroscope introduced into the medial compartment  Anterior medial portal established under direct visualization  There was a 1 5 cm diameter area grade 3 changes of the weight-bearing area of the medial femoral condyle  A medial meniscal tear was noted at the juncture of the body and posterior horn  Prior to evaluating this further the notch and lateral compartment were evaluated  ACL and PCL wee intact  In the lateral compartment the meniscus was intact to probing  The lateral femoral condyle and lateral tibial plateau articular cartilage was in good condition  We then further evaluated the medial meniscus  The meniscus anterior to the tear was in good condition  However the meniscal tissue posterior to the tear and into the medial aspect of the posterior horn was very degenerative  The shaver was utilized to debride the degenerative tissue  Once debridement was performed it was evident that this complex but primarily radial tear could not be repaired  The meniscus was then trimmed and balanced  A biter and a shaver were utilized to perform this  The meniscus was then stable to probing  A shaver was then utilized to debride the loose cartilage of the lesion noted in the medial femoral condyle  Also some of the great 3 loose cartilage was debrided from the lateral facet of patella  Knee was then irrigated and drained  The injected with 80 mg Depo-Medrol and 0 5% Marcaine  Portals closed with nylon stitches  Dry sterile dressing applied  Patient tolerated the procedure well  No complications  He went to recovery stable condition  No resident available for surgery  LIBORIO Singh assisted throughout the case with knee postiion and exposure      I was present for the entire procedure    Patient Disposition:  PACU     SIGNATURE: Sujata Alejo MD  DATE: June 2, 2022  TIME: 9:28 AM      Portions of the record may have been created with voice recognition software   Occasional wrong word or "sound a like" substitutions may have occurred due to the inherent limitations of voice recognition software   Read the chart carefully and recognize, using context, where substitutions have occurred

## 2022-06-02 NOTE — H&P
HPI:  Patient is a 46y o  year old male who presents with chief complaint of knee pain  Patient was seen in the emergency department on 11/17/2021  He states that he was working in a crawl space when he felt a pop the medial aspect of his left knee and had immediate pain  Patient was unable ambulate after the injury  He states that is painful  X-rays were performed in the emergency room  He was provided naproxen for pain  MRI of the left knee was ordered to evaluate for meniscal tear  He states he continues to have pain over the medial aspect of the knee  He has noted clicking and locking occasionally         ROS:   General: No fever, no chills, no weight loss, no weight gain  HEENT:  No loss of hearing, no nose bleeds, no sore throat  Eyes:  No eye pain, no red eyes, no visual disturbance  Respiratory:  No cough, no shortness of breath, no wheezing  Cardiovascular:  No chest pain, no palpitations, no edema  GI: No abdominal pain, no nausea, no vomiting  Endocrine: No frequent urination, no excessive thirst  Urinary:  No dysuria, no hematuria, no incontinence  Musculoskeletal: see HPI and PE  Skin:  No rash, no wounds  Neurological:  No dizziness, no headache, no numbness  Psychiatric:  No difficulty concentrating, no depression, no suicide thoughts, no anxiety  Review of all other systems is negative    PMH:  Past Medical History:   Diagnosis Date    Enlarged prostate     Hyperlipidemia     Hypertension     Neuropathy        PSH:  Past Surgical History:   Procedure Laterality Date    KNEE ARTHROSCOPY Right     NV CYSTOSCOPY,DIR VIS INT URETHROTOMY N/A 10/23/2020    Procedure: DIRECT VISUAL INTERAL URETHROTOMY (DVIU);   Surgeon: Shira Guardado MD;  [facility];  Service: Urology    NV CYSTOURETHROSCOPY N/A 10/23/2020    Procedure: Cindy Staley;  Surgeon: Shira Guardado MD;  [facility];  Service: Urology    TRANSURETHRAL RESECTION OF PROSTATE Medications:  Current Facility-Administered Medications   Medication Dose Route Frequency Provider Last Rate Last Admin    lactated ringers infusion  125 mL/hr Intravenous Continuous Talia Pickens MD           Allergies:  No Known Allergies    Family History:  Family History   Problem Relation Age of Onset    No Known Problems Mother        Social History:  Social History     Occupational History    Not on file   Tobacco Use    Smoking status: Never Smoker    Smokeless tobacco: Never Used   Vaping Use    Vaping Use: Never used   Substance and Sexual Activity    Alcohol use: No    Drug use: No    Sexual activity: Not on file       Physical Exam:  General :  Alert, cooperative, no distress, appears stated age  Blood pressure 156/97, pulse 67, temperature (!) 97 2 °F (36 2 °C), temperature source Temporal, resp  rate 13, height 5' 4" (1 626 m), weight 94 9 kg (209 lb 3 5 oz), SpO2 98 %  Head:  Normocephalic, without obvious abnormality, atraumatic   Eyes:  Conjunctiva/corneas clear, EOM's intact,   Ears: Both ears normal appearance, no hearing deficits      Nose: Nares normal, septum midline, no drainage    Neck: Supple,  trachea midline, no adenopathy, no tenderness, no mass   Back:   Symmetric, no curvature, ROM normal, no tenderness   Lungs:   Respirations unlabored   Chest Wall:  No tenderness or deformity   Extremities: Extremities normal, atraumatic, no cyanosis or edema      Pulses: 2+ and symmetric   Skin: Skin color, texture, turgor normal, no rashes or lesions      Neurologic: Normal   Heart:  Lungs: Regular rate and rhythm  Clear to auscultation       Ortho Exam  Left knee  Tenderness to palpation over the medial joint line, minimal anterior, lateral tenderness, no tenderness over the abductor tubercle or MCL  He has normal range of motion with knee flexion and extension  Negative Nickie's test, knee is stable to valgus and varus test, negative Lachman's test, negative anterior drawer, negative posterior drawer  No erythema present, mild swelling noted  Small effusion appreciated, positive Apley grind test    Imaging Studies: The following imaging studies were reviewed in office today  My findings are noted  MRI of the left knee demonstrated a large complex tear of the medial meniscus posterior horn and body    Assessment/Plan  Left knee medial meniscus tear  · MRI was reviewed with the patient which demonstrated a medial meniscus tear  · He was provided with a cortisone injection however this provided minimal relief  · He states that he would like to proceed with surgical intervention at this time  · Risks and benefits to surgery were discussed with the patient that include but not limited to infection, DVT, damage to arteries, nerves, vessels, tendons, ligaments, possible increase in osteoarthritis, significant blood loss secondly to shocking possible death  Patient understands the risks and wishes to proceed  All questions were answered to his satisfaction  · Consent was obtained for left knee arthroscopy meniscectomy or meniscal repair and associated procedures    · Patient will follow up 2 weeks after his surgery for suture removal and evaluation

## 2022-06-02 NOTE — ANESTHESIA POSTPROCEDURE EVALUATION
Post-Op Assessment Note    CV Status:  Stable  Pain Score: 0    Pain management: adequate     Mental Status:  Awake   Hydration Status:  Stable   PONV Controlled:  Controlled   Airway Patency:  Patent      Post Op Vitals Reviewed: Yes      Staff: CRNA         No complications documented      /81 (06/02/22 0915)    Temp 97 5 °F (36 4 °C) (06/02/22 0915)    Pulse 72 (06/02/22 0915)   Resp 16 (06/02/22 0915)    SpO2 100 % (06/02/22 0915)

## 2022-06-10 ENCOUNTER — TELEPHONE (OUTPATIENT)
Dept: OBGYN CLINIC | Facility: HOSPITAL | Age: 52
End: 2022-06-10

## 2022-06-10 NOTE — TELEPHONE ENCOUNTER
Hello,    Please advise if the following patient can be forced onto the schedule:    Patient: Sonia Gonzales    : 70    MRN: [id number]    Call back #: [phone]      Reason for appointment: R hip sx  needs a PO appt, first available currently is   Patient needs early am slot    Requested doctor/location: [facility]      Thank you      Email sent to JACQUELIN OJEDA

## 2022-06-15 ENCOUNTER — OFFICE VISIT (OUTPATIENT)
Dept: OBGYN CLINIC | Facility: CLINIC | Age: 52
End: 2022-06-15

## 2022-06-15 VITALS
SYSTOLIC BLOOD PRESSURE: 141 MMHG | WEIGHT: 212 LBS | DIASTOLIC BLOOD PRESSURE: 89 MMHG | HEART RATE: 82 BPM | RESPIRATION RATE: 18 BRPM | BODY MASS INDEX: 36.19 KG/M2 | HEIGHT: 64 IN

## 2022-06-15 DIAGNOSIS — M17.12 PRIMARY OSTEOARTHRITIS OF LEFT KNEE: ICD-10-CM

## 2022-06-15 DIAGNOSIS — S83.232D COMPLEX TEAR OF MEDIAL MENISCUS OF LEFT KNEE AS CURRENT INJURY, SUBSEQUENT ENCOUNTER: Primary | ICD-10-CM

## 2022-06-15 PROCEDURE — 99024 POSTOP FOLLOW-UP VISIT: CPT | Performed by: ORTHOPAEDIC SURGERY

## 2022-06-15 NOTE — LETTER
Rufina 15, 2022     Patient: Ladi Roth  YOB: 1970  Date of Visit: 6/15/2022      To Whom it May Concern:    Ladi Roth is under my professional care  Catracho Encinas was seen in my office on 6/15/2022  Catracho Encinas will be off of work for 2 weeks  He can return to work on 6/29/2022 without restrictions  If you have any questions or concerns, please don't hesitate to call           Sincerely,          June Farris MD        CC: No Recipients

## 2022-06-15 NOTE — PROGRESS NOTES
HPI:  Patient is a 46y o  year old male who presents for follow-up regarding s/p left knee arthroscopy with partial medial meniscectomy and chondroplasty left medial femoral condyle  Patient states that he still has some pain over-medial aspect of his knee  He states that he has been taking some Tylenol and anti-inflammatories as needed for pain  He denies any numbness or tingling  ROS:   General: No fever, no chills, no weight loss, no weight gain  HEENT:  No loss of hearing, no nose bleeds, no sore throat  Eyes:  No eye pain, no red eyes, no visual disturbance  Respiratory:  No cough, no shortness of breath, no wheezing  Cardiovascular:  No chest pain, no palpitations, no edema  GI: No abdominal pain, no nausea, no vomiting  Endocrine: No frequent urination, no excessive thirst  Urinary:  No dysuria, no hematuria, no incontinence  Musculoskeletal: see HPI and PE  Skin:  No rash, no wounds  Neurological:  No dizziness, no headache, no numbness  Psychiatric:  No difficulty concentrating, no depression, no suicide thoughts, no anxiety  Review of all other systems is negative    PMH:  Past Medical History:   Diagnosis Date    Enlarged prostate     Hyperlipidemia     Hypertension     Neuropathy        PSH:  Past Surgical History:   Procedure Laterality Date    KNEE ARTHROSCOPY Right     AR CYSTOSCOPY,DIR VIS INT URETHROTOMY N/A 10/23/2020    Procedure: DIRECT VISUAL INTERAL URETHROTOMY (DVIU); Surgeon: Adonis Silva MD;  [facility];  Service: Urology    AR CYSTOURETHROSCOPY N/A 10/23/2020    Procedure: Trang Phillips;  Surgeon: Adonis Silva MD;  [facility];  Service: Urology    [facility] Left 6/2/2022    Procedure: Left knee arthroscopy  partial Medial menisectomy   Condroplasty ;  Surgeon: Aretha Justin MD;  [facility];  Service: Orthopedics    TRANSURETHRAL RESECTION OF PROSTATE         Medications:  Current Outpatient Medications Medication Sig Dispense Refill    acetaminophen (TYLENOL) 650 mg CR tablet Take 1 tablet (650 mg total) by mouth every 8 (eight) hours as needed for mild pain 90 tablet 0    amLODIPine (NORVASC) 5 mg tablet Take 1 tablet (5 mg total) by mouth daily 90 tablet 3    celecoxib (CeleBREX) 100 mg capsule Take 1 capsule (100 mg total) by mouth 2 (two) times a day 30 capsule 0    hydrochlorothiazide (MICROZIDE) 12 5 mg capsule Take 12 5 mg by mouth daily      metoprolol succinate (Toprol XL) 25 mg 24 hr tablet Take 1 tablet (25 mg total) by mouth daily 30 tablet 5    phenazopyridine (PYRIDIUM) 100 mg tablet Take 100 mg by mouth 3 (three) times a day as needed for bladder spasms        valsartan (DIOVAN) 320 MG tablet Take 1 tablet (320 mg total) by mouth daily 90 tablet 3     No current facility-administered medications for this visit  Allergies:  No Known Allergies    Family History:  Family History   Problem Relation Age of Onset    No Known Problems Mother     No Known Problems Father        Social History:  Social History     Occupational History    Not on file   Tobacco Use    Smoking status: Never Smoker    Smokeless tobacco: Never Used   Vaping Use    Vaping Use: Never used   Substance and Sexual Activity    Alcohol use: No    Drug use: No    Sexual activity: Yes     Partners: Female       Physical Exam:  General :  Alert, cooperative, no distress, appears stated age  Blood pressure 141/89, pulse 82, resp  rate 18, height 5' 4" (1 626 m), weight 96 2 kg (212 lb)  Head:  Normocephalic, without obvious abnormality, atraumatic   Eyes:  Conjunctiva/corneas clear, EOM's intact,   Ears: Both ears normal appearance, no hearing deficits      Nose: Nares normal, septum midline, no drainage    Neck: Supple,  trachea midline, no adenopathy, no tenderness, no mass   Back:   Symmetric, no curvature, ROM normal, no tenderness   Lungs:   Respirations unlabored   Chest Wall:  No tenderness or deformity Extremities: Extremities normal, atraumatic, no cyanosis or edema      Pulses: 2+ and symmetric   Skin: Skin color, texture, turgor normal, no rashes or lesions      Neurologic: Normal           Ortho Exam    Right knee  No erythema edema or ecchymosis noted, skin is warm to touch   Incisions are healing well with no signs or symptoms of infection, sutures were removed today without complication  Range of motion 0-110 degrees  Strength is 5/5 for flexion extension  Knee is stable with ligamentous testing   Patient is neurovascular intact    Imaging Studies: The following imaging studies were reviewed in office today  My findings are noted  No additional studies were ordered at today's visit    Assessment  Encounter Diagnoses   Name Primary?  Complex tear of medial meniscus of left knee as current injury, subsequent encounter Yes    Primary osteoarthritis of left knee          Plan:  2 weeks s/p left knee arthroscopy with partial medial meniscectomy and chondroplasty [facility]  · Patient was advised that he can have continued pain after surgery as he had a partial medial meniscectomy and chondroplasty  Not able to do meniscal repair    · He was advised to take Tylenol and anti-inflammatories as needed for pain   · He was offered a physical therapy script however declined at this time   · He was given a work note that he is off of work for 2 weeks and can return to work without restrictions on 06/29/2022   · He will follow up with us as needed    Scribe Attestation    I,:  Allison Rollins PA-C am acting as a scribe while in the presence of the attending physician :       I,:  Debra Manley MD personally performed the services described in this documentation    as scribed in my presence :

## 2022-06-20 ENCOUNTER — TELEPHONE (OUTPATIENT)
Dept: UROLOGY | Facility: CLINIC | Age: 52
End: 2022-06-20

## 2022-06-20 NOTE — TELEPHONE ENCOUNTER
Patient no showed for cystoscopy today  If he is still having urinary symptoms, he can be scheduled with the [facility] physician in [facility]

## 2022-06-22 NOTE — TELEPHONE ENCOUNTER
Called pt but he was driving - he said he will call back later - when he does please ask him if he would like to continue care with us - if so he can be scheduled for cysto in [facility] next available and placed on wait list

## 2022-07-10 ENCOUNTER — APPOINTMENT (EMERGENCY)
Dept: CT IMAGING | Facility: HOSPITAL | Age: 52
End: 2022-07-10
Payer: COMMERCIAL

## 2022-07-10 ENCOUNTER — HOSPITAL ENCOUNTER (OUTPATIENT)
Facility: HOSPITAL | Age: 52
Setting detail: OBSERVATION
Discharge: HOME | End: 2022-07-11
Attending: EMERGENCY MEDICINE | Admitting: INTERNAL MEDICINE
Payer: COMMERCIAL

## 2022-07-10 DIAGNOSIS — R30.0 DYSURIA: ICD-10-CM

## 2022-07-10 DIAGNOSIS — N39.0 URINARY TRACT INFECTION: ICD-10-CM

## 2022-07-10 DIAGNOSIS — A41.9 SEPSIS (HCC): Primary | ICD-10-CM

## 2022-07-10 DIAGNOSIS — N40.0 BENIGN PROSTATIC HYPERPLASIA, UNSPECIFIED WHETHER LOWER URINARY TRACT SYMPTOMS PRESENT: ICD-10-CM

## 2022-07-10 LAB
ALBUMIN SERPL BCP-MCNC: 4 G/DL (ref 3.5–5)
ALP SERPL-CCNC: 100 U/L (ref 46–116)
ALT SERPL W P-5'-P-CCNC: 46 U/L (ref 12–78)
ANION GAP SERPL CALCULATED.3IONS-SCNC: 10 MMOL/L (ref 4–13)
APTT PPP: 32 SECONDS (ref 23–37)
AST SERPL W P-5'-P-CCNC: 19 U/L (ref 5–45)
BACTERIA UR QL AUTO: ABNORMAL /HPF
BASOPHILS # BLD AUTO: 0.08 THOUSANDS/ΜL (ref 0–0.1)
BASOPHILS NFR BLD AUTO: 0 % (ref 0–1)
BILIRUB SERPL-MCNC: 0.89 MG/DL (ref 0.2–1)
BILIRUB UR QL STRIP: NEGATIVE
BUN SERPL-MCNC: 13 MG/DL (ref 5–25)
CALCIUM SERPL-MCNC: 8.8 MG/DL (ref 8.3–10.1)
CHLORIDE SERPL-SCNC: 98 MMOL/L (ref 100–108)
CLARITY UR: ABNORMAL
CO2 SERPL-SCNC: 27 MMOL/L (ref 21–32)
COLOR UR: YELLOW
CREAT SERPL-MCNC: 1.08 MG/DL (ref 0.6–1.3)
EOSINOPHIL # BLD AUTO: 0 THOUSAND/ΜL (ref 0–0.61)
EOSINOPHIL NFR BLD AUTO: 0 % (ref 0–6)
ERYTHROCYTE [DISTWIDTH] IN BLOOD BY AUTOMATED COUNT: 13 % (ref 11.6–15.1)
FLUAV RNA RESP QL NAA+PROBE: NEGATIVE
FLUBV RNA RESP QL NAA+PROBE: NEGATIVE
GFR SERPL CREATININE-BSD FRML MDRD: 78 ML/MIN/1.73SQ M
GLUCOSE SERPL-MCNC: 110 MG/DL (ref 65–140)
GLUCOSE UR STRIP-MCNC: NEGATIVE MG/DL
HCT VFR BLD AUTO: 43.2 % (ref 36.5–49.3)
HGB BLD-MCNC: 15.2 G/DL (ref 12–17)
HGB UR QL STRIP.AUTO: ABNORMAL
IMM GRANULOCYTES # BLD AUTO: 0.11 THOUSAND/UL (ref 0–0.2)
IMM GRANULOCYTES NFR BLD AUTO: 1 % (ref 0–2)
INR PPP: 1.03 (ref 0.84–1.19)
KETONES UR STRIP-MCNC: NEGATIVE MG/DL
LACTATE SERPL-SCNC: 0.9 MMOL/L (ref 0.5–2)
LEUKOCYTE ESTERASE UR QL STRIP: ABNORMAL
LYMPHOCYTES # BLD AUTO: 1.8 THOUSANDS/ΜL (ref 0.6–4.47)
LYMPHOCYTES NFR BLD AUTO: 9 % (ref 14–44)
MCH RBC QN AUTO: 29.9 PG (ref 26.8–34.3)
MCHC RBC AUTO-ENTMCNC: 35.2 G/DL (ref 31.4–37.4)
MCV RBC AUTO: 85 FL (ref 82–98)
MONOCYTES # BLD AUTO: 1.48 THOUSAND/ΜL (ref 0.17–1.22)
MONOCYTES NFR BLD AUTO: 7 % (ref 4–12)
MUCOUS THREADS UR QL AUTO: ABNORMAL
NEUTROPHILS # BLD AUTO: 17.6 THOUSANDS/ΜL (ref 1.85–7.62)
NEUTS SEG NFR BLD AUTO: 83 % (ref 43–75)
NITRITE UR QL STRIP: POSITIVE
NON-SQ EPI CELLS URNS QL MICRO: ABNORMAL /HPF
NRBC BLD AUTO-RTO: 0 /100 WBCS
PH UR STRIP.AUTO: 6 [PH]
PLATELET # BLD AUTO: 195 THOUSANDS/UL (ref 149–390)
PMV BLD AUTO: 10.6 FL (ref 8.9–12.7)
POTASSIUM SERPL-SCNC: 3.5 MMOL/L (ref 3.5–5.3)
PROCALCITONIN SERPL-MCNC: 0.2 NG/ML
PROT SERPL-MCNC: 7.7 G/DL (ref 6.4–8.2)
PROT UR STRIP-MCNC: ABNORMAL MG/DL
PROTHROMBIN TIME: 13 SECONDS (ref 11.6–14.5)
RBC # BLD AUTO: 5.09 MILLION/UL (ref 3.88–5.62)
RBC #/AREA URNS AUTO: ABNORMAL /HPF
RSV RNA RESP QL NAA+PROBE: NEGATIVE
SARS-COV-2 RNA RESP QL NAA+PROBE: NEGATIVE
SODIUM SERPL-SCNC: 135 MMOL/L (ref 136–145)
SP GR UR STRIP.AUTO: 1.02 (ref 1–1.03)
UROBILINOGEN UR QL STRIP.AUTO: 0.2 E.U./DL
WBC # BLD AUTO: 21.07 THOUSAND/UL (ref 4.31–10.16)
WBC #/AREA URNS AUTO: ABNORMAL /HPF

## 2022-07-10 PROCEDURE — 87086 URINE CULTURE/COLONY COUNT: CPT | Performed by: EMERGENCY MEDICINE

## 2022-07-10 PROCEDURE — 87077 CULTURE AEROBIC IDENTIFY: CPT | Performed by: EMERGENCY MEDICINE

## 2022-07-10 PROCEDURE — 80053 COMPREHEN METABOLIC PANEL: CPT | Performed by: PHYSICIAN ASSISTANT

## 2022-07-10 PROCEDURE — 84145 PROCALCITONIN (PCT): CPT | Performed by: PHYSICIAN ASSISTANT

## 2022-07-10 PROCEDURE — 0241U HB NFCT DS VIR RESP RNA 4 TRGT: CPT | Performed by: PHYSICIAN ASSISTANT

## 2022-07-10 PROCEDURE — 85025 COMPLETE CBC W/AUTO DIFF WBC: CPT | Performed by: PHYSICIAN ASSISTANT

## 2022-07-10 PROCEDURE — 99220 PR INITIAL OBSERVATION CARE/DAY 70 MINUTES: CPT | Performed by: INTERNAL MEDICINE

## 2022-07-10 PROCEDURE — 85730 THROMBOPLASTIN TIME PARTIAL: CPT | Performed by: PHYSICIAN ASSISTANT

## 2022-07-10 PROCEDURE — 99285 EMERGENCY DEPT VISIT HI MDM: CPT | Performed by: PHYSICIAN ASSISTANT

## 2022-07-10 PROCEDURE — 36415 COLL VENOUS BLD VENIPUNCTURE: CPT | Performed by: PHYSICIAN ASSISTANT

## 2022-07-10 PROCEDURE — 96375 TX/PRO/DX INJ NEW DRUG ADDON: CPT

## 2022-07-10 PROCEDURE — 87186 SC STD MICRODIL/AGAR DIL: CPT | Performed by: EMERGENCY MEDICINE

## 2022-07-10 PROCEDURE — 96365 THER/PROPH/DIAG IV INF INIT: CPT

## 2022-07-10 PROCEDURE — 74176 CT ABD & PELVIS W/O CONTRAST: CPT

## 2022-07-10 PROCEDURE — 81001 URINALYSIS AUTO W/SCOPE: CPT | Performed by: EMERGENCY MEDICINE

## 2022-07-10 PROCEDURE — 96361 HYDRATE IV INFUSION ADD-ON: CPT

## 2022-07-10 PROCEDURE — 85610 PROTHROMBIN TIME: CPT | Performed by: PHYSICIAN ASSISTANT

## 2022-07-10 PROCEDURE — 87040 BLOOD CULTURE FOR BACTERIA: CPT | Performed by: PHYSICIAN ASSISTANT

## 2022-07-10 PROCEDURE — 99284 EMERGENCY DEPT VISIT MOD MDM: CPT

## 2022-07-10 PROCEDURE — 83605 ASSAY OF LACTIC ACID: CPT | Performed by: PHYSICIAN ASSISTANT

## 2022-07-10 RX ORDER — ACETAMINOPHEN 325 MG/1
650 TABLET ORAL EVERY 6 HOURS PRN
Status: DISCONTINUED | OUTPATIENT
Start: 2022-07-10 | End: 2022-07-11 | Stop reason: HOSPADM

## 2022-07-10 RX ORDER — SODIUM CHLORIDE, SODIUM LACTATE, POTASSIUM CHLORIDE, CALCIUM CHLORIDE 600; 310; 30; 20 MG/100ML; MG/100ML; MG/100ML; MG/100ML
125 INJECTION, SOLUTION INTRAVENOUS CONTINUOUS
Status: DISCONTINUED | OUTPATIENT
Start: 2022-07-10 | End: 2022-07-11

## 2022-07-10 RX ORDER — ACETAMINOPHEN 325 MG/1
975 TABLET ORAL ONCE
Status: COMPLETED | OUTPATIENT
Start: 2022-07-10 | End: 2022-07-10

## 2022-07-10 RX ORDER — METOPROLOL SUCCINATE 25 MG/1
25 TABLET, EXTENDED RELEASE ORAL DAILY
Status: DISCONTINUED | OUTPATIENT
Start: 2022-07-11 | End: 2022-07-11 | Stop reason: HOSPADM

## 2022-07-10 RX ORDER — PHENAZOPYRIDINE HYDROCHLORIDE 100 MG/1
200 TABLET, FILM COATED ORAL ONCE
Status: COMPLETED | OUTPATIENT
Start: 2022-07-10 | End: 2022-07-10

## 2022-07-10 RX ORDER — ENOXAPARIN SODIUM 100 MG/ML
40 INJECTION SUBCUTANEOUS
Status: DISCONTINUED | OUTPATIENT
Start: 2022-07-11 | End: 2022-07-11 | Stop reason: HOSPADM

## 2022-07-10 RX ORDER — KETOROLAC TROMETHAMINE 30 MG/ML
15 INJECTION, SOLUTION INTRAMUSCULAR; INTRAVENOUS ONCE
Status: COMPLETED | OUTPATIENT
Start: 2022-07-10 | End: 2022-07-10

## 2022-07-10 RX ORDER — AMLODIPINE BESYLATE 5 MG/1
5 TABLET ORAL DAILY
Status: DISCONTINUED | OUTPATIENT
Start: 2022-07-11 | End: 2022-07-11 | Stop reason: HOSPADM

## 2022-07-10 RX ORDER — ONDANSETRON 2 MG/ML
4 INJECTION INTRAMUSCULAR; INTRAVENOUS EVERY 6 HOURS PRN
Status: DISCONTINUED | OUTPATIENT
Start: 2022-07-10 | End: 2022-07-11 | Stop reason: HOSPADM

## 2022-07-10 RX ADMIN — SODIUM CHLORIDE 1000 ML: 0.9 INJECTION, SOLUTION INTRAVENOUS at 15:20

## 2022-07-10 RX ADMIN — SODIUM CHLORIDE, SODIUM LACTATE, POTASSIUM CHLORIDE, AND CALCIUM CHLORIDE 125 ML/HR: .6; .31; .03; .02 INJECTION, SOLUTION INTRAVENOUS at 19:21

## 2022-07-10 RX ADMIN — CEFTRIAXONE SODIUM 1000 MG: 10 INJECTION, POWDER, FOR SOLUTION INTRAVENOUS at 23:36

## 2022-07-10 RX ADMIN — KETOROLAC TROMETHAMINE 15 MG: 30 INJECTION, SOLUTION INTRAMUSCULAR at 15:20

## 2022-07-10 RX ADMIN — PHENAZOPYRIDINE 200 MG: 100 TABLET ORAL at 17:40

## 2022-07-10 RX ADMIN — SODIUM CHLORIDE 1000 ML: 0.9 INJECTION, SOLUTION INTRAVENOUS at 17:41

## 2022-07-10 RX ADMIN — CEFEPIME HYDROCHLORIDE 2000 MG: 2 INJECTION, POWDER, FOR SOLUTION INTRAVENOUS at 15:56

## 2022-07-10 RX ADMIN — ACETAMINOPHEN 975 MG: 325 TABLET, FILM COATED ORAL at 15:20

## 2022-07-10 NOTE — ASSESSMENT & PLAN NOTE
· At home he is amlodipine, hydrochlorothiazide, valsartan  · Given sepsis currently would rather hold HCTZ and valsartan to prevent nephrotoxicity, will keep amlodipine for the time being  · Monitor blood pressure

## 2022-07-10 NOTE — ASSESSMENT & PLAN NOTE
· Noted with fever, tachycardia, leukocytosis on arrival  · This is due to UTI  · Treat UTI as mentioned  · Continue IV fluids for the time being, will keep on  cc/hour

## 2022-07-10 NOTE — H&P
[address] [facility] 1970, 46 y o  male MRN: [id number]  Unit/Bed#: ED 19 Encounter: [id number]  Primary Care Provider: Yosef Louis MD   Date and time admitted to hospital: 7/10/2022  2:46 PM    Urinary tract infection  Assessment & Plan    Patient is a 47yo obese male with hx of previous urethral surgery in the past who now presents with dysuria, suprapubic pain and fever  UA suggestive of pyuria  CT of the abdomen - " Enlarged prostate with presumed urethroplasty changes to the prostatic urethra   The previously noted enhancement which may have represented periurethral/prostatic inflammation are not evaluated on noncontrast exam  Normal bladder   No acute findings in the abdomen or pelvis   " Clinically no signs of prostatitis, denies any rectal pain or tenesmus or perineal pain    · No reported history of resistance as per patient, will start ceftriaxone  · Follow urine culture  · Monitor clinically, temperature curve, white count, abdominal exam    Sepsis ([address] )  Assessment & Plan    · Noted with fever, tachycardia, leukocytosis on arrival  · This is due to UTI  · Treat UTI as mentioned  · Continue IV fluids for the time being, will keep on  cc/hour    Obesity  Assessment & Plan    · BMI of 36 Patient would benefit from weight loss after resolution of acute illness    BPH (benign prostatic hyperplasia)  Assessment & Plan    · Patient does have prostatic enlargement on imaging but denies any tenesmus or rectal pain, low suspicion for prostatitis    HTN (hypertension)  Assessment & Plan    · At home he is amlodipine, hydrochlorothiazide, valsartan  · Given sepsis currently would rather hold HCTZ and valsartan to prevent nephrotoxicity, will keep amlodipine for the time being  · Monitor blood pressure    VTE Prophylaxis: Enoxaparin (Lovenox)  / sequential compression device   Code Status: FC  POLST: POLST form is not discussed and not completed at this time   Discussion with family:  Discussed with significant other at the bedside    Anticipated Length of Stay:  Patient will be admitted on an Observation basis with an anticipated length of stay of  less than 2 midnights  Justification for Hospital Stay:  Sepsis with UTI    Total Time for Visit, including Counseling / Coordination of Care: 45 minutes  Greater than 50% of this total time spent on direct patient counseling and coordination of care  Chief Complaint:   Fever, suprapubic pain, dysuria    History of Present Illness:    Jessica Maher is a 46 y o  male who presents with fevers, suprapubic pain, dysuria  Patient appears to have a history of hypertension, hyperlipidemia, ureteral stricture status post urethroplasty, obesity, who presents with dysuria and suprapubic pain for 1 day  He is also noted to be febrile  He does endorse some suprapubic pain and urgency as well, feels overall unwell  He denies any rectal pain or pain on defecation  He does have a history of prostatic enlargement  Patient does mention having any UTI previously in May  No recent antibiotic use however, no history of resistance  At the emergency department he did have a fever of a temperature of 101 2°, tachycardia heart rate of 108, leukocytosis with phone white count of 1960[address], urinalysis with pyuria revealing positive nitrate, leukocytes  Review of Systems:    Review of Systems   Constitutional: Positive for fatigue and fever  Gastrointestinal: Positive for abdominal pain  Genitourinary: Positive for dysuria  All other systems reviewed and are negative        Past Medical and Surgical History:     Past Medical History:   Diagnosis Date    Enlarged prostate     Hyperlipidemia     Hypertension     Neuropathy        Past Surgical History:   Procedure Laterality Date    KNEE ARTHROSCOPY Right     [place] CYSTOSCOPY,DIR VIS INT URETHROTOMY N/A 10/23/2020    Procedure: DIRECT VISUAL INTERAL URETHROTOMY (DVIU); Surgeon: Verner Squires, MD;  [facility];  Service: Urology    [place] CYSTOURETHROSCOPY N/A 10/23/2020    Procedure: Juluis Door;  Surgeon: Verner Squires, MD;  [facility];  Service: Urology    [facility] KNEE [facility] Left 6/2/2022    Procedure: Left knee arthroscopy  partial Medial menisectomy  Condroplasty ;  Surgeon: Tiffany Phillips MD;  [facility];  Service: Orthopedics    TRANSURETHRAL RESECTION OF PROSTATE         Meds/Allergies:    Prior to Admission medications    Medication Sig Start Date End Date Taking? Authorizing Provider   acetaminophen (TYLENOL) 650 mg CR tablet Take 1 tablet (650 mg total) by mouth every 8 (eight) hours as needed for mild pain 6/2/22   Tiffany Phillips MD   amLODIPine (NORVASC) 5 mg tablet Take 1 tablet (5 mg total) by mouth daily 4/19/22   Karon Stewart MD   celecoxib (CeleBREX) 100 mg capsule Take 1 capsule (100 mg total) by mouth 2 (two) times a day 6/2/22   Tiffany Phillips MD   hydrochlorothiazide (MICROZIDE) 12 5 mg capsule Take 12 5 mg by mouth daily 10/25/21   Historical Provider, MD   metoprolol succinate (Toprol XL) 25 mg 24 hr tablet Take 1 tablet (25 mg total) by mouth daily 4/19/22   Karon Stewart MD   phenazopyridine (PYRIDIUM) 100 mg tablet Take 100 mg by mouth 3 (three) times a day as needed for bladder spasms      Historical Provider, MD   valsartan (DIOVAN) 320 MG tablet Take 1 tablet (320 mg total) by mouth daily 4/19/22   Karon Stewart MD     I have reviewed home medications with patient personally      Allergies: No Known Allergies    Social History:     Marital Status: /Civil Union   Substance Use History:   Social History     Substance and Sexual Activity   Alcohol Use No     Social History     Tobacco Use   Smoking Status Never Smoker   Smokeless Tobacco Never Used     Social History     Substance and Sexual Activity   Drug Use No       Family History:    non-contributory    Physical Exam: Vitals:   Blood Pressure: 147/91 (07/10/22 1800)  Pulse: 93 (07/10/22 1800)  Temperature: 100 °F (37 8 °C) (07/10/22 1633)  Temp Source: Oral (07/10/22 1633)  Respirations: 18 (07/10/22 1800)  SpO2: 95 % (07/10/22 1800)    Physical Exam  Vitals and nursing note reviewed  Constitutional:       Appearance: Normal appearance  Comments: Obese male in bed, awake   HENT:      Head: Normocephalic and atraumatic  Right Ear: External ear normal       Left Ear: External ear normal       Nose: Nose normal  No congestion or rhinorrhea  Mouth/Throat:      Mouth: Mucous membranes are moist       Pharynx: Oropharynx is clear  No oropharyngeal exudate or posterior oropharyngeal erythema  Eyes:      General: No scleral icterus  Right eye: No discharge  Left eye: No discharge  Pupils: Pupils are equal, round, and reactive to light  Neck:      Vascular: No carotid bruit  Cardiovascular:      Rate and Rhythm: Normal rate and regular rhythm  Pulses: Normal pulses  Heart sounds: No murmur heard  No friction rub  No gallop  Pulmonary:      Effort: Pulmonary effort is normal  No respiratory distress  Breath sounds: Normal breath sounds  No stridor  No wheezing, rhonchi or rales  Abdominal:      General: Abdomen is flat  Bowel sounds are normal  There is no distension  Palpations: Abdomen is soft  There is no mass  Tenderness: There is no abdominal tenderness  There is no guarding or rebound  Hernia: No hernia is present  Comments: Suprapubic tenderness on deep palpation but no rebound or guarding   Musculoskeletal:         General: No swelling, tenderness, deformity or signs of injury  Normal range of motion  Cervical back: Normal range of motion  No rigidity  No muscular tenderness  Lymphadenopathy:      Cervical: No cervical adenopathy  Skin:     General: Skin is warm and dry        Capillary Refill: Capillary refill takes less than 2 seconds  Coloration: Skin is not jaundiced or pale  Findings: No bruising or erythema  Neurological:      General: No focal deficit present  Mental Status: He is alert and oriented to person, place, and time  Mental status is at baseline  Cranial Nerves: No cranial nerve deficit  Sensory: No sensory deficit  Motor: No weakness  Coordination: Coordination normal       Deep Tendon Reflexes: Reflexes normal    Psychiatric:         Mood and Affect: Mood normal          Behavior: Behavior normal          Thought Content: Thought content normal          Judgment: Judgment normal              Additional Data:     Lab Results: I have personally reviewed pertinent reports  Results from last 7 days   Lab Units 07/10/22  1514   WBC Thousand/uL 21 07*   HEMOGLOBIN g/dL 15 2   HEMATOCRIT % 43 2   PLATELETS Thousands/uL 195   NEUTROS PCT % 83*   LYMPHS PCT % 9*   MONOS PCT % 7   EOS PCT % 0     Results from last 7 days   Lab Units 07/10/22  1514   SODIUM mmol/L 135*   POTASSIUM mmol/L 3 5   CHLORIDE mmol/L 98*   CO2 mmol/L 27   BUN mg/dL 13   CREATININE mg/dL 1 08   ANION GAP mmol/L 10   CALCIUM mg/dL 8 8   ALBUMIN g/dL 4 0   TOTAL BILIRUBIN mg/dL 0 89   ALK PHOS U/L 100   ALT U/L 46   AST U/L 19   GLUCOSE RANDOM mg/dL 110     Results from last 7 days   Lab Units 07/10/22  1514   INR  1 03             Results from last 7 days   Lab Units 07/10/22  1514   LACTIC ACID mmol/L 0 9   PROCALCITONIN ng/ml 0 20       Imaging: I have personally reviewed pertinent reports  CT abdomen pelvis wo contrast   Final Result by Allison Brooks MD (07/10 1655)      1  Enlarged prostate with presumed urethroplasty changes to the prostatic urethra  The previously noted enhancement which may have represented periurethral/prostatic inflammation are not evaluated on noncontrast exam  Normal bladder  2   No acute findings in the abdomen or pelvis                  R      Workstation performed: [id number]               Allscripts / [facility] Records Reviewed: Yes     ** Please Note: This note has been constructed using a voice recognition system   **

## 2022-07-10 NOTE — ED PROVIDER NOTES
History  Chief Complaint   Patient presents with    Difficulty Urinating     Difficulty urinating, with a burning sensation  Hx of prostate surgery 2 years ago     47yo male with a history of hypertension, hyperlipidemia, urethral stricture s/p urethroplasty, and obesity presenting for evaluation of dysuria and suprapubic pain x 1 day  Patient reports a fairly abrupt onset of symptoms yesterday evening  He reports significant discomfort with urination on suprapubic discomfort  He also reports subjective fevers and chills although did not check his temperature  He states he has similar symptoms about 2 months ago and was treated with antibiotics  Patient also reports some minor back pain  He denies any nausea, vomiting, testicular pain  History provided by:  Patient   used: No    Difficulty Urinating  Presenting symptoms: dysuria and penile pain    Presenting symptoms: no scrotal pain and no swelling    Context: during urination    Relieved by:  Nothing  Worsened by:  Nothing  Associated symptoms: abdominal pain and fever    Associated symptoms: no diarrhea, no groin pain, no nausea, no scrotal swelling, no urinary retention and no vomiting        Prior to Admission Medications   Prescriptions Last Dose Informant Patient Reported?  Taking?   acetaminophen (TYLENOL) 650 mg CR tablet  Self No No   Sig: Take 1 tablet (650 mg total) by mouth every 8 (eight) hours as needed for mild pain   amLODIPine (NORVASC) 5 mg tablet  Self No No   Sig: Take 1 tablet (5 mg total) by mouth daily   celecoxib (CeleBREX) 100 mg capsule  Self No No   Sig: Take 1 capsule (100 mg total) by mouth 2 (two) times a day   hydrochlorothiazide (MICROZIDE) 12 5 mg capsule  Self Yes No   Sig: Take 12 5 mg by mouth daily   metoprolol succinate (Toprol XL) 25 mg 24 hr tablet  Self No No   Sig: Take 1 tablet (25 mg total) by mouth daily   phenazopyridine (PYRIDIUM) 100 mg tablet  Self Yes No   Sig: Take 100 mg by mouth 3 (three) times a day as needed for bladder spasms     valsartan (DIOVAN) 320 MG tablet  Self No No   Sig: Take 1 tablet (320 mg total) by mouth daily      Facility-Administered Medications: None       Past Medical History:   Diagnosis Date    Enlarged prostate     Hyperlipidemia     Hypertension     Neuropathy        Past Surgical History:   Procedure Laterality Date    KNEE ARTHROSCOPY Right     AL CYSTOSCOPY,DIR VIS INT URETHROTOMY N/A 10/23/2020    Procedure: DIRECT VISUAL INTERAL URETHROTOMY (DVIU); Surgeon: Curt Ramirez MD;  [facility];  Service: Urology    AL CYSTOURETHROSCOPY N/A 10/23/2020    Procedure: Binnie Deems;  Surgeon: Curt Ramirez MD;  [facility];  Service: Urology    AL KNEE [facility] Left 6/2/2022    Procedure: Left knee arthroscopy  partial Medial menisectomy  Condroplasty ;  Surgeon: Mary Gómez MD;  Location: [facility];  Service: Orthopedics    TRANSURETHRAL RESECTION OF PROSTATE         Family History   Problem Relation Age of Onset    No Known Problems Mother     No Known Problems Father      I have reviewed and agree with the history as documented  E-Cigarette/Vaping    E-Cigarette Use Never User      E-Cigarette/Vaping Substances     Social History     Tobacco Use    Smoking status: Never Smoker    Smokeless tobacco: Never Used   Vaping Use    Vaping Use: Never used   Substance Use Topics    Alcohol use: No    Drug use: No       Review of Systems   Constitutional: Positive for chills and fever  HENT: Negative for drooling and voice change  Eyes: Negative for discharge and redness  Respiratory: Negative for shortness of breath and stridor  Cardiovascular: Negative for chest pain and leg swelling  Gastrointestinal: Positive for abdominal pain  Negative for diarrhea, nausea and vomiting  Genitourinary: Positive for difficulty urinating, dysuria and penile pain  Negative for scrotal swelling  Musculoskeletal: Positive for back pain  Negative for neck pain and neck stiffness  Skin: Negative for color change and rash  Neurological: Negative for seizures and syncope  Psychiatric/Behavioral: Negative for confusion  The patient is not nervous/anxious  All other systems reviewed and are negative  Physical Exam  Physical Exam  Vitals and nursing note reviewed  Constitutional:       General: He is not in acute distress  Appearance: He is well-developed  Comments: Appears uncomfortable, non-toxic   HENT:      Head: Normocephalic and atraumatic  Right Ear: External ear normal       Left Ear: External ear normal    Eyes:      General: No scleral icterus  Right eye: No discharge  Left eye: No discharge  Conjunctiva/sclera: Conjunctivae normal    Cardiovascular:      Rate and Rhythm: Normal rate and regular rhythm  Heart sounds: Normal heart sounds  No murmur heard  Pulmonary:      Effort: Pulmonary effort is normal  No respiratory distress  Breath sounds: Normal breath sounds  No stridor  No wheezing or rales  Abdominal:      General: Bowel sounds are normal  There is no distension  Palpations: Abdomen is soft  Tenderness: There is abdominal tenderness in the suprapubic area  There is no guarding or rebound  Musculoskeletal:         General: No deformity  Normal range of motion  Cervical back: Normal range of motion and neck supple  Skin:     General: Skin is warm and dry  Neurological:      Mental Status: He is alert  He is not disoriented  GCS: GCS eye subscore is 4  GCS verbal subscore is 5  GCS motor subscore is 6     Psychiatric:         Behavior: Behavior normal          Vital Signs  ED Triage Vitals   Temperature Pulse Respirations Blood Pressure SpO2   07/10/22 1208 07/10/22 1208 07/10/22 1208 07/10/22 1209 07/10/22 1208   99 4 °F (37 4 °C) (!) 108 18 145/88 95 %      Temp Source Heart Rate Source Patient Position - Orthostatic VS BP Location FiO2 (%)   07/10/22 1208 07/10/22 1501 07/10/22 1209 07/10/22 1209 --   Tympanic Monitor Sitting Left arm       Pain Score       07/10/22 1633       No Pain           Vitals:    07/10/22 1519 07/10/22 1633 07/10/22 1700 07/10/22 1730   BP: 162/84 158/78 127/70 134/84   Pulse: 94 87 85 86   Patient Position - Orthostatic VS: Lying  Sitting Sitting         Visual Acuity      ED Medications  Medications   sodium chloride 0 9 % bolus 1,000 mL (1,000 mL Intravenous New Bag 7/10/22 1741)   sodium chloride 0 9 % bolus 1,000 mL (0 mL Intravenous Stopped 7/10/22 1633)   ketorolac (TORADOL) injection 15 mg (15 mg Intravenous Given 7/10/22 1520)   acetaminophen (TYLENOL) tablet 975 mg (975 mg Oral Given 7/10/22 1520)   cefepime (MAXIPIME) 2 g/50 mL dextrose IVPB (0 mg Intravenous Stopped 7/10/22 1633)   phenazopyridine (PYRIDIUM) tablet 200 mg (200 mg Oral Given 7/10/22 1740)       Diagnostic Studies  Results Reviewed     Procedure Component Value Units Date/Time    FLU/RSV/COVID - if FLU/RSV clinically relevant [[id number]]  (Normal) Collected: 07/10/22 1556    Lab Status: Final result Specimen: Nares from Nose Updated: 07/10/22 1654     SARS-CoV-2 Negative     INFLUENZA A PCR Negative     INFLUENZA B PCR Negative     RSV PCR Negative    Narrative:      FOR PEDIATRIC PATIENTS - copy/paste COVID Guidelines URL to browser: [email or web address]    SARS-CoV-2 assay is a Nucleic Acid Amplification assay intended for the  qualitative detection of nucleic acid from SARS-CoV-2 in nasopharyngeal  swabs  Results are for the presumptive identification of SARS-CoV-2 RNA  Positive results are indicative of infection with SARS-CoV-2, the virus  causing COVID-19, but do not rule out bacterial infection or co-infection  with other viruses   Laboratories within the [place] and its  territories are required to report all positive results to the appropriate  public health authorities  Negative results do not preclude SARS-CoV-2  infection and should not be used as the sole basis for treatment or other  patient management decisions  Negative results must be combined with  clinical observations, patient history, and epidemiological information  This test has not been FDA cleared or approved  This test has been authorized by FDA under an Emergency Use Authorization  (EUA)  This test is only authorized for the duration of time the  declaration that circumstances exist justifying the authorization of the  emergency use of an in vitro diagnostic tests for detection of SARS-CoV-2  virus and/or diagnosis of COVID-19 infection under section 564(b)(1) of  the Act, 21 U  S C  147UFW-0(P)(3), unless the authorization is terminated  or revoked sooner  The test has been validated but independent review by FDA  and CLIA is pending  Test performed using [employer] Gene[employer]pert: This RT-PCR assay targets N2,  a region unique to SARS-CoV-2  A conserved region in the E-gene was chosen  for pan-Sarbecovirus detection which includes SARS-CoV-2  Procalcitonin [[id number]]  (Normal) Collected: 07/10/22 1514    Lab Status: Final result Specimen: Blood from Arm, Right Updated: 07/10/22 1552     Procalcitonin 0 20 ng/ml     Lactic acid [[id number]]  (Normal) Collected: 07/10/22 1514    Lab Status: Final result Specimen: Blood from Arm, Right Updated: 07/10/22 1544     LACTIC ACID 0 9 mmol/L     Narrative:      Result may be elevated if tourniquet was used during collection      Comprehensive metabolic panel [[id number]]  (Abnormal) Collected: 07/10/22 1514    Lab Status: Final result Specimen: Blood from Arm, Right Updated: 07/10/22 1542     Sodium 135 mmol/L      Potassium 3 5 mmol/L      Chloride 98 mmol/L      CO2 27 mmol/L      ANION GAP 10 mmol/L      BUN 13 mg/dL      Creatinine 1 08 mg/dL      Glucose 110 mg/dL      Calcium 8 8 mg/dL      AST 19 U/L      ALT 46 U/L Alkaline Phosphatase 100 U/L      Total Protein 7 7 g/dL      Albumin 4 0 g/dL      Total Bilirubin 0 89 mg/dL      eGFR 78 ml/min/1 73sq m     Narrative:      National Kidney Disease Foundation guidelines for Chronic Kidney Disease (CKD):     Stage 1 with normal or high GFR (GFR > 90 mL/min/1 73 square meters)    Stage 2 Mild CKD (GFR = 60-89 mL/min/1 73 square meters)    Stage 3A Moderate CKD (GFR = 45-59 mL/min/1 73 square meters)    Stage 3B Moderate CKD (GFR = 30-44 mL/min/1 73 square meters)    Stage 4 Severe CKD (GFR = 15-29 mL/min/1 73 square meters)    Stage 5 End Stage CKD (GFR <15 mL/min/1 73 square meters)  Note: GFR calculation is accurate only with a steady state creatinine    Protime-INR [[id number]]  (Normal) Collected: 07/10/22 1514    Lab Status: Final result Specimen: Blood from Arm, Right Updated: 07/10/22 1536     Protime 13 0 seconds      INR 1 03    APTT [[id number]]  (Normal) Collected: 07/10/22 1514    Lab Status: Final result Specimen: Blood from Arm, Right Updated: 07/10/22 1536     PTT 32 seconds     CBC and differential [[id number]]  (Abnormal) Collected: 07/10/22 1514    Lab Status: Final result Specimen: Blood from Arm, Right Updated: 07/10/22 1521     WBC 21 07 Thousand/uL      RBC 5 09 Million/uL      Hemoglobin 15 2 g/dL      Hematocrit 43 2 %      MCV 85 fL      MCH 29 9 pg      MCHC 35 2 g/dL      RDW 13 0 %      MPV 10 6 fL      Platelets 654 Thousands/uL      nRBC 0 /100 WBCs      Neutrophils Relative 83 %      Immat GRANS % 1 %      Lymphocytes Relative 9 %      Monocytes Relative 7 %      Eosinophils Relative 0 %      Basophils Relative 0 %      Neutrophils Absolute 17 60 Thousands/µL      Immature Grans Absolute 0 11 Thousand/uL      Lymphocytes Absolute 1 80 Thousands/µL      Monocytes Absolute 1 48 Thousand/µL      Eosinophils Absolute 0 00 Thousand/µL      Basophils Absolute 0 08 Thousands/µL     Blood culture #2 [[id number]] Collected: 07/10/22 1514    Lab Status:  In process Specimen: Blood from Arm, Right Updated: 07/10/22 1520    Blood culture #1 [[id number]] Collected: 07/10/22 1514    Lab Status: In process Specimen: Blood from Arm, Left Updated: 07/10/22 1520    Urine Microscopic [[id number]]  (Abnormal) Collected: 07/10/22 1214    Lab Status: Final result Specimen: Urine, Clean Catch Updated: 07/10/22 1241     RBC, UA 20-30 /hpf      WBC, UA 30-50 /hpf      Epithelial Cells Occasional /hpf      Bacteria, UA Occasional /hpf      MUCUS THREADS Occasional    Urine culture [[id number]] Collected: 07/10/22 1214    Lab Status: In process Specimen: Urine, Clean Catch Updated: 07/10/22 1240    UA w Reflex to Microscopic w Reflex to Culture [[id number]]  (Abnormal) Collected: 07/10/22 1214    Lab Status: Final result Specimen: Urine, Clean Catch Updated: 07/10/22 1232     Color, UA Yellow     Clarity, UA Slightly Cloudy     Specific Gravity, UA 1 025     pH, UA 6 0     Leukocytes, UA Moderate     Nitrite, UA Positive     Protein, UA 30 (1+) mg/dl      Glucose, UA Negative mg/dl      Ketones, UA Negative mg/dl      Urobilinogen, UA 0 2 E U /dl      Bilirubin, UA Negative     Occult Blood, UA Moderate                 CT abdomen pelvis wo contrast   Final Result by Chris Jamil MD (07/10 1655)      1  Enlarged prostate with presumed urethroplasty changes to the prostatic urethra  The previously noted enhancement which may have represented periurethral/prostatic inflammation are not evaluated on noncontrast exam  Normal bladder  2   No acute findings in the abdomen or pelvis  R      Workstation performed: [id number]                    Procedures  Procedures         ED Course                     MDM  Number of Diagnoses or Management Options  Sepsis [facility]): new and requires workup  Urinary tract infection: new and requires workup  Diagnosis management comments: 47yo male with a prior history ureteroplasty presenting for UTI symptoms, fevers, chills x 1 day   He is febrile to 101 on my initial exam with associated tachycardia  Blood pressure stable  He has significant suprapubic discomfort on exam     Initial ED plan: Check septic workup including blood cultures, lactate, and UA  Will obtain CT abdomen  IV Toradol, Tylenol, fluid bolus for symptoms  Final assessment:  Labs significant for a leukocytosis with a white count of 21   UA is nitrite positive with 30-50 WBCs consistent with a UTI  Remainder of labs unremarkable  CT abdomen negative for ureterolithiasis or other acute abnormalities  He is meeting sepsis criteria with fever, tachycardia, and leukocytosis  IV cefepime given and he was admitted for further management  Amount and/or Complexity of Data Reviewed  Clinical lab tests: ordered and reviewed  Tests in the radiology section of CPT®: ordered and reviewed  Review and summarize past medical records: yes  Discuss the patient with other providers: yes  Independent visualization of images, tracings, or specimens: yes    Risk of Complications, Morbidity, and/or Mortality  Presenting problems: moderate  Diagnostic procedures: moderate  Management options: moderate    Patient Progress  Patient progress: stable      Disposition  Final diagnoses:   Sepsis ([facility] [address] )   Urinary tract infection     Time reflects when diagnosis was documented in both MDM as applicable and the Disposition within this note     Time User Action Codes Description Comment    [facility]10[facility]2022  5:55  [facility] [place] [facility]A41 [id number]] Sepsis ([facility] [address] )     7/10/2022  5:55 PM [address] [place], [place] [address]y, [place] [N39 [id number]] Urinary tract infection       ED Disposition     ED Disposition   Admit    Condition   Stable    Date/Time   Sun Jul 10, 2022  5:55 PM    Comment   Case was discussed with Dr Sheri Hollingsworth and the patient's admission status was agreed to be Admission Status: observation status to the service of Dr Sheri Hollingsworth              Follow-up Information    None         Patient's Medications   Discharge Prescriptions    No medications on file       No discharge procedures on file      PDMP Review     None          ED Provider  Electronically Signed by           Patrice Ge PA-C  07/11/22 6802

## 2022-07-10 NOTE — ASSESSMENT & PLAN NOTE
· Patient does have prostatic enlargement on imaging but denies any tenesmus or rectal pain, low suspicion for prostatitis

## 2022-07-10 NOTE — ASSESSMENT & PLAN NOTE
Patient is a 47yo obese male with hx of previous urethral surgery in the past who now presents with dysuria, suprapubic pain and fever  UA suggestive of pyuria  CT of the abdomen - " Enlarged prostate with presumed urethroplasty changes to the prostatic urethra   The previously noted enhancement which may have represented periurethral/prostatic inflammation are not evaluated on noncontrast exam  Normal bladder   No acute findings in the abdomen or pelvis   " Clinically no signs of prostatitis, denies any rectal pain or tenesmus or perineal pain    · No reported history of resistance as per patient, will start ceftriaxone  · Follow urine culture  · Monitor clinically, temperature curve, white count, abdominal exam

## 2022-07-11 VITALS
DIASTOLIC BLOOD PRESSURE: 96 MMHG | OXYGEN SATURATION: 97 % | HEIGHT: 64 IN | TEMPERATURE: 98.9 F | BODY MASS INDEX: 36.02 KG/M2 | SYSTOLIC BLOOD PRESSURE: 160 MMHG | WEIGHT: 211 LBS | HEART RATE: 79 BPM | RESPIRATION RATE: 18 BRPM

## 2022-07-11 LAB
ANION GAP SERPL CALCULATED.3IONS-SCNC: 7 MMOL/L (ref 4–13)
BASOPHILS # BLD AUTO: 0.07 THOUSANDS/ΜL (ref 0–0.1)
BASOPHILS NFR BLD AUTO: 1 % (ref 0–1)
BUN SERPL-MCNC: 13 MG/DL (ref 5–25)
CALCIUM SERPL-MCNC: 8.1 MG/DL (ref 8.3–10.1)
CHLORIDE SERPL-SCNC: 105 MMOL/L (ref 100–108)
CO2 SERPL-SCNC: 27 MMOL/L (ref 21–32)
CREAT SERPL-MCNC: 0.94 MG/DL (ref 0.6–1.3)
EOSINOPHIL # BLD AUTO: 0.09 THOUSAND/ΜL (ref 0–0.61)
EOSINOPHIL NFR BLD AUTO: 1 % (ref 0–6)
ERYTHROCYTE [DISTWIDTH] IN BLOOD BY AUTOMATED COUNT: 13.1 % (ref 11.6–15.1)
GFR SERPL CREATININE-BSD FRML MDRD: 92 ML/MIN/1.73SQ M
GLUCOSE SERPL-MCNC: 114 MG/DL (ref 65–140)
HCT VFR BLD AUTO: 38.9 % (ref 36.5–49.3)
HGB BLD-MCNC: 13.6 G/DL (ref 12–17)
IMM GRANULOCYTES # BLD AUTO: 0.04 THOUSAND/UL (ref 0–0.2)
IMM GRANULOCYTES NFR BLD AUTO: 0 % (ref 0–2)
LYMPHOCYTES # BLD AUTO: 1.98 THOUSANDS/ΜL (ref 0.6–4.47)
LYMPHOCYTES NFR BLD AUTO: 14 % (ref 14–44)
MCH RBC QN AUTO: 30.1 PG (ref 26.8–34.3)
MCHC RBC AUTO-ENTMCNC: 35 G/DL (ref 31.4–37.4)
MCV RBC AUTO: 86 FL (ref 82–98)
MONOCYTES # BLD AUTO: 1.15 THOUSAND/ΜL (ref 0.17–1.22)
MONOCYTES NFR BLD AUTO: 8 % (ref 4–12)
NEUTROPHILS # BLD AUTO: 10.44 THOUSANDS/ΜL (ref 1.85–7.62)
NEUTS SEG NFR BLD AUTO: 76 % (ref 43–75)
NRBC BLD AUTO-RTO: 0 /100 WBCS
PLATELET # BLD AUTO: 173 THOUSANDS/UL (ref 149–390)
PMV BLD AUTO: 10.6 FL (ref 8.9–12.7)
POTASSIUM SERPL-SCNC: 3.6 MMOL/L (ref 3.5–5.3)
RBC # BLD AUTO: 4.52 MILLION/UL (ref 3.88–5.62)
SODIUM SERPL-SCNC: 139 MMOL/L (ref 136–145)
WBC # BLD AUTO: 13.77 THOUSAND/UL (ref 4.31–10.16)

## 2022-07-11 PROCEDURE — 97161 PT EVAL LOW COMPLEX 20 MIN: CPT

## 2022-07-11 PROCEDURE — 36415 COLL VENOUS BLD VENIPUNCTURE: CPT | Performed by: INTERNAL MEDICINE

## 2022-07-11 PROCEDURE — 85025 COMPLETE CBC W/AUTO DIFF WBC: CPT | Performed by: INTERNAL MEDICINE

## 2022-07-11 PROCEDURE — 80048 BASIC METABOLIC PNL TOTAL CA: CPT | Performed by: INTERNAL MEDICINE

## 2022-07-11 PROCEDURE — 99217 PR OBSERVATION CARE DISCHARGE MANAGEMENT: CPT | Performed by: NURSE PRACTITIONER

## 2022-07-11 RX ORDER — CEPHALEXIN 500 MG/1
500 CAPSULE ORAL EVERY 6 HOURS SCHEDULED
Qty: 40 CAPSULE | Refills: 0 | Status: SHIPPED | OUTPATIENT
Start: 2022-07-11 | End: 2022-07-21

## 2022-07-11 RX ORDER — PHENAZOPYRIDINE HYDROCHLORIDE 100 MG/1
100 TABLET, FILM COATED ORAL 3 TIMES DAILY PRN
Qty: 10 TABLET | Refills: 0 | Status: SHIPPED | OUTPATIENT
Start: 2022-07-11

## 2022-07-11 RX ADMIN — METOPROLOL SUCCINATE 25 MG: 25 TABLET, EXTENDED RELEASE ORAL at 09:21

## 2022-07-11 RX ADMIN — AMLODIPINE BESYLATE 5 MG: 5 TABLET ORAL at 09:21

## 2022-07-11 NOTE — UTILIZATION REVIEW
Initial Clinical Review    Admission: Date/Time/Statement:   Admission Orders (From admission, onward)     Ordered        07/10/22 1755  Place in Observation  Once                      Orders Placed This Encounter   Procedures    Place in Observation     Standing Status:   Standing     Number of Occurrences:   1     Order Specific Question:   Level of Care     Answer:   Med Surg [16]     ED Arrival Information     Expected   -    Arrival   7/10/2022 10:23    Acuity   Urgent            Means of arrival   Walk-In    Escorted by   Spouse    Service   Hospitalist    Admission type   Urgent            Arrival complaint   difficulty urinating           Chief Complaint   Patient presents with    Difficulty Urinating     Difficulty urinating, with a burning sensation  Hx of prostate surgery 2 years ago       Initial Presentation: 46 y o  male to ED from home w/ fevers, suprapubic pain , dysuria   PMHX HTN , HLD , ureteral stricture s/p urethroplasty , obesity   + suprapubic pain and urgency   In ED fever 101 2 , wbc 21K , Ua + nitrates and leukocytes  Admitted OBS status w/ UTI plan to f/u ua cx , start ceftriaxone , cont IVF   HTN hold HCTZ and valsartan and keep on amlodipine and monitor BP                                                                     ED Triage Vitals   Temperature Pulse Respirations Blood Pressure SpO2   07/10/22 1208 07/10/22 1208 07/10/22 1208 07/10/22 1209 07/10/22 1208   99 4 °F (37 4 °C) (!) 108 18 145/88 95 %      Temp Source Heart Rate Source Patient Position - Orthostatic VS BP Location FiO2 (%)   07/10/22 1208 07/10/22 1501 07/10/22 1209 07/10/22 1209 --   Tympanic Monitor Sitting Left arm       Pain Score       07/10/22 1633       No Pain          Wt Readings from Last 1 Encounters:   07/11/22 95 7 kg (211 lb)     Additional Vital Signs:   07/11/22 0705 -- 79 18 160/96 -- 97 % None (Room air) Sitting   07/11/22 0400 -- 77 -- 116/65 85 95 % -- --   07/11/22 0330 -- 77 -- 153/77 107 96 % -- --   07/11/22 0200 -- 74 -- 124/66 88 96 % -- --   07/11/22 0000 -- 81 -- 139/82 104 95 % -- --   07/10/22 2300 -- 81 -- 156/93 117 96 % -- --   07/10/22 2200 -- 84 18 135/61 88 97 % None (Room air) Sitting   07/10/22 2145 -- 82 22 143/80 105 99 % None (Room air) Sitting   07/10/22 2100 -- 88 18 159/84 115 95 % None (Room air) Sitting   07/10/22 2030 -- 82 18 144/78 105 96 % None (Room air) Sitting   07/10/22 2000 -- 80 20 140/75 100 96 % None (Room air) Sitting   07/10/22 1930 -- 86 20 138/79 103 95 % None (Room air) Sitting   07/10/22 1915 98 9 °F (37 2 °C) 84 20 125/77 -- 93 % None (Room air) Sitting   07/10/22 1900 -- 86 20 124/70 92 94 % None (Room air) Sitting   07/10/22 1830 -- 86 20 141/76 101 97 % None (Room air) Sitting   07/10/22 1800 -- 93 18 147/91 113 95 % None (Room air) Sitting   07/10/22 1730 -- 86 20 134/84 104 95 % None (Room air) Sitting   07/10/22 1700 -- 85 -- 127/70 92 97 % None (Room air) Sitting   07/10/22 1633 100 °F (37 8 °C) 87 20 158/78 -- 100 % None (Room air) --   07/10/22 1519 101 2 °F (38 4 °C) Abnormal  94 20 162/84 -- 99 % None (Room air) Lying   07/10/22 1501 101 °F (38 3 °C) Abnormal  98 18 162/84 -- 96 % None (Room air) Lying   07/10/22 1209 -- -- -- 145/88 -- -- -- Sitting         Pertinent Labs/Diagnostic Test Results:   CT abdomen pelvis wo contrast   Final Result by Jessica Eagle MD (07/10 1655)      1  Enlarged prostate with presumed urethroplasty changes to the prostatic urethra  The previously noted enhancement which may have represented periurethral/prostatic inflammation are not evaluated on noncontrast exam  Normal bladder  2   No acute findings in the abdomen or pelvis                  R      Workstation performed: [id number]           Results from last 7 days   Lab Units 07/10/22  1556   SARS-COV-2  Negative     Results from last 7 days   Lab Units 07/11/22  0433 07/10/22  1514   WBC Thousand/uL 13 77* 21 07*   HEMOGLOBIN g/dL 13 6 15 2 HEMATOCRIT % 38 9 43 2   PLATELETS Thousands/uL 173 195   NEUTROS ABS Thousands/µL 10 44* 17 60*     Results from last 7 days   Lab Units 07/11/22  0433 07/10/22  1514   SODIUM mmol/L 139 135*   POTASSIUM mmol/L 3 6 3 5   CHLORIDE mmol/L 105 98*   CO2 mmol/L 27 27   ANION GAP mmol/L 7 10   BUN mg/dL 13 13   CREATININE mg/dL 0 94 1 08   EGFR ml/min/1 73sq m 92 78   CALCIUM mg/dL 8 1* 8 8     Results from last 7 days   Lab Units 07/10/22  1514   AST U/L 19   ALT U/L 46   ALK PHOS U/L 100   TOTAL PROTEIN g/dL 7 7   ALBUMIN g/dL 4 0   TOTAL BILIRUBIN mg/dL 0 89     Results from last 7 days   Lab Units 07/11/22  0433 07/10/22  1514   GLUCOSE RANDOM mg/dL 114 110     Results from last 7 days   Lab Units 07/10/22  1514   PROTIME seconds 13 0   INR  1 03   PTT seconds 32     Results from last 7 days   Lab Units 07/10/22  1514   PROCALCITONIN ng/ml 0 20     Results from last 7 days   Lab Units 07/10/22  1514   LACTIC ACID mmol/L 0 9     Results from last 7 days   Lab Units 07/10/22  1214   CLARITY UA  Slightly Cloudy   COLOR UA  Yellow   SPEC GRAV UA  1 025   PH UA  6 0   GLUCOSE UA mg/dl Negative   KETONES UA mg/dl Negative   BLOOD UA  Moderate*   PROTEIN UA mg/dl 30 (1+)*   NITRITE UA  Positive*   BILIRUBIN UA  Negative   UROBILINOGEN UA E U /dl 0 2   LEUKOCYTES UA  Moderate*   WBC UA /hpf 30-50*   RBC UA /hpf 20-30*   BACTERIA UA /hpf Occasional   EPITHELIAL CELLS WET PREP /hpf Occasional   MUCUS THREADS  Occasional*     Results from last 7 days   Lab Units 07/10/22  1556   INFLUENZA A PCR  Negative   INFLUENZA B PCR  Negative   RSV PCR  Negative       Results from last 7 days   Lab Units 07/10/22  1514   BLOOD CULTURE  Received in Microbiology Lab  Culture in Progress  Received in Microbiology Lab  Culture in Progress           ED Treatment:   Medication Administration from 07/10/2022 1023 to 07/11/2022 0843       Date/Time Order Dose Route Action     07/10/2022 1520 sodium chloride 0 9 % bolus 1,000 mL 1,000 mL Intravenous New Bag     07/10/2022 1520 ketorolac (TORADOL) injection 15 mg 15 mg Intravenous Given     07/10/2022 1520 acetaminophen (TYLENOL) tablet 975 mg 975 mg Oral Given     07/10/2022 1556 cefepime (MAXIPIME) 2 g/50 mL dextrose IVPB 2,000 mg Intravenous New Bag     07/10/2022 1740 phenazopyridine (PYRIDIUM) tablet 200 mg 200 mg Oral Given     07/10/2022 1741 sodium chloride 0 9 % bolus 1,000 mL 1,000 mL Intravenous New Bag     07/10/2022 2336 ceftriaxone (ROCEPHIN) 1 g/50 mL in dextrose IVPB 1,000 mg Intravenous New Bag     07/10/2022 1921 lactated ringers infusion 125 mL/hr Intravenous New Bag        Past Medical History:   Diagnosis Date    Enlarged prostate     Hyperlipidemia     Hypertension     Neuropathy      Present on Admission:   HTN (hypertension)   BPH (benign prostatic hyperplasia)   Sepsis (HCC)   Urinary tract infection      Admitting Diagnosis: Difficulty urinating [R39 [id number]]  Age/Sex: 46 y o  male  Admission Orders:  Scheduled Medications:  amLODIPine, 5 mg, Oral, Daily  cefTRIAXone, 1,000 mg, Intravenous, Q24H  enoxaparin, 40 mg, Subcutaneous, Q24H CHANDRIKA  metoprolol succinate, 25 mg, Oral, Daily      Continuous IV Infusions:  lactated ringers, 125 mL/hr, Intravenous, Continuous      PRN Meds:  acetaminophen, 650 mg, Oral, Q6H PRN  ondansetron, 4 mg, Intravenous, Q6H PRN    PT OT eval   amb   Reg diet   Up and OOB       Network Utilization Review Department  ATTENTION: Please call with any questions or concerns to [phone] and carefully listen to the prompts so that you are directed to the right person  All voicemails are confidential   Shefali Rangel all requests for admission clinical reviews, approved or denied determinations and any other requests to dedicated fax number below belonging to the campus where the patient is receiving treatment   List of dedicated fax numbers for the Facilities:  FACILITY NAME [facility] 25 DENIALS (Administrative/Medical Necessity) [phone] [address] (Maternity/NICU/Pediatrics) 261 [facility] Floor [facility] 40 125 [facility]  [phone]   [address] [facility] [place] [address] [id number] [address] [place] [place] [address] [phone]

## 2022-07-11 NOTE — ASSESSMENT & PLAN NOTE
· Present on admission with fever, tachycardia, leukocytosis suspected in setting of UTI  · UA micro positive nitrates leukocytosis noted wbc's 30-50 with occasional bacteria  · Urine culture pending  · Blood cultures x2 pending  · Discontinue IV fluids  · Leukocytosis improved, fever resolved tachycardia resolved  · Currently on IV ceftriaxone can transition to oral Keflex 500 mg q 6 hours for 10 days upon discharge  · Ambulatory referral to Urology message also sent to patient's urologist

## 2022-07-11 NOTE — PLAN OF CARE
Problem: Potential for Falls  Goal: Patient will remain free of falls  Description: INTERVENTIONS:  - Educate patient/family on patient safety including physical limitations  - Instruct patient to call for assistance with activity   - Consult OT/PT to assist with strengthening/mobility   - Keep Call bell within reach  - Keep bed low and locked with side rails adjusted as appropriate  - Keep care items and personal belongings within reach  - Initiate and maintain comfort rounds  - Make Fall Risk Sign visible to staff  - Offer Toileting every 2 Hours, in advance of need  - Initiate/Maintain bed alarm  - Obtain necessary fall risk management equipment:   - Apply yellow socks and bracelet for high fall risk patients  - Consider moving patient to room near nurses station  Outcome: Progressing     Problem: GENITOURINARY - ADULT  Goal: Maintains or returns to baseline urinary function  Description: INTERVENTIONS:  - Assess urinary function  - Encourage oral fluids to ensure adequate hydration if ordered  - Administer IV fluids as ordered to ensure adequate hydration  - Administer ordered medications as needed  - Offer frequent toileting  - Follow urinary retention protocol if ordered  Outcome: Progressing  Goal: Absence of urinary retention  Description: INTERVENTIONS:  - Assess patients ability to void and empty bladder  - Monitor I/O  - Bladder scan as needed  - Discuss with physician/AP medications to alleviate retention as needed  - Discuss catheterization for long term situations as appropriate  Outcome: Progressing     Problem: PAIN - ADULT  Goal: Verbalizes/displays adequate comfort level or baseline comfort level  Description: Interventions:  - Encourage patient to monitor pain and request assistance  - Assess pain using appropriate pain scale  - Administer analgesics based on type and severity of pain and evaluate response  - Implement non-pharmacological measures as appropriate and evaluate response  - Consider cultural and social influences on pain and pain management  - Notify physician/advanced practitioner if interventions unsuccessful or patient reports new pain  Outcome: Progressing     Problem: INFECTION - ADULT  Goal: Absence or prevention of progression during hospitalization  Description: INTERVENTIONS:  - Assess and monitor for signs and symptoms of infection  - Monitor lab/diagnostic results  - Monitor all insertion sites, i e  indwelling lines, tubes, and drains  - Monitor endotracheal if appropriate and nasal secretions for changes in amount and color  - [place] appropriate cooling/warming therapies per order  - Administer medications as ordered  - Instruct and encourage patient and family to use good hand hygiene technique  - Identify and instruct in appropriate isolation precautions for identified infection/condition  Outcome: Progressing     Problem: SAFETY ADULT  Goal: Patient will remain free of falls  Description: INTERVENTIONS:  - Educate patient/family on patient safety including physical limitations  - Instruct patient to call for assistance with activity   - Consult OT/PT to assist with strengthening/mobility   - Keep Call bell within reach  - Keep bed low and locked with side rails adjusted as appropriate  - Keep care items and personal belongings within reach  - Initiate and maintain comfort rounds  - Make Fall Risk Sign visible to staff  - Offer Toileting every 2 Hours, in advance of need  - Initiate/Maintain bed alarm  - Obtain necessary fall risk management equipment:   - Apply yellow socks and bracelet for high fall risk patients  - Consider moving patient to room near nurses station  Outcome: Progressing  Goal: Maintain or return to baseline ADL function  Description: INTERVENTIONS:  -  Assess patient's ability to carry out ADLs; assess patient's baseline for ADL function and identify physical deficits which impact ability to perform ADLs (bathing, care of mouth/teeth, toileting, grooming, dressing, etc )  - Assess/evaluate cause of self-care deficits   - Assess range of motion  - Assess patient's mobility; develop plan if impaired  - Assess patient's need for assistive devices and provide as appropriate  - Encourage maximum independence but intervene and supervise when necessary  - Involve family in performance of ADLs  - Assess for home care needs following discharge   - Consider OT consult to assist with ADL evaluation and planning for discharge  - Provide patient education as appropriate  Outcome: Progressing  Goal: Maintains/Returns to pre admission functional level  Description: INTERVENTIONS:  - Perform BMAT or MOVE assessment daily    - Set and communicate daily mobility goal to care team and patient/family/caregiver  - Collaborate with rehabilitation services on mobility goals if consulted  - Perform Range of Motion 3 times a day  - Reposition patient every 2 hours    - Dangle patient 3 times a day  - Stand patient 3 times a day  - Ambulate patient 3 times a day  - Out of bed to chair 3 times a day   - Out of bed for meals 3 times a day  - Out of bed for toileting  - Record patient progress and toleration of activity level   Outcome: Progressing     Problem: DISCHARGE PLANNING  Goal: Discharge to home or other facility with appropriate resources  Description: INTERVENTIONS:  - Identify barriers to discharge w/patient and caregiver  - Arrange for needed discharge resources and transportation as appropriate  - Identify discharge learning needs (meds, wound care, etc )  - Arrange for interpretive services to assist at discharge as needed  - Refer to Case Management Department for coordinating discharge planning if the patient needs post-hospital services based on physician/advanced practitioner order or complex needs related to functional status, cognitive ability, or social support system  Outcome: Progressing     Problem: Knowledge Deficit  Goal: Patient/family/caregiver demonstrates understanding of disease process, treatment plan, medications, and discharge instructions  Description: Complete learning assessment and assess knowledge base    Interventions:  - Provide teaching at level of understanding  - Provide teaching via preferred learning methods  Outcome: Progressing

## 2022-07-11 NOTE — PHYSICAL THERAPY NOTE
PHYSICAL THERAPY EVALUATION  NAME:  Fernando Eng  DATE: 07/11/22    AGE:   46 y o  Mrn:   [id number]  ADMIT DX:  Difficulty urinating [R39 [id number]]  Problem List:   Patient Active Problem List   Diagnosis    Prostatic disorder    HTN (hypertension)    BPH (benign prostatic hyperplasia)    Hyperlipidemia    Obesity    Complex tear of medial meniscus of left knee    Osteoarthritis of left knee    Sepsis ([address] )    Urinary tract infection       Past Medical History  Past Medical History:   Diagnosis Date    Enlarged prostate     Hyperlipidemia     Hypertension     Neuropathy        Past Surgical History  Past Surgical History:   Procedure Laterality Date    KNEE ARTHROSCOPY Right     [facility] CYSTOSCOPY,DIR VIS INT URETHROTOMY N/A 10/23/2020    Procedure: DIRECT VISUAL INTERAL URETHROTOMY (DVIU); Surgeon: Alisha Banks MD;  [facility];  Service: Urology    VA CYSTOURETHROSCOPY N/A 10/23/2020    Procedure: Yahaira Mixer;  Surgeon: Alisha Banks MD;  [facility];  Service: Urology    VA KNEE [facility] Left 6/2/2022    Procedure: Left knee arthroscopy  partial Medial menisectomy  Condroplasty ;  Surgeon: Beryle Scarpa, MD;  Location: [facility];  Service: Orthopedics    TRANSURETHRAL RESECTION OF PROSTATE         Length Of Stay: 0  Performed at least 2 patient identifiers during session: Name and Birthday       07/11/22 0818   PT Last Visit   PT Visit Date 07/11/22   Note Type   Note type Evaluation   Additional Comments AFTAB Pritchett confirmed pt appropriate for evaluation   Pain Assessment   Pain Assessment Tool 0-10   Pain Score 2   Pain Location/Orientation Orientation: Lower; Location: Abdomen   Restrictions/Precautions   Weight Bearing Precautions Per Order No   Braces or Orthoses Other (Comment)  (none per pt)   Other Precautions Pain   Home Living   Type of [address] [place] [address] One level   Bathroom Shower/Tub Tub/shower unit   Bathroom Toilet Standard   Bathroom Equipment Other (Comment)  (none per patient)   [address] Other (Comment)  (pt reports no equipment at home)   Additional Comments Pt states prior to admission was not using AD for mobility   Prior Function   Level of [place] Independent with ADLs and functional mobility   Receives Help From Other (Comment)  (self)   ADL Assistance Independent   IADLs Independent   Falls in the last 6 months 0   Vocational Full time employment   General   Family/Caregiver Present No   Cognition   Overall Cognitive Status WFL   Arousal/Participation Alert   Memory Within functional limits   Following Commands Follows all commands and directions without difficulty   Comments pt agreed to PT evaluation   Subjective   Subjective "It feels better today"   RLE Assessment   RLE Assessment WNL  (RLE ROM WNL; strength 5/5)   LLE Assessment   LLE Assessment WFL  (Formal MMT L knee not completed  LLE ROM appears WFL and non-painful  No TTP)   Vision-Basic Assessment   Current Vision Does not wear glasses   Coordination   Sensation WFL   Light Touch   RLE Light Touch Grossly intact   LLE Light Touch Grossly intact   Bed Mobility   Additional Comments Not assesed as pt seated in bedside chair at start of session   Transfers   Sit to Stand 6  Modified independent   Additional items Armrests   Stand to Sit 6  Modified independent   Additional items Armrests   Stand pivot 7  Independent   Additional Comments No AD used   No reports of dizziness, pain, lightheadedness   Ambulation/Elevation   Gait pattern WNL   Gait Assistance 7  Independent   Assistive Device None   Distance 120   Balance   Static Sitting Normal   Dynamic Sitting Normal   Static Standing Normal   Dynamic Standing Good   Ambulatory Good   Endurance Deficit   Endurance Deficit No   Activity Tolerance   Activity Tolerance Patient tolerated treatment well   Assessment   Assessment Pt is 46 y o  male seen for PT evaluation s/p [facility]  Favio's [place] on 7/10/2022 w/ Sepsis ([address] )  PT consulted to assess pt's functional mobility and d/c needs  Order placed for PT eval and tx, w/ up and OOB as tolerated and ambulate patient order  Comorbidities affecting pt's physical performance at time of assessment include: UTI, HTN, L knee arthroscopy/medial menisectomy and chondroplasty 6/2/2022  Per orthopedic note from 6/15/22 pt "can return to work without restrictions 6/29/2022," and was previously given physical therapy script however declined services  Patient reports he has since returned to work and has not noted any major difficulties related to his L knee since starting back and currently reports 0/10 pain  PTA pt was independent with all ADLs, IADLs, and mobility without AD and has returned to work full time; unclear nature of patients job, but he describes it as "sometimes a lot of physical work " Gross ROM and strength assesment indicate RLE ROM WNL and strength 5/5, formal L knee MMT not completed at this time; however, pt appears with ROM WFL and non painful with no tenderness to palpation and incision appears intact  Patient performed STS and ambulation Tiffany without AD and without notable gait deviations this date  The following objective measures performed on IE: AM-PAC 6-Clicks: 75/27  Pt's clinical presentation is currently stable  seen in pt's presentation of appearing with performance of mobility at baseline  From PT/mobility standpoint, pt appears to be functioning close to or at mobility baseline, therefore no further immediate skilled PT needs are warranted at this time  Pt currently performing all phases of functional mobility at independent level without need for AD  Recommend pt continue to mobilize ad padmini while here  Recommend pt return to previous living environment once medically cleared  Discussed outpatient physical therapy services relating to L knee; patient did not appear as if he would be interested  Will sign off, D/C PT  Please reconsult if further immediate skilled PT needs are warranted  Barriers to Discharge None   Goals   Patient Goals "To feel better"   Short Term Goal #1 N/A - PT to sign off   PT Treatment Day 0   Plan   Treatment/Interventions   (N/A - PT to sign off)   PT Frequency Other (Comment)  (N/A PT to sign off)   Recommendation   PT Discharge Recommendation Home with outpatient rehabilitation   AM-PAC Basic Mobility Inpatient   Turning in Bed Without Bedrails 4   Lying on Back to Sitting on Edge of Flat Bed 4   Moving Bed to Chair 4   Standing Up From Chair 4   Walk in Room 4   Climb 3-5 Stairs 4   Basic Mobility Inpatient Raw Score 24   Basic Mobility Standardized Score 57 68   Highest Level Of Mobility   JH-HLM Goal 8: Walk 250 feet or more   JH-HLM Achieved 7: Walk 25 feet or more   End of Consult   Patient Position at End of Consult Bedside chair; All needs within reach       Time In: 2001 [address]  Time Out: 0828  Total Evaluation Minutes: [address] [place], [place]

## 2022-07-11 NOTE — DISCHARGE SUMMARY
[address] 1970, 46 y o  male MRN: [id number]  Unit/Bed#: ED 19 Encounter: [id number]  Primary Care Provider: Karen Mae MD   Date and time admitted to hospital: 7/10/2022  2:46 PM    * Sepsis [facility])  Assessment & Plan  · Present on admission with fever, tachycardia, leukocytosis suspected in setting of UTI  · UA micro positive nitrates leukocytosis noted wbc's 30-50 with occasional bacteria  · Urine culture pending  · Blood cultures x2 pending  · Discontinue IV fluids  · Leukocytosis improved, fever resolved tachycardia resolved  · Currently on IV ceftriaxone can transition to oral Keflex 500 mg q 6 hours for 10 days upon discharge  · Ambulatory referral to Urology message also sent to patient's urologist    Urinary tract infection  Assessment & Plan  Patient is a 47yo obese male with hx of previous urethral surgery in the past who now presents with dysuria, suprapubic pain and fever  · CT of the abdomen - " Enlarged prostate with presumed urethroplasty changes to the prostatic urethra   The previously noted enhancement which may have represented periurethral/prostatic inflammation are not evaluated on noncontrast exam  Normal bladder   No acute findings in the abdomen or pelvis   " Clinically no signs of prostatitis, denies any rectal pain or tenesmus or perineal pain  · No reported history of resistance as per patient, patient started on ceftriaxone  · Urine culture pending  · Monitor clinically, temperature curve, white count, abdominal exam    BPH (benign prostatic hyperplasia)  Assessment & Plan  · Patient does have prostatic enlargement on imaging but denies any tenesmus or rectal pain, low suspicion for prostatitis    Obesity  Assessment & Plan  · BMI of 36 Patient would benefit from weight loss after resolution of acute illness    HTN (hypertension)  Assessment & Plan  · At home he is amlodipine, hydrochlorothiazide, valsartan  · Given sepsis HCTZ and valsartan held to prevent nephrotoxicity,   · Continue amlodipine with parameters  · Routine vitals per unit        Medical Problems             Resolved Problems  Date Reviewed: 7/11/2022   None               Discharging Physician / Practitioner: MICHELLE Thompson  PCP: Stephen Soliz MD  Admission Date:   Admission Orders (From admission, onward)     Ordered        07/10/22 7165  Place in Observation  Once                      Discharge Date: 07/11/22    Consultations During Hospital Stay:  · None    Procedures Performed:   · None    Significant Findings / Test Results:   · Sepsis in setting of UTI patient with recent a urethroplasty  · BPH    Incidental Findings:   ·       Test Results Pending at Discharge (will require follow up): · Blood cultures  · Urine culture     Outpatient Tests Requested:  · Per urology    Complications:  None    Reason for Admission:  Sepsis in setting UTI    Hospital Course:   Benito Lake is a 46 y o  male patient who originally presented to the hospital on 7/10/2022 due to hypertension BPH recently underwent a urethroplasty presenting with dysuria, suprapubic tenderness, fever  Diagnostic studies showing stable enlarged prostate UA micro showing nitrates leukocytes wbc's 30-50 with occasional bacteria patient suspected to have UTI had send fever leukocytosis tachycardia meeting sepsis criteria  Patient was placed on IV ceftriaxone fever resolved patient remained afebrile overnight leukocytosis improved from 20-13 and overall patient felt symptomatically better  Patient given his improvement wanted to transition to oral antibiotics and follow-up with his urologist patient was given a script for Keflex 500 mg q 6 hours for 10 days a message was sent to his urologist along with his PCP patient overall deemed medically stable and was discharged home  Please see above list of diagnoses and related plan for additional information       Condition at Discharge: good    Discharge Day Visit / Exam:   Subjective:  Patient reports minimal to no dysuria mild suprapubic tenderness denies any fever and overall states he feels better patient would prefer to discharge home and follow-up with his urologist as an outpatient and agreeable to the antibiotics  Wife at bedside agrees with plan all questions and concerns were answered  Vitals: Blood Pressure: 160/96 (07/11/22 0705)  Pulse: 79 (07/11/22 0705)  Temperature: 98 9 °F (37 2 °C) (07/10/22 1915)  Temp Source: Oral (07/10/22 1915)  Respirations: 18 (07/11/22 0705)  Height: 5' 4" (162 6 cm) (07/11/22 0705)  Weight - Scale: 95 7 kg (211 lb) (07/11/22 0705)  SpO2: 97 % (07/11/22 0705)  Exam:   Physical Exam  Vitals and nursing note reviewed  Constitutional:       Appearance: He is well-developed  HENT:      Head: Normocephalic and atraumatic  Eyes:      Conjunctiva/sclera: Conjunctivae normal    Cardiovascular:      Rate and Rhythm: Normal rate and regular rhythm  Heart sounds: No murmur heard  Pulmonary:      Effort: Pulmonary effort is normal  No respiratory distress  Breath sounds: Normal breath sounds  Abdominal:      Palpations: Abdomen is soft  Tenderness: There is abdominal tenderness in the suprapubic area  There is no guarding  Comments: Reports minimal tenderness suprapubic   Musculoskeletal:      Cervical back: Neck supple  Skin:     General: Skin is warm and dry  Neurological:      Mental Status: He is alert and oriented to person, place, and time  Mental status is at baseline  Psychiatric:         Mood and Affect: Mood normal          Behavior: Behavior normal           Discussion with Family: Updated  (wife) at bedside  Discharge instructions/Information to patient and family:   See after visit summary for information provided to patient and family        Provisions for Follow-Up Care:  See after visit summary for information related to follow-up care and any pertinent home health orders  Disposition:   Home    Planned Readmission:  None     Discharge Statement:  I spent 35 minutes discharging the patient  This time was spent on the day of discharge  I had direct contact with the patient on the day of discharge  Greater than 50% of the total time was spent examining patient, answering all patient questions, arranging and discussing plan of care with patient as well as directly providing post-discharge instructions  Additional time then spent on discharge activities  Discharge Medications:  See after visit summary for reconciled discharge medications provided to patient and/or family        **Please Note: This note may have been constructed using a voice recognition system**

## 2022-07-11 NOTE — ASSESSMENT & PLAN NOTE
· At home he is amlodipine, hydrochlorothiazide, valsartan  · Given sepsis HCTZ and valsartan held to prevent nephrotoxicity,   · Continue amlodipine with parameters  · Routine vitals per unit

## 2022-07-11 NOTE — ASSESSMENT & PLAN NOTE
Patient is a 45yo obese male with hx of previous urethral surgery in the past who now presents with dysuria, suprapubic pain and fever  · CT of the abdomen - " Enlarged prostate with presumed urethroplasty changes to the prostatic urethra   The previously noted enhancement which may have represented periurethral/prostatic inflammation are not evaluated on noncontrast exam  Normal bladder   No acute findings in the abdomen or pelvis   " Clinically no signs of prostatitis, denies any rectal pain or tenesmus or perineal pain  · No reported history of resistance as per patient, patient started on ceftriaxone  · Urine culture pending  · Monitor clinically, temperature curve, white count, abdominal exam

## 2022-07-12 ENCOUNTER — TELEPHONE (OUTPATIENT)
Dept: INTERNAL MEDICINE CLINIC | Facility: CLINIC | Age: 52
End: 2022-07-12

## 2022-07-12 ENCOUNTER — TRANSITIONAL CARE MANAGEMENT (OUTPATIENT)
Dept: INTERNAL MEDICINE CLINIC | Facility: CLINIC | Age: 52
End: 2022-07-12

## 2022-07-12 NOTE — TELEPHONE ENCOUNTER
He has a bladder infection  [place] [address] goes along with this  Abnormal urine culture  He was given antibiotic is he taking it? Does he feel better?

## 2022-07-12 NOTE — TELEPHONE ENCOUNTER
RUBÉNCB with result message from provider and to find out if he is taking his antibiotics and how he's doing

## 2022-07-13 LAB — BACTERIA UR CULT: ABNORMAL

## 2022-07-13 NOTE — TELEPHONE ENCOUNTER
WIFE CALLED BACK AND WAS NOTIFIED AND HE WAS ADMITTED SUN THRU MON THEN DISCHARGED AND IS FEELING BETTER AND TAKING ABX

## 2022-07-15 LAB
BACTERIA BLD CULT: NORMAL
BACTERIA BLD CULT: NORMAL

## 2022-07-26 ENCOUNTER — TELEPHONE (OUTPATIENT)
Dept: UROLOGY | Facility: AMBULATORY SURGERY CENTER | Age: 52
End: 2022-07-26

## 2022-07-26 NOTE — TELEPHONE ENCOUNTER
Patient's wife called to request a sooner appointment with Dr Omi Hardy for her  or to combine Aug  And Sept  Appointments since he was seen in the ER again on 7/10 for Sepsis and UTI      Patients wife could be reached at [phone]

## 2022-07-27 NOTE — TELEPHONE ENCOUNTER
Patient no showed original appt with Dr Riley Manzo  Per last encounter with Dr Riley Manzo "If he is still having urinary symptoms, he can be scheduled with the [facility] physician in [facility]"    Currently scheduled 8/16 with [facility] in [facility] which is in approximately 3 weeks  If they want to be seen sooner, Dr Queen Crockett schedule can be reviewed and they could potentially be placed for an appt with him

## 2022-07-27 NOTE — TELEPHONE ENCOUNTER
There are no appts with our locum  except September 16th at 1 PM- this is a month later, do not see availability w/[facility] for a sooner appt either that can combine the two appointments - please advise

## 2022-07-28 NOTE — TELEPHONE ENCOUNTER
L/m for pts wife Pamela Doty- advised her that there are no sooner appointments with Dr Allison Armstrong to combine the two appts he has with him  We do have an availability for Sept 16th at 1 with Dr Sandie Cervantes for both appointments in [facility] ,  not sooner   Asked she call back to advise us which appts she would like - also can be placed on wait list

## 2022-08-15 PROBLEM — N99.114 POSTPROCEDURAL STRICTURE OF ANTERIOR URETHRA: Status: ACTIVE | Noted: 2022-08-15

## 2022-08-16 ENCOUNTER — TELEPHONE (OUTPATIENT)
Dept: UROLOGY | Facility: CLINIC | Age: 52
End: 2022-08-16

## 2022-08-29 ENCOUNTER — VBI (OUTPATIENT)
Dept: ADMINISTRATIVE | Facility: OTHER | Age: 52
End: 2022-08-29

## 2022-09-06 ENCOUNTER — TELEPHONE (OUTPATIENT)
Dept: NEPHROLOGY | Facility: CLINIC | Age: 52
End: 2022-09-06

## 2022-09-06 NOTE — TELEPHONE ENCOUNTER
Received a called from Chalino from the [facility] pharmacy in [place] calling  the office in regards of patient's medications  Chalino stated this patient is non compliance  Chalino stated this patient is seen Dr Karen Murray  I could not find patient in our data base at all      [facility] pharmacy number is [phone]

## 2022-09-13 NOTE — TELEPHONE ENCOUNTER
Called patient and informed of site [address]) & provider change (Dr Liz Connolly)  and that his appt is for 10/6 @ 10 am    Patient  Rachel Memos and thank you for calling

## 2022-09-22 ENCOUNTER — TELEPHONE (OUTPATIENT)
Dept: UROLOGY | Facility: AMBULATORY SURGERY CENTER | Age: 52
End: 2022-09-22

## 2022-09-22 NOTE — TELEPHONE ENCOUNTER
Patient's wife called  She was upset that the appointment with Dr Sabas Leija had been changed to another doctor and she doesn't want the patient to see anyone but Sabas Leija  She did schedule for December but she stated it was an emergency and only Dr Sabas Leija knew the case and wants to be seen sooner  She states that she was not told about the reschedule with the other provider  Wife was very rude       Pt's wife can be reached at [phone]

## 2022-09-22 NOTE — TELEPHONE ENCOUNTER
Patient walked into office today expecting to have an appt with Dr Walt Rashid  Patient was giving Elaina Mitten at the  a hard time and nursing went out to speak with patient  Informed him it is documented in his chart that Uli spoke with him on 9/13/22 informing him of his appt being rescheduled to 10/6/22 @ [address] [facility] office with Dr Megan Goldstein  Patient states he lost a day of work today  Advised there is not anything we can do about that since he agreed to the change in appt  Reassured patient that Dr Megan Goldstein will be able to care for him just as well as Dr Walt Rashid    Patient verbalized understanding and walked out

## 2022-10-03 NOTE — TELEPHONE ENCOUNTER
Patient's wife called in  She stated she was on the phone with someone and the call dropped  She wanted to make a new appointment for her  to be seen sooner  She rescheduled her 's appointment to this Friday 10/7 with Dr Sanjay Eagle at [place]  Patient's wife is aware of the provider change, location change and date change and approved of it

## 2022-10-07 ENCOUNTER — PROCEDURE VISIT (OUTPATIENT)
Dept: UROLOGY | Facility: AMBULATORY SURGERY CENTER | Age: 52
End: 2022-10-07
Payer: COMMERCIAL

## 2022-10-07 VITALS
BODY MASS INDEX: 36.02 KG/M2 | WEIGHT: 211 LBS | RESPIRATION RATE: 18 BRPM | HEART RATE: 91 BPM | OXYGEN SATURATION: 96 % | HEIGHT: 64 IN | SYSTOLIC BLOOD PRESSURE: 168 MMHG | DIASTOLIC BLOOD PRESSURE: 90 MMHG

## 2022-10-07 DIAGNOSIS — R39.82 CHRONIC BLADDER PAIN: Primary | ICD-10-CM

## 2022-10-07 DIAGNOSIS — N40.0 BENIGN PROSTATIC HYPERPLASIA, UNSPECIFIED WHETHER LOWER URINARY TRACT SYMPTOMS PRESENT: ICD-10-CM

## 2022-10-07 DIAGNOSIS — N99.114 POSTPROCEDURAL STRICTURE OF ANTERIOR URETHRA: ICD-10-CM

## 2022-10-07 LAB
POST-VOID RESIDUAL VOLUME, ML POC: 12 ML
SL AMB  POCT GLUCOSE, UA: NORMAL
SL AMB LEUKOCYTE ESTERASE,UA: NORMAL
SL AMB POCT BILIRUBIN,UA: NORMAL
SL AMB POCT BLOOD,UA: NORMAL
SL AMB POCT CLARITY,UA: CLEAR
SL AMB POCT COLOR,UA: YELLOW
SL AMB POCT KETONES,UA: NORMAL
SL AMB POCT NITRITE,UA: NORMAL
SL AMB POCT PH,UA: 6
SL AMB POCT SPECIFIC GRAVITY,UA: 1.01
SL AMB POCT URINE PROTEIN: NORMAL
SL AMB POCT UROBILINOGEN: 0.2

## 2022-10-07 PROCEDURE — 51798 US URINE CAPACITY MEASURE: CPT | Performed by: UROLOGY

## 2022-10-07 PROCEDURE — 52000 CYSTOURETHROSCOPY: CPT | Performed by: UROLOGY

## 2022-10-07 PROCEDURE — 81002 URINALYSIS NONAUTO W/O SCOPE: CPT | Performed by: UROLOGY

## 2022-10-07 RX ORDER — PHENAZOPYRIDINE HYDROCHLORIDE 200 MG/1
200 TABLET, FILM COATED ORAL
Qty: 10 TABLET | Refills: 5 | Status: SHIPPED | OUTPATIENT
Start: 2022-10-07

## 2022-10-07 NOTE — ASSESSMENT & PLAN NOTE
Patient appears to have healed well from his urethroplasty with buccal mucosal grafting as his urethra appears patent now 2 years out from surgery

## 2022-10-07 NOTE — PROGRESS NOTES
Assessment/Plan:    Postprocedural stricture of anterior urethra  Patient appears to have healed well from his urethroplasty with buccal mucosal grafting as his urethra appears patent now 2 years out from surgery  Chronic bladder pain  I do not know what the etiology of his bladder pain is as he appears to have a fairly normal urethral tract based on cystoscopy today  CT scan also did not reveal any abnormality  He is also emptying his bladder well and his urine appears bland on dipstick testing  I provided reassurance I do not think anything significant is going on  I advised taking Tylenol or Motrin when his symptoms flare and short course of Pyridium for which I have written a prescription          Subjective:      Patient ID: Chyna Escobar is a 46 y o  male  HPI  Chyna Escobar is a 46 y o  male patient with history of HoLEP with subsequent urethral stricture status post urethroplasty by Dr Huy Watson  Also history of dysuria, bilateral scrotal discomfort here for ER follow up       The patient had a HoLEP at [facility] in June 2020  Two weeks later he developed clot retention and went to the emergency room and could only get a small catheter in  He was found to have a stricture which was subsequently dilated in August and also September 2020 at [facility]  He then came to [facility] and had cystoscopy and stricture dilation in October 2020 by Dr Haris Ontiveros  He was subsequently referred to see Dr Huy Watson and underwent urethroplasty with dorsal onlay for what was a 5 cm dense pendulous and bulbar urethral stricture  Patient was seen in the emergency department 3/20/22 for ongoing bladder pain and was given antibiotics for presumed cystitis  At the time he also reported bilateral testicular discomfort resulting in scrotal US which showed wedge shaped area of hypoechogenicity in the testicles   CT during same workup showing dilation of the urethra into the prostatic segment       The patient reports today that he continues to have what appears to be bladder pain  This comes and goes and does not seem to be directly related to voiding  Denies fevers or chills or difficulty emptying his bladder  He denies testicular pain  Cystoscopy today showed evidence of prior urethral stricture repair but overall patent urethra and unremarkable bladder  Urine dip was bland  PVR was 13 cc  Past Surgical History:   Procedure Laterality Date    KNEE ARTHROSCOPY Right     ME CYSTOSCOPY,DIR VIS INT URETHROTOMY N/A 10/23/2020    Procedure: DIRECT VISUAL INTERAL URETHROTOMY (DVIU); Surgeon: Corby Coombs MD;  [facility];  Service: Urology    ME CYSTOURETHROSCOPY N/A 10/23/2020    Procedure: Angelito Sotelo;  Surgeon: Corby Coombs MD;  [facility];  Service: Urology    [facility] Left 6/2/2022    Procedure: Left knee arthroscopy  partial Medial menisectomy   Condroplasty ;  Surgeon: Jagdish Shepard MD;  [facility]: [facility];  Service: Orthopedics    TRANSURETHRAL RESECTION OF PROSTATE          Past Medical History:   Diagnosis Date    Enlarged prostate     Hyperlipidemia     Hypertension     Neuropathy         AUA SYMPTOM SCORE    Flowsheet Row Most Recent Value   AUA SYMPTOM SCORE    How often have you had a sensation of not emptying your bladder completely after you finished urinating? 3 (P)     How often have you had to urinate again less than two hours after you finished urinating? 3 (P)     How often have you found you stopped and started again several times when you urinate? 3 (P)     How often have you found it difficult to postpone urination? 4 (P)     How often have you had a weak urinary stream? 2 (P)     How often have you had to push or strain to begin urination? 3 (P)     How many times did you most typically get up to urinate from the time you went to bed at night until the time you got up in the morning? 3 (P)     Quality of Life: If you were to spend the rest of your life with your urinary condition just the way it is now, how would you feel about that? 4 (P)     AUA SYMPTOM SCORE 21 (P)            Review of Systems   Constitutional: Negative for chills and fever  HENT: Negative for ear pain and sore throat  Eyes: Negative for pain and visual disturbance  Respiratory: Negative for cough and shortness of breath  Cardiovascular: Negative for chest pain and palpitations  Gastrointestinal: Negative for abdominal pain and vomiting  Genitourinary: Negative for dysuria and hematuria  Musculoskeletal: Negative for arthralgias and back pain  Skin: Negative for color change and rash  Neurological: Negative for seizures and syncope  All other systems reviewed and are negative  Objective:      /90 (BP Location: Right arm, Patient Position: Sitting, Cuff Size: Standard)   Pulse 91   Resp 18   Ht 5' 4" (1 626 m)   Wt 95 7 kg (211 lb)   SpO2 96%   BMI 36 22 kg/m²     No results found for: PSA       Physical Exam  Vitals reviewed  Constitutional:       Appearance: Normal appearance  He is normal weight  HENT:      Head: Normocephalic and atraumatic  Eyes:      Pupils: Pupils are equal, round, and reactive to light  Abdominal:      General: Abdomen is flat  Neurological:      General: No focal deficit present  Mental Status: He is alert and oriented to person, place, and time  Psychiatric:         Mood and Affect: Mood normal          Thought Content: Thought content normal                 Cystoscopy     Date/Time 10/7/2022 4:56 PM     Performed by  Gee Luz MD     Authorized by Gee Luz MD      Universal Protocol:  Consent: Written consent obtained    Risks and benefits: risks, benefits and alternatives were discussed  Consent given by: patient  Time out: Immediately prior to procedure a "time out" was called to verify the correct patient, procedure, equipment, support staff and site/side marked as required  Patient understanding: patient states understanding of the procedure being performed  Patient consent: the patient's understanding of the procedure matches consent given  Procedure consent: procedure consent matches procedure scheduled  Patient identity confirmed: verbally with patient        Procedure Details:  Procedure type: cystoscopy    Patient tolerance: Patient tolerated the procedure well with no immediate complications    Additional Procedure Details: A time-out was performed identifying the correct patient site and procedure  A MA chaperone was in the room  A flexible cystoscope was introduced into the urethra  The pendulous urethraThe prost surgery with mildly tapered urethra with right filling but able to pass scope through without difficulty  The prostatic urethra is open consistent with prior outlet surgery  The bladder did not have any lesions concerning for malignancy  There were mild trabeculations and no diverticula  The ureteral orifices were in orthotopic position        Orders  Orders Placed This Encounter   Procedures    Cystoscopy     This order was created via procedure documentation    POCT urine dip    POCT Measure PVR

## 2022-10-07 NOTE — ASSESSMENT & PLAN NOTE
I do not know what the etiology of his bladder pain is as he appears to have a fairly normal urethral tract based on cystoscopy today  CT scan also did not reveal any abnormality  He is also emptying his bladder well and his urine appears bland on dipstick testing  I provided reassurance I do not think anything significant is going on    I advised taking Tylenol or Motrin when his symptoms flare and short course of Pyridium for which I have written a prescription

## 2022-10-11 PROBLEM — N39.0 URINARY TRACT INFECTION: Status: RESOLVED | Noted: 2022-07-10 | Resolved: 2022-10-11

## 2022-10-11 PROBLEM — A41.9 SEPSIS (HCC): Status: RESOLVED | Noted: 2022-07-10 | Resolved: 2022-10-11

## 2023-02-15 ENCOUNTER — TELEPHONE (OUTPATIENT)
Dept: INTERNAL MEDICINE CLINIC | Facility: CLINIC | Age: 53
End: 2023-02-15

## 2023-02-15 DIAGNOSIS — I10 PRIMARY HYPERTENSION: ICD-10-CM

## 2023-02-15 RX ORDER — METOPROLOL SUCCINATE 25 MG/1
25 TABLET, EXTENDED RELEASE ORAL DAILY
Qty: 30 TABLET | Refills: 0 | Status: SHIPPED | OUTPATIENT
Start: 2023-02-15

## 2023-02-20 ENCOUNTER — VBI (OUTPATIENT)
Dept: ADMINISTRATIVE | Facility: OTHER | Age: 53
End: 2023-02-20

## 2023-03-06 ENCOUNTER — APPOINTMENT (OUTPATIENT)
Dept: RADIOLOGY | Facility: HOSPITAL | Age: 53
End: 2023-03-06

## 2023-03-06 ENCOUNTER — HOSPITAL ENCOUNTER (OUTPATIENT)
Facility: HOSPITAL | Age: 53
Setting detail: OBSERVATION
Discharge: HOME | End: 2023-03-08
Attending: EMERGENCY MEDICINE | Admitting: INTERNAL MEDICINE

## 2023-03-06 DIAGNOSIS — I10 HIGH BLOOD PRESSURE: Primary | ICD-10-CM

## 2023-03-06 DIAGNOSIS — I10 PRIMARY HYPERTENSION: ICD-10-CM

## 2023-03-06 DIAGNOSIS — E78.2 MIXED HYPERLIPIDEMIA: ICD-10-CM

## 2023-03-06 DIAGNOSIS — R07.9 CHEST PAIN, UNSPECIFIED TYPE: ICD-10-CM

## 2023-03-06 LAB
2HR DELTA HS TROPONIN: -14 NG/L
ANION GAP SERPL CALCULATED.3IONS-SCNC: 5 MMOL/L (ref 4–13)
BASOPHILS # BLD AUTO: 0.08 THOUSANDS/ÂΜL (ref 0–0.1)
BASOPHILS NFR BLD AUTO: 1 % (ref 0–1)
BUN SERPL-MCNC: 13 MG/DL (ref 5–25)
CALCIUM SERPL-MCNC: 9.1 MG/DL (ref 8.4–10.2)
CARDIAC TROPONIN I PNL SERPL HS: 71 NG/L
CARDIAC TROPONIN I PNL SERPL HS: 85 NG/L
CHLORIDE SERPL-SCNC: 104 MMOL/L (ref 96–108)
CO2 SERPL-SCNC: 30 MMOL/L (ref 21–32)
CREAT SERPL-MCNC: 0.82 MG/DL (ref 0.6–1.3)
EOSINOPHIL # BLD AUTO: 0.48 THOUSAND/ÂΜL (ref 0–0.61)
EOSINOPHIL NFR BLD AUTO: 5 % (ref 0–6)
ERYTHROCYTE [DISTWIDTH] IN BLOOD BY AUTOMATED COUNT: 12.7 % (ref 11.6–15.1)
GFR SERPL CREATININE-BSD FRML MDRD: 100 ML/MIN/1.73SQ M
GLUCOSE SERPL-MCNC: 95 MG/DL (ref 65–140)
HCT VFR BLD AUTO: 44.6 % (ref 36.5–49.3)
HGB BLD-MCNC: 15.4 G/DL (ref 12–17)
IMM GRANULOCYTES # BLD AUTO: 0.04 THOUSAND/UL (ref 0–0.2)
IMM GRANULOCYTES NFR BLD AUTO: 0 % (ref 0–2)
LYMPHOCYTES # BLD AUTO: 3.05 THOUSANDS/ÂΜL (ref 0.6–4.47)
LYMPHOCYTES NFR BLD AUTO: 33 % (ref 14–44)
MCH RBC QN AUTO: 29.6 PG (ref 26.8–34.3)
MCHC RBC AUTO-ENTMCNC: 34.5 G/DL (ref 31.4–37.4)
MCV RBC AUTO: 86 FL (ref 82–98)
MONOCYTES # BLD AUTO: 0.75 THOUSAND/ÂΜL (ref 0.17–1.22)
MONOCYTES NFR BLD AUTO: 8 % (ref 4–12)
NEUTROPHILS # BLD AUTO: 4.75 THOUSANDS/ÂΜL (ref 1.85–7.62)
NEUTS SEG NFR BLD AUTO: 53 % (ref 43–75)
NRBC BLD AUTO-RTO: 0 /100 WBCS
PLATELET # BLD AUTO: 206 THOUSANDS/UL (ref 149–390)
PMV BLD AUTO: 10.5 FL (ref 8.9–12.7)
POTASSIUM SERPL-SCNC: 4.1 MMOL/L (ref 3.5–5.3)
RBC # BLD AUTO: 5.21 MILLION/UL (ref 3.88–5.62)
SODIUM SERPL-SCNC: 139 MMOL/L (ref 135–147)
WBC # BLD AUTO: 9.15 THOUSAND/UL (ref 4.31–10.16)

## 2023-03-06 RX ORDER — LOSARTAN POTASSIUM 50 MG/1
100 TABLET ORAL DAILY
Status: DISCONTINUED | OUTPATIENT
Start: 2023-03-07 | End: 2023-03-08 | Stop reason: HOSPADM

## 2023-03-06 RX ORDER — HYDROCHLOROTHIAZIDE 12.5 MG/1
12.5 TABLET ORAL DAILY
Status: DISCONTINUED | OUTPATIENT
Start: 2023-03-07 | End: 2023-03-08 | Stop reason: HOSPADM

## 2023-03-06 RX ORDER — ASPIRIN 81 MG/1
81 TABLET ORAL DAILY
Status: DISCONTINUED | OUTPATIENT
Start: 2023-03-07 | End: 2023-03-08 | Stop reason: HOSPADM

## 2023-03-06 RX ORDER — ASPIRIN 325 MG
325 TABLET ORAL ONCE
Status: COMPLETED | OUTPATIENT
Start: 2023-03-06 | End: 2023-03-06

## 2023-03-06 RX ORDER — AMLODIPINE BESYLATE 5 MG/1
5 TABLET ORAL DAILY
Status: DISCONTINUED | OUTPATIENT
Start: 2023-03-07 | End: 2023-03-08 | Stop reason: HOSPADM

## 2023-03-06 RX ORDER — LABETALOL HYDROCHLORIDE 5 MG/ML
10 INJECTION, SOLUTION INTRAVENOUS ONCE
Status: COMPLETED | OUTPATIENT
Start: 2023-03-06 | End: 2023-03-06

## 2023-03-06 RX ORDER — METOPROLOL SUCCINATE 25 MG/1
25 TABLET, EXTENDED RELEASE ORAL DAILY
Status: DISCONTINUED | OUTPATIENT
Start: 2023-03-07 | End: 2023-03-08 | Stop reason: HOSPADM

## 2023-03-06 RX ORDER — ACETAMINOPHEN 325 MG/1
650 TABLET ORAL EVERY 6 HOURS PRN
Status: DISCONTINUED | OUTPATIENT
Start: 2023-03-06 | End: 2023-03-08 | Stop reason: HOSPADM

## 2023-03-06 RX ADMIN — NITROGLYCERIN 1 INCH: 20 OINTMENT TOPICAL at 21:25

## 2023-03-06 RX ADMIN — LABETALOL HYDROCHLORIDE 10 MG: 5 INJECTION, SOLUTION INTRAVENOUS at 20:07

## 2023-03-06 RX ADMIN — ACETAMINOPHEN 650 MG: 325 TABLET, FILM COATED ORAL at 23:51

## 2023-03-06 RX ADMIN — ASPIRIN 325 MG ORAL TABLET 325 MG: 325 PILL ORAL at 21:25

## 2023-03-07 ENCOUNTER — APPOINTMENT (OUTPATIENT)
Dept: CT IMAGING | Facility: HOSPITAL | Age: 53
End: 2023-03-07

## 2023-03-07 ENCOUNTER — APPOINTMENT (OUTPATIENT)
Dept: NON INVASIVE DIAGNOSTICS | Facility: HOSPITAL | Age: 53
End: 2023-03-07

## 2023-03-07 DIAGNOSIS — R07.9 CHEST PAIN, UNSPECIFIED TYPE: Primary | ICD-10-CM

## 2023-03-07 LAB
4HR DELTA HS TROPONIN: -17 NG/L
ALBUMIN SERPL BCP-MCNC: 4 G/DL (ref 3.5–5)
ALP SERPL-CCNC: 79 U/L (ref 34–104)
ALT SERPL W P-5'-P-CCNC: 94 U/L (ref 7–52)
ANION GAP SERPL CALCULATED.3IONS-SCNC: 6 MMOL/L (ref 4–13)
AORTIC ROOT: 3 CM
APICAL FOUR CHAMBER EJECTION FRACTION: 58 %
ASCENDING AORTA: 3.6 CM
AST SERPL W P-5'-P-CCNC: 43 U/L (ref 13–39)
ATRIAL RATE: 68 BPM
ATRIAL RATE: 68 BPM
BILIRUB SERPL-MCNC: 0.42 MG/DL (ref 0.2–1)
BUN SERPL-MCNC: 19 MG/DL (ref 5–25)
CALCIUM SERPL-MCNC: 8.6 MG/DL (ref 8.4–10.2)
CARDIAC TROPONIN I PNL SERPL HS: 68 NG/L
CHLORIDE SERPL-SCNC: 105 MMOL/L (ref 96–108)
CHOLEST SERPL-MCNC: 231 MG/DL
CO2 SERPL-SCNC: 27 MMOL/L (ref 21–32)
CREAT SERPL-MCNC: 0.91 MG/DL (ref 0.6–1.3)
E WAVE DECELERATION TIME: 164 MS
ERYTHROCYTE [DISTWIDTH] IN BLOOD BY AUTOMATED COUNT: 12.9 % (ref 11.6–15.1)
FRACTIONAL SHORTENING: 32 (ref 28–44)
GFR SERPL CREATININE-BSD FRML MDRD: 95 ML/MIN/1.73SQ M
GLUCOSE P FAST SERPL-MCNC: 116 MG/DL (ref 65–99)
GLUCOSE SERPL-MCNC: 116 MG/DL (ref 65–140)
HCT VFR BLD AUTO: 42 % (ref 36.5–49.3)
HDLC SERPL-MCNC: 49 MG/DL
HGB BLD-MCNC: 14.6 G/DL (ref 12–17)
INTERVENTRICULAR SEPTUM IN DIASTOLE (PARASTERNAL SHORT AXIS VIEW): 1.5 CM
INTERVENTRICULAR SEPTUM: 1.5 CM (ref 0.6–1.1)
LAAS-AP2: 19.7 CM2
LAAS-AP4: 14.4 CM2
LDLC SERPL CALC-MCNC: 125 MG/DL (ref 0–100)
LEFT ATRIUM AREA SYSTOLE SINGLE PLANE A4C: 14.4 CM2
LEFT ATRIUM SIZE: 3.8 CM
LEFT INTERNAL DIMENSION IN SYSTOLE: 2.8 CM (ref 2.1–4)
LEFT VENTRICULAR INTERNAL DIMENSION IN DIASTOLE: 4.1 CM (ref 3.5–6)
LEFT VENTRICULAR POSTERIOR WALL IN END DIASTOLE: 1 CM
LEFT VENTRICULAR STROKE VOLUME: 47 ML
LVSV (TEICH): 47 ML
MAGNESIUM SERPL-MCNC: 2.2 MG/DL (ref 1.9–2.7)
MCH RBC QN AUTO: 29.4 PG (ref 26.8–34.3)
MCHC RBC AUTO-ENTMCNC: 34.8 G/DL (ref 31.4–37.4)
MCV RBC AUTO: 85 FL (ref 82–98)
MV E'TISSUE VEL-SEP: 7 CM/S
MV PEAK A VEL: 0.69 M/S
MV PEAK E VEL: 67 CM/S
MV STENOSIS PRESSURE HALF TIME: 47 MS
MV VALVE AREA P 1/2 METHOD: 4.68
NONHDLC SERPL-MCNC: 182 MG/DL
P AXIS: 33 DEGREES
P AXIS: 38 DEGREES
PLATELET # BLD AUTO: 195 THOUSANDS/UL (ref 149–390)
PMV BLD AUTO: 10 FL (ref 8.9–12.7)
POTASSIUM SERPL-SCNC: 3.4 MMOL/L (ref 3.5–5.3)
PR INTERVAL: 192 MS
PR INTERVAL: 204 MS
PROT SERPL-MCNC: 6.5 G/DL (ref 6.4–8.4)
QRS AXIS: -31 DEGREES
QRS AXIS: 73 DEGREES
QRSD INTERVAL: 110 MS
QRSD INTERVAL: 118 MS
QT INTERVAL: 404 MS
QT INTERVAL: 428 MS
QTC INTERVAL: 429 MS
QTC INTERVAL: 455 MS
RBC # BLD AUTO: 4.96 MILLION/UL (ref 3.88–5.62)
RIGHT ATRIUM AREA SYSTOLE A4C: 16.7 CM2
RIGHT VENTRICLE ID DIMENSION: 3.8 CM
SL CV LEFT ATRIUM LENGTH A2C: 5.5 CM
SL CV LV EF: 62
SL CV PED ECHO LEFT VENTRICLE DIASTOLIC VOLUME (MOD BIPLANE) 2D: 76 ML
SL CV PED ECHO LEFT VENTRICLE SYSTOLIC VOLUME (MOD BIPLANE) 2D: 29 ML
SODIUM SERPL-SCNC: 138 MMOL/L (ref 135–147)
T WAVE AXIS: 36 DEGREES
T WAVE AXIS: 79 DEGREES
TRICUSPID ANNULAR PLANE SYSTOLIC EXCURSION: 2 CM
TRIGL SERPL-MCNC: 287 MG/DL
VENTRICULAR RATE: 68 BPM
VENTRICULAR RATE: 68 BPM
WBC # BLD AUTO: 8.07 THOUSAND/UL (ref 4.31–10.16)

## 2023-03-07 RX ORDER — POTASSIUM CHLORIDE 20 MEQ/1
40 TABLET, EXTENDED RELEASE ORAL ONCE
Status: COMPLETED | OUTPATIENT
Start: 2023-03-07 | End: 2023-03-07

## 2023-03-07 RX ORDER — ATORVASTATIN CALCIUM 40 MG/1
40 TABLET, FILM COATED ORAL
Status: DISCONTINUED | OUTPATIENT
Start: 2023-03-07 | End: 2023-03-08 | Stop reason: HOSPADM

## 2023-03-07 RX ORDER — CHLORAL HYDRATE 500 MG
1000 CAPSULE ORAL DAILY
Status: DISCONTINUED | OUTPATIENT
Start: 2023-03-07 | End: 2023-03-08 | Stop reason: HOSPADM

## 2023-03-07 RX ORDER — KETOROLAC TROMETHAMINE 30 MG/ML
15 INJECTION, SOLUTION INTRAMUSCULAR; INTRAVENOUS ONCE
Status: COMPLETED | OUTPATIENT
Start: 2023-03-07 | End: 2023-03-07

## 2023-03-07 RX ORDER — ASPIRIN 81 MG/1
81 TABLET ORAL DAILY
Qty: 30 TABLET | Refills: 0 | Status: SHIPPED | OUTPATIENT
Start: 2023-03-08

## 2023-03-07 RX ORDER — CHLORAL HYDRATE 500 MG
1000 CAPSULE ORAL DAILY
Qty: 30 CAPSULE | Refills: 0 | Status: SHIPPED | OUTPATIENT
Start: 2023-03-08

## 2023-03-07 RX ORDER — ATORVASTATIN CALCIUM 40 MG/1
40 TABLET, FILM COATED ORAL
Qty: 30 TABLET | Refills: 0 | Status: SHIPPED | OUTPATIENT
Start: 2023-03-07 | End: 2023-03-10 | Stop reason: SDUPTHER

## 2023-03-07 RX ADMIN — KETOROLAC TROMETHAMINE 15 MG: 30 INJECTION, SOLUTION INTRAMUSCULAR at 10:04

## 2023-03-07 RX ADMIN — ATORVASTATIN CALCIUM 40 MG: 40 TABLET, FILM COATED ORAL at 18:37

## 2023-03-07 RX ADMIN — ACETAMINOPHEN 650 MG: 325 TABLET, FILM COATED ORAL at 05:37

## 2023-03-07 RX ADMIN — OMEGA-3 FATTY ACIDS CAP 1000 MG 1000 MG: 1000 CAP at 12:18

## 2023-03-07 RX ADMIN — AMLODIPINE BESYLATE 5 MG: 5 TABLET ORAL at 09:17

## 2023-03-07 RX ADMIN — POTASSIUM CHLORIDE 40 MEQ: 1500 TABLET, EXTENDED RELEASE ORAL at 10:04

## 2023-03-07 RX ADMIN — METOPROLOL SUCCINATE 25 MG: 25 TABLET, EXTENDED RELEASE ORAL at 09:17

## 2023-03-07 RX ADMIN — LOSARTAN POTASSIUM 100 MG: 50 TABLET, FILM COATED ORAL at 09:17

## 2023-03-07 RX ADMIN — ASPIRIN 81 MG: 81 TABLET, COATED ORAL at 09:17

## 2023-03-07 RX ADMIN — HYDROCHLOROTHIAZIDE 12.5 MG: 12.5 TABLET ORAL at 09:17
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
## 2023-03-07 NOTE — ASSESSMENT & PLAN NOTE
· Elevated BP on arrival, 190/110  · Improved after labetolol in the ED, 125/87  · Continue Toprol-XL, amlodipine, hydrochlorothiazide, valsartan  · Check vitals per routine

## 2023-03-07 NOTE — PLAN OF CARE
Problem: PAIN - ADULT  Goal: Verbalizes/displays adequate comfort level or baseline comfort level  Description: Interventions:  - Encourage patient to monitor pain and request assistance  - Assess pain using appropriate pain scale  - Administer analgesics based on type and severity of pain and evaluate response  - Implement non-pharmacological measures as appropriate and evaluate response  - Consider cultural and social influences on pain and pain management  - Notify physician/advanced practitioner if interventions unsuccessful or patient reports new pain  Outcome: Progressing     Problem: CARDIOVASCULAR - ADULT  Goal: Maintains optimal cardiac output and hemodynamic stability  Description: INTERVENTIONS:  - Monitor I/O, vital signs and rhythm  - Monitor for S/S and trends of decreased cardiac output  - Administer and titrate ordered vasoactive medications to optimize hemodynamic stability  - Assess quality of pulses, skin color and temperature  - Assess for signs of decreased coronary artery perfusion  - Instruct patient to report change in severity of symptoms  Outcome: Progressing  Goal: Absence of cardiac dysrhythmias or at baseline rhythm  Description: INTERVENTIONS:  - Continuous cardiac monitoring, vital signs, obtain 12 lead EKG if ordered  - Administer antiarrhythmic and heart rate control medications as ordered  - Monitor electrolytes and administer replacement therapy as ordered  Outcome: Progressing     Problem: Knowledge Deficit  Goal: Patient/family/caregiver demonstrates understanding of disease process, treatment plan, medications, and discharge instructions  Description: Complete learning assessment and assess knowledge base    Interventions:  - Provide teaching at level of understanding  - Provide teaching via preferred learning methods  Outcome: Progressing

## 2023-03-07 NOTE — PLAN OF CARE
Problem: PAIN - ADULT  Goal: Verbalizes/displays adequate comfort level or baseline comfort level  Description: Interventions:  - Encourage patient to monitor pain and request assistance  - Assess pain using appropriate pain scale  - Administer analgesics based on type and severity of pain and evaluate response  - Implement non-pharmacological measures as appropriate and evaluate response  - Consider cultural and social influences on pain and pain management  - Notify physician/advanced practitioner if interventions unsuccessful or patient reports new pain  Outcome: Progressing     Problem: INFECTION - ADULT  Goal: Absence or prevention of progression during hospitalization  Description: INTERVENTIONS:  - Assess and monitor for signs and symptoms of infection  - Monitor lab/diagnostic results  - Monitor all insertion sites, i e  indwelling lines, tubes, and drains  - Monitor endotracheal if appropriate and nasal secretions for changes in amount and color  - Sassafras appropriate cooling/warming therapies per order  - Administer medications as ordered  - Instruct and encourage patient and family to use good hand hygiene technique  - Identify and instruct in appropriate isolation precautions for identified infection/condition  Outcome: Progressing  Goal: Absence of fever/infection during neutropenic period  Description: INTERVENTIONS:  - Monitor WBC    Outcome: Progressing     Problem: SAFETY ADULT  Goal: Patient will remain free of falls  Description: INTERVENTIONS:  - Educate patient/family on patient safety including physical limitations  - Instruct patient to call for assistance with activity   - Consult OT/PT to assist with strengthening/mobility   - Keep Call bell within reach  - Keep bed low and locked with side rails adjusted as appropriate  - Keep care items and personal belongings within reach  - Initiate and maintain comfort rounds  - Make Fall Risk Sign visible to staff  - Offer Toileting every 2 Hours, in advance of need  - Initiate/Maintain bed alarm  - Obtain necessary fall risk management equipment  - Apply yellow socks and bracelet for high fall risk patients  - Consider moving patient to room near nurses station  Outcome: Progressing  Goal: Maintain or return to baseline ADL function  Description: INTERVENTIONS:  -  Assess patient's ability to carry out ADLs; assess patient's baseline for ADL function and identify physical deficits which impact ability to perform ADLs (bathing, care of mouth/teeth, toileting, grooming, dressing, etc )  - Assess/evaluate cause of self-care deficits   - Assess range of motion  - Assess patient's mobility; develop plan if impaired  - Assess patient's need for assistive devices and provide as appropriate  - Encourage maximum independence but intervene and supervise when necessary  - Involve family in performance of ADLs  - Assess for home care needs following discharge   - Consider OT consult to assist with ADL evaluation and planning for discharge  - Provide patient education as appropriate  Outcome: Progressing  Goal: Maintains/Returns to pre admission functional level  Description: INTERVENTIONS:  - Perform BMAT or MOVE assessment daily    - Set and communicate daily mobility goal to care team and patient/family/caregiver  - Collaborate with rehabilitation services on mobility goals if consulted  - Perform Range of Motion 3 times a day  - Reposition patient every 2 hours    - Dangle patient 3 times a day  - Stand patient 3 times a day  - Ambulate patient 3 times a day  - Out of bed to chair 3 times a day   - Out of bed for meals 3 times a day  - Out of bed for toileting  - Record patient progress and toleration of activity level   Outcome: Progressing     Problem: DISCHARGE PLANNING  Goal: Discharge to home or other facility with appropriate resources  Description: INTERVENTIONS:  - Identify barriers to discharge w/patient and caregiver  - Arrange for needed discharge resources and transportation as appropriate  - Identify discharge learning needs (meds, wound care, etc )  - Arrange for interpretive services to assist at discharge as needed  - Refer to Case Management Department for coordinating discharge planning if the patient needs post-hospital services based on physician/advanced practitioner order or complex needs related to functional status, cognitive ability, or social support system  Outcome: Progressing     Problem: Knowledge Deficit  Goal: Patient/family/caregiver demonstrates understanding of disease process, treatment plan, medications, and discharge instructions  Description: Complete learning assessment and assess knowledge base    Interventions:  - Provide teaching at level of understanding  - Provide teaching via preferred learning methods  Outcome: Progressing     Problem: CARDIOVASCULAR - ADULT  Goal: Maintains optimal cardiac output and hemodynamic stability  Description: INTERVENTIONS:  - Monitor I/O, vital signs and rhythm  - Monitor for S/S and trends of decreased cardiac output  - Administer and titrate ordered vasoactive medications to optimize hemodynamic stability  - Assess quality of pulses, skin color and temperature  - Assess for signs of decreased coronary artery perfusion  - Instruct patient to report change in severity of symptoms  Outcome: Progressing  Goal: Absence of cardiac dysrhythmias or at baseline rhythm  Description: INTERVENTIONS:  - Continuous cardiac monitoring, vital signs, obtain 12 lead EKG if ordered  - Administer antiarrhythmic and heart rate control medications as ordered  - Monitor electrolytes and administer replacement therapy as ordered  Outcome: Progressing
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
## 2023-03-07 NOTE — ASSESSMENT & PLAN NOTE
Patient presenting to the ED for palpitations, high blood pressure, chest pain that started earlier this morning  Reports compliance with his home medications  Describes the chest pain as midsternal and radiates to bilateral arms  Denies any associated dizziness, nausea, shortness of breath  Per patient and his wife he has been working nonstop over the last 5 days  He has also been working night shifts and not sleeping  Also reports increased stress  · LLUVIA score 2  · ECG: NSR with IRBBB  No acute ischemic changes  · Troponin level: 85 --> 65  · Slight elevation in troponin likely secondary to elevated BP over the last few days  · Chest x-ray report noted with left-sided effusion versus consolidation  · 2D echo report noted with normal EF with grade 1 diastolic function  Currently patient asymptomatic  Patient had reported of having headache this morning which is better with Tylenol and Toradol  Currently denies chest pain, dyspnea, nausea, diaphoresis, any other new comments  Had normal nuclear stress test 2 years ago    10-year risk of 7%  Continue aspirin 81 mg daily, Lipitor 40 mg nightly  Follow-up with inpatient CT chest  Cardiology recommended outpatient stress test

## 2023-03-07 NOTE — DISCHARGE INSTR - AVS FIRST PAGE
Recommend close follow-up with primary care provider within 1 to 2 weeks of discharge  Recommend to repeat lipid profile in 6 to 8 weeks with primary care provider    Outpatient follow-up with cardiology for outpatient stress test

## 2023-03-07 NOTE — CASE MANAGEMENT
Case Management Assessment & Discharge Planning Note    Patient name Carly Moy  Location /-86 MRN [id number]  : 1970 Date 3/7/2023       Current Admission Date: 3/6/2023  Current Admission Diagnosis:Chest pain   Patient Active Problem List    Diagnosis Date Noted   • Chest pain 2023   • Chronic bladder pain 10/07/2022   • Postprocedural stricture of anterior urethra 08/15/2022   • Obesity 2022   • Complex tear of medial meniscus of left knee 2022   • Osteoarthritis of left knee 2022   • Prostatic disorder 10/23/2020   • HTN (hypertension) 10/23/2020   • BPH (benign prostatic hyperplasia) 10/23/2020   • Hyperlipidemia 10/23/2020      LOS (days): 0  Geometric Mean LOS (GMLOS) (days):   Days to GMLOS:     OBJECTIVE:              Current admission status: Observation       Preferred Pharmacy:   [address] [place] [address], [address] [place] [address] [place] [address]  Phone: [phone] Fax: [phone]    Primary Care Provider: Deepthi Cesar MD    Primary Insurance: [address] [place] [address]  Secondary Insurance:     ASSESSMENT:  Nilay Silva Proxies    There are no active Health Care Proxies on file  Obs Notice Signed: 23    Readmission Root Cause  30 Day Readmission: No    Patient Information  Admitted from[de-identified] Home  Mental Status: Alert  During Assessment patient was accompanied by: Spouse  Assessment information provided by[de-identified] Spouse  Primary Caregiver: Self  Support Systems: Spouse/significant other  [place] of Residence: [place] [address] do you live in?: [address] [facility] entry access options   Select all that apply : Stairs  Number of steps to enter home : 4  Do the steps have railings?: Yes  Type of Current Residence: [facility]  In the last 12 months, was there a time when you were not able to pay the mortgage or rent on time?: No  In the last 12 months, how many places have you lived?: 1  In the last 12 months, was there a time when you did not have a steady place to sleep or slept in a shelter (including now)?: No  Homeless/housing insecurity resource given?: N/A  Living Arrangements: Lives w/ Spouse/significant other  Is patient a ?: No    Activities of Daily Living Prior to Admission  Functional Status: Independent  Completes ADLs independently?: Yes  Ambulates independently?: Yes  Does patient use assisted devices?: No  Does patient currently own DME?: No  Does patient have a history of Outpatient Therapy (PT/OT)?: No  Does the patient have a history of Short-Term Rehab?: No  Does patient have a history of HHC?: No  Does patient currently have Sweeten?: No         Patient Information Continued  Income Source: Employed  Does patient have prescription coverage?: Yes  Within the past 12 months, you worried that your food would run out before you got the money to buy more : Never true  Within the past 12 months, the food you bought just didn't last and you didn't have money to get more : Never true  Food insecurity resource given?: N/A  Does patient receive dialysis treatments?: No  Does patient have a history of substance abuse?: No  Does patient have a history of Mental Health Diagnosis?: No         Means of Transportation  Means of Transport to Appts[de-identified] Drives Self  In the past 12 months, has lack of transportation kept you from medical appointments or from getting medications?: No  In the past 12 months, has lack of transportation kept you from meetings, work, or from getting things needed for daily living?: No  Was application for public transport provided?: N/A        DISCHARGE DETAILS:    Discharge planning discussed with[de-identified] patient and his wife  Freedom of Choice: Yes  Comments - Freedom of Choice: No DC needs anticipated  CM contacted family/caregiver?: Yes  Were Treatment Team discharge recommendations reviewed with patient/caregiver?: Yes  Did patient/caregiver verbalize understanding of patient care needs?: Yes  Were patient/caregiver advised of the risks associated with not following Treatment Team discharge recommendations?: Yes    Contacts  Patient Contacts: wife  Relationship to Patient[de-identified] Family  Contact Method:  In Person  Reason/Outcome: Continuity of [facility]         Is the patient interested in [facility] at discharge?: No    DME Referral Provided  Referral made for DME?: No    Other Referral/Resources/Interventions Provided:  Referral Comments: No DC needs anticipated         Treatment Team Recommendation: Home  Discharge Destination Plan[de-identified] Home  Transport at Discharge : Automobile, Self

## 2023-03-07 NOTE — ASSESSMENT & PLAN NOTE
Profile noted with elevated cholesterol, elevated triglycerides  Patient is not on statin  Also counseled on lifestyle modification  Started on Lipitor 40 mg nightly, fish oil  Recommended outpatient follow-up with primary care provider to repeat lipid profile in 6 to 8 weeks

## 2023-03-07 NOTE — ASSESSMENT & PLAN NOTE
· Elevated BP on arrival, 190/110  · Improved after labetolol in the ED, 125/87  · Continue pre-hospital metoprolol, amlodipine, hydrochlorothiazide, valsartan  · Check vitals per routine

## 2023-03-07 NOTE — ASSESSMENT & PLAN NOTE
Patient presenting to the ED for palpitations, high blood pressure, chest pain that started earlier this morning  Reports compliance with his home medications  Describes the chest pain as midsternal and radiates to bilateral arms  Denies any associated dizziness, nausea, shortness of breath  Per patient and his wife he has been working nonstop over the last 5 days  He has also been working night shifts and not sleeping  Also reports increased stress  · LLUVIA score 2  · ECG: NSR with IRBBB  No acute ischemic changes  · Troponin level: 0hr 85  · Slight elevation in troponin likely secondary to elevated BP over the last few days  Will continue to trend Q2H with ECG   If troponin trends upwards will initiate on heparin infusion  · Obtain Echo  · Monitor on telemetry  · Consult to Cardiology, recommendations are appreciated

## 2023-03-07 NOTE — ED PROVIDER NOTES
History  Chief Complaint   Patient presents with   • Palpitations     Patient reports episodes of palpitations, HTN, headache, dizziness since this morning     This is a  male of 48years of age who presents emergency department with palpitations and intermittent chest pressure that started today  He felt his heart was going fast and thumping hard felt dizzy and weak and had associated chest pressure  Some shortness of breath associated with the symptoms  Patient does not smoke does not drink  Surgical history of knee surgery in the past       History provided by:  Patient and spouse   used: No    Palpitations  Palpitations quality:  Fast  Associated symptoms: chest pain    Associated symptoms: no back pain, no cough, no shortness of breath and no vomiting        Prior to Admission Medications   Prescriptions Last Dose Informant Patient Reported? Taking?   acetaminophen (TYLENOL) 650 mg CR tablet   No No   Sig: Take 1 tablet (650 mg total) by mouth every 8 (eight) hours as needed for mild pain   amLODIPine (NORVASC) 5 mg tablet   No No   Sig: Take 1 tablet (5 mg total) by mouth daily   celecoxib (CeleBREX) 100 mg capsule   No No   Sig: Take 1 capsule (100 mg total) by mouth 2 (two) times a day   hydrochlorothiazide (MICROZIDE) 12 5 mg capsule   Yes No   Sig: Take 12 5 mg by mouth daily   metoprolol succinate (Toprol XL) 25 mg 24 hr tablet   No No   Sig: Take 1 tablet (25 mg total) by mouth daily   phenazopyridine (PYRIDIUM) 100 mg tablet   No No   Sig: Take 1 tablet (100 mg total) by mouth 3 (three) times a day as needed for bladder spasms   phenazopyridine (PYRIDIUM) 200 mg tablet   No No   Sig: Take 1 tablet (200 mg total) by mouth 3 (three) times a day with meals Do not use for more than 4 days in a row   Should be at least 2 weeks before using again   valsartan (DIOVAN) 320 MG tablet   No No   Sig: Take 1 tablet (320 mg total) by mouth daily      Facility-Administered Medications: None       Past Medical History:   Diagnosis Date   • Enlarged prostate    • Hyperlipidemia    • Hypertension    • Neuropathy        Past Surgical History:   Procedure Laterality Date   • KNEE ARTHROSCOPY Right    • SD CYSTOSCOPY,DIR VIS INT URETHROTOMY N/A 10/23/2020    Procedure: DIRECT VISUAL INTERAL URETHROTOMY (DVIU); Surgeon: Christine Gleason MD;  Location: [facility];  Service: Urology   • SD CYSTOURETHROSCOPY N/A 10/23/2020    Procedure: Venida Black;  Surgeon: Christine Gleason MD;  [facility];  Service: Urology   • SD KNEE [address] Left 6/2/2022    Procedure: Left knee arthroscopy  partial Medial menisectomy  Condroplasty ;  Surgeon: Nuris Lantigua MD;  Location: [facility];  Service: Orthopedics   • TRANSURETHRAL RESECTION OF PROSTATE         Family History   Problem Relation Age of Onset   • No Known Problems Mother    • No Known Problems Father      I have reviewed and agree with the history as documented  E-Cigarette/Vaping   • E-Cigarette Use Never User      E-Cigarette/Vaping Substances     Social History     Tobacco Use   • Smoking status: Never   • Smokeless tobacco: Never   Vaping Use   • Vaping Use: Never used   Substance Use Topics   • Alcohol use: Never   • Drug use: No       Review of Systems   Constitutional: Negative for chills and fever  HENT: Negative for ear pain and sore throat  Eyes: Negative for pain and visual disturbance  Respiratory: Positive for chest tightness  Negative for cough and shortness of breath  Cardiovascular: Positive for chest pain and palpitations  Negative for leg swelling  Gastrointestinal: Negative for abdominal pain and vomiting  Genitourinary: Negative for dysuria and hematuria  Musculoskeletal: Negative for arthralgias and back pain  Skin: Negative for color change and rash  Neurological: Negative for seizures and syncope  All other systems reviewed and are negative        Physical Exam  Physical Exam  Vitals and nursing note reviewed  Constitutional:       General: He is not in acute distress  Appearance: Normal appearance  He is well-developed and normal weight  HENT:      Head: Normocephalic and atraumatic  Right Ear: External ear normal       Left Ear: External ear normal       Mouth/Throat:      Mouth: Mucous membranes are moist    Eyes:      Conjunctiva/sclera: Conjunctivae normal       Pupils: Pupils are equal, round, and reactive to light  Cardiovascular:      Rate and Rhythm: Normal rate and regular rhythm  Pulses: Normal pulses  Heart sounds: Normal heart sounds  No murmur heard  Pulmonary:      Effort: Pulmonary effort is normal  No respiratory distress  Breath sounds: Normal breath sounds  Abdominal:      Palpations: Abdomen is soft  Tenderness: There is no abdominal tenderness  Musculoskeletal:         General: No swelling  Cervical back: Neck supple  Skin:     General: Skin is warm and dry  Capillary Refill: Capillary refill takes less than 2 seconds  Neurological:      General: No focal deficit present  Mental Status: He is alert and oriented to person, place, and time  Psychiatric:         Mood and Affect: Mood normal          Thought Content:  Thought content normal          Judgment: Judgment normal          Vital Signs  ED Triage Vitals   Temperature Pulse Respirations Blood Pressure SpO2   03/06/23 1946 03/06/23 1946 03/06/23 1946 03/06/23 1946 03/06/23 1946   98 7 °F (37 1 °C) 73 18 (!) 190/110 97 %      Temp Source Heart Rate Source Patient Position - Orthostatic VS BP Location FiO2 (%)   03/06/23 1946 03/06/23 1946 03/06/23 2000 03/06/23 2009 --   Oral Monitor Sitting Right arm       Pain Score       --                  Vitals:    03/06/23 1946 03/06/23 2000 03/06/23 2009 03/06/23 2100   BP: (!) 190/110 162/96 162/96 125/87   Pulse: 73 68 74 67   Patient Position - Orthostatic VS:  Sitting Lying Lying         Visual Acuity      ED Medications  Medications   labetalol (NORMODYNE) injection 10 mg (10 mg Intravenous Given 3/6/23 2007)   aspirin tablet 325 mg (325 mg Oral Given 3/6/23 2125)   nitroglycerin (NITRO-BID) 2 % TD ointment 1 inch (1 inch Topical Given 3/6/23 2125)       Diagnostic Studies  Results Reviewed     Procedure Component Value Units Date/Time    HS Troponin I 2hr [[id number]]     Lab Status: No result Specimen: Blood     HS Troponin I 4hr [[id number]]     Lab Status: No result Specimen: Blood     HS Troponin 0hr (reflex protocol) [[id number]]  (Abnormal) Collected: 03/06/23 2002    Lab Status: Final result Specimen: Blood from Arm, Right Updated: 03/06/23 2039     hs TnI 0hr 85 ng/L     Basic metabolic panel [[id number]] Collected: 03/06/23 2002    Lab Status: Final result Specimen: Blood from Arm, Right Updated: 03/06/23 2028     Sodium 139 mmol/L      Potassium 4 1 mmol/L      Chloride 104 mmol/L      CO2 30 mmol/L      ANION GAP 5 mmol/L      BUN 13 mg/dL      Creatinine 0 82 mg/dL      Glucose 95 mg/dL      Calcium 9 1 mg/dL      eGFR 100 ml/min/1 73sq m     Narrative:      [facility] guidelines for Chronic Kidney Disease (CKD):   •  Stage 1 with normal or high GFR (GFR > 90 mL/min/1 73 square meters)  •  Stage 2 Mild CKD (GFR = 60-89 mL/min/1 73 square meters)  •  Stage 3A Moderate CKD (GFR = 45-59 mL/min/1 73 square meters)  •  Stage 3B Moderate CKD (GFR = 30-44 mL/min/1 73 square meters)  •  Stage 4 Severe CKD (GFR = 15-29 mL/min/1 73 square meters)  •  Stage 5 End Stage CKD (GFR <15 mL/min/1 73 square meters)  Note: GFR calculation is accurate only with a steady state creatinine    CBC and differential [[id number]] Collected: 03/06/23 2002    Lab Status: Final result Specimen: Blood from Arm, Right Updated: 03/06/23 2009     WBC 9 15 Thousand/uL      RBC 5 21 Million/uL      Hemoglobin 15 4 g/dL      Hematocrit 44 6 %      MCV 86 fL      MCH 29 6 pg      MCHC 34 5 g/dL      RDW 12 7 % MPV 10 5 fL      Platelets 453 Thousands/uL      nRBC 0 /100 WBCs      Neutrophils Relative 53 %      Immat GRANS % 0 %      Lymphocytes Relative 33 %      Monocytes Relative 8 %      Eosinophils Relative 5 %      Basophils Relative 1 %      Neutrophils Absolute 4 75 Thousands/µL      Immature Grans Absolute 0 04 Thousand/uL      Lymphocytes Absolute 3 05 Thousands/µL      Monocytes Absolute 0 75 Thousand/µL      Eosinophils Absolute 0 48 Thousand/µL      Basophils Absolute 0 08 Thousands/µL                  XR chest 1 view portable    (Results Pending)              Procedures  Procedures         ED Course  ED Course as of 03/06/23 2140   Mon Mar 06, 2023   2117 hs TnI 0hr(!): 80                               SBIRT 22yo+    Ethel Wiseman Most Recent Value   SBIRT (23 yo +)    In order to provide better care to our patients, we are screening all of our patients for alcohol and drug use  Would it be okay to ask you these screening questions? Yes Filed at: 03/06/2023 2003   Initial Alcohol Screen: US AUDIT-C     1  How often do you have a drink containing alcohol? 0 Filed at: 03/06/2023 2003   2  How many drinks containing alcohol do you have on a typical day you are drinking? 0 Filed at: 03/06/2023 2003   3a  Male UNDER 65: How often do you have five or more drinks on one occasion? 0 Filed at: 03/06/2023 2003   3b  FEMALE Any Age, or MALE 65+: How often do you have 4 or more drinks on one occassion? 0 Filed at: 03/06/2023 2003   Audit-C Score 0 Filed at: 03/06/2023 2003   THERESA: How many times in the past year have you    Used an illegal drug or used a prescription medication for non-medical reasons?  Never Filed at: 03/06/2023 2003                    Medical Decision Making  Differential diagnose includes but not limited to, ACS, acute MI, SVT, arrhythmia, pulmonary embolism, pneumothorax, CHF    Patient has presented to the Emergency Department with exacerbation of chronic condition / pt with new illness-injury that poses a threat to life or body function  At least 3 different tests have been ordered on this patient and reviewed the results  Elevated trop  Normal cbc, bmp    EKG was normal sinus rhythm at 68 bpm incomplete right bundle elba block no ST elevations or depressions    Old laboratory data was reviewed from the medical records and compared to today's results  Discussion with patient and or family members of results (normal and abnormal) and the implications for immediate and long term treatment/management  Hospitalization was considered in this patient    Chest pain, unspecified type: acute illness or injury  High blood pressure: chronic illness or injury with exacerbation, progression, or side effects of treatment  Amount and/or Complexity of Data Reviewed  Labs: ordered  Decision-making details documented in ED Course  Details: + trop   Radiology: ordered  Details: NAD  Discussion of management or test interpretation with external provider(s): Blood pressure much improved with medication  However patient still experiencing mild chest pain  Risk  OTC drugs  Prescription drug management  Disposition  Final diagnoses:   High blood pressure   Chest pain, unspecified type     Time reflects when diagnosis was documented in both MDM as applicable and the Disposition within this note     Time User Action Codes Description Comment    3/6/2023  9:24 PM Dean Echols Add [I10] High blood pressure     3/6/2023  9:24 PM Dean Echols [R07 9] Chest pain, unspecified type       ED Disposition     ED Disposition   Admit    Condition   Stable    Date/Time   Mon Mar 6, 2023  9:24 PM    Comment   Case was discussed with Hospitalist and the patient's admission status was agreed to be Admission Status: observation status to the service of Dr Oren Garza              Follow-up Information    None         Patient's Medications   Discharge Prescriptions    No medications on file No discharge procedures on file      PDMP Review     None          ED Provider  Electronically Signed by           Kevin Giles MD  03/06/23 8456

## 2023-03-07 NOTE — H&P
[address] [facility] 1970, 48 y o  male MRN: [id number]  Unit/Bed#: -01 Encounter: [id number]  Primary Care Provider: Norma Davila MD   Date and time admitted to hospital: 3/6/2023  7:53 PM    * Chest pain  Assessment & Plan  Patient presenting to the ED for palpitations, high blood pressure, chest pain that started earlier this morning  Reports compliance with his home medications  Describes the chest pain as midsternal and radiates to bilateral arms  Denies any associated dizziness, nausea, shortness of breath  Per patient and his wife he has been working nonstop over the last 5 days  He has also been working night shifts and not sleeping  Also reports increased stress  · LLUVIA score 2  · ECG: NSR with IRBBB  No acute ischemic changes  · Troponin level: 0hr 85  · Slight elevation in troponin likely secondary to elevated BP over the last few days  Will continue to trend Q2H with ECG  If troponin trends upwards will initiate on heparin infusion  · Obtain Echo  · Monitor on telemetry  · Consult to Cardiology, recommendations are appreciated    HTN (hypertension)  Assessment & Plan  · Elevated BP on arrival, 190/110  · Improved after labetolol in the ED, 125/87  · Continue pre-hospital metoprolol, amlodipine, hydrochlorothiazide, valsartan  · Check vitals per routine      VTE Prophylaxis: Pharmacologic VTE Prophylaxis contraindicated due to low risk  / sequential compression device   Code Status: Level 1 code  Discussion with family: Spouse at bedside    Anticipated Length of Stay:  Patient will be admitted on an Observation basis with an anticipated length of stay of  Less than 2 midnights  Justification for Hospital Stay: Chest pain    Total Time for Visit, including Counseling / Coordination of Care: 90 minutes  Greater than 50% of this total time spent on direct patient counseling and coordination of care      Chief Complaint:   Chest pain    History of Present Illness:    Erin Crabtree is a 48 y o  male who a past medical history significant for hypertension  Patient presenting to the ED for palpitations, high blood pressure, chest pain that started earlier this morning  Reports compliance with his home medications  Describes the chest pain as midsternal and radiates to bilateral arms  Denies any associated dizziness, nausea, shortness of breath  Per patient and his wife he has been working nonstop over the last 5 days  He has also been working night shifts and not sleeping  Also reports increased stress  Patient require medical admission for further treatment and evaluation of chest pain with cardiology consultation  All patient questions answered to the best of my ability  Review of Systems:    Review of Systems   Constitutional: Negative for chills and fever  HENT: Negative for ear pain and sore throat  Eyes: Negative for pain and visual disturbance  Respiratory: Negative for cough and shortness of breath  Cardiovascular: Positive for chest pain and palpitations  Gastrointestinal: Negative for abdominal pain and vomiting  Genitourinary: Negative for dysuria and hematuria  Musculoskeletal: Negative for arthralgias and back pain  Skin: Negative for color change and rash  Neurological: Negative for seizures and syncope  All other systems reviewed and are negative  Past Medical and Surgical History:     Past Medical History:   Diagnosis Date   • Enlarged prostate    • Hyperlipidemia    • Hypertension    • Neuropathy        Past Surgical History:   Procedure Laterality Date   • KNEE ARTHROSCOPY Right    • AR CYSTOSCOPY,DIR VIS INT URETHROTOMY N/A 10/23/2020    Procedure: DIRECT VISUAL INTERAL URETHROTOMY (DVIU);   Surgeon: Catia Kraus MD;  Location: [facility] MAIN OR;  Service: Urology   • AR CYSTOURETHROSCOPY N/A 10/23/2020    Procedure: CYSTOSCOPY;  Surgeon: Catia Kraus MD;  [facility];  Service: Urology   • WA KNEE [address] Left 6/2/2022    Procedure: Left knee arthroscopy  partial Medial menisectomy  Condroplasty ;  Surgeon: Autumn Bonilla MD;  Location: [facility];  Service: Orthopedics   • TRANSURETHRAL RESECTION OF PROSTATE         Meds/Allergies:    Prior to Admission medications    Medication Sig Start Date End Date Taking? Authorizing Provider   metoprolol succinate (Toprol XL) 25 mg 24 hr tablet Take 1 tablet (25 mg total) by mouth daily 2/15/23   Yesica Anderson MD   amLODIPine (NORVASC) 5 mg tablet Take 1 tablet (5 mg total) by mouth daily 4/19/22   Yesica Anderson MD   celecoxib (CeleBREX) 100 mg capsule Take 1 capsule (100 mg total) by mouth 2 (two) times a day 6/2/22   Autumn Bonilla MD   hydrochlorothiazide (MICROZIDE) 12 5 mg capsule Take 12 5 mg by mouth daily 10/25/21   Historical Provider, MD   phenazopyridine (PYRIDIUM) 200 mg tablet Take 1 tablet (200 mg total) by mouth 3 (three) times a day with meals Do not use for more than 4 days in a row  Should be at least 2 weeks before using again 10/7/22   Dang Landa MD   valsartan (DIOVAN) 320 MG tablet Take 1 tablet (320 mg total) by mouth daily 4/19/22   Yesica Anderson MD   acetaminophen (TYLENOL) 650 mg CR tablet Take 1 tablet (650 mg total) by mouth every 8 (eight) hours as needed for mild pain 6/2/22 3/6/23  Autumn Bonilla MD   phenazopyridine (PYRIDIUM) 100 mg tablet Take 1 tablet (100 mg total) by mouth 3 (three) times a day as needed for bladder spasms 7/11/22 3/6/23  MICHELLE Vera     I have reviewed home medications using allscripts      Allergies: No Known Allergies    Social History:     Marital Status: /Civil Union   Occupation: NA  Patient Pre-hospital Living Situation: Home  Patient Pre-hospital Level of Mobility: Walks  Patient Pre-hospital Diet Restrictions: None  Substance Use History:   Social History     Substance and Sexual Activity   Alcohol Use Never     Social History     Tobacco Use   Smoking Status Never   Smokeless Tobacco Never     Social History     Substance and Sexual Activity   Drug Use No       Family History:    Family History   Problem Relation Age of Onset   • No Known Problems Mother    • No Known Problems Father        Physical Exam:     Vitals:   Blood Pressure: 125/87 (03/06/23 2100)  Pulse: 67 (03/06/23 2100)  Temperature: 98 7 °F (37 1 °C) (03/06/23 1946)  Temp Source: Oral (03/06/23 1946)  Respirations: 22 (03/06/23 2100)  SpO2: 97 % (03/06/23 2100)    Physical Exam  Vitals and nursing note reviewed  Constitutional:       General: He is not in acute distress  Appearance: He is well-developed  HENT:      Head: Normocephalic and atraumatic  Mouth/Throat:      Pharynx: Oropharynx is clear  Eyes:      Conjunctiva/sclera: Conjunctivae normal    Cardiovascular:      Rate and Rhythm: Normal rate and regular rhythm  Heart sounds: No murmur heard  Pulmonary:      Effort: Pulmonary effort is normal  No respiratory distress  Breath sounds: Normal breath sounds  Abdominal:      Palpations: Abdomen is soft  Tenderness: There is no abdominal tenderness  Musculoskeletal:         General: No swelling  Cervical back: Neck supple  Skin:     General: Skin is warm and dry  Capillary Refill: Capillary refill takes less than 2 seconds  Neurological:      Mental Status: He is alert and oriented to person, place, and time  Psychiatric:         Mood and Affect: Mood normal          Additional Data:     Lab Results: I have personally reviewed pertinent reports        Results from last 7 days   Lab Units 03/06/23 2002   WBC Thousand/uL 9 15   HEMOGLOBIN g/dL 15 4   HEMATOCRIT % 44 6   PLATELETS Thousands/uL 206   NEUTROS PCT % 53   LYMPHS PCT % 33   MONOS PCT % 8   EOS PCT % 5     Results from last 7 days   Lab Units 03/06/23 2002   SODIUM mmol/L 139   POTASSIUM mmol/L 4 1   CHLORIDE mmol/L 104   CO2 mmol/L 30   BUN mg/dL 13 CREATININE mg/dL 0 82   ANION GAP mmol/L 5   CALCIUM mg/dL 9 1   GLUCOSE RANDOM mg/dL 95                       Imaging: I have personally reviewed pertinent reports  XR chest 1 view portable    (Results Pending)       EKG, Pathology, and Other Studies Reviewed on Admission:   · EKG: Reviewed    Allscripts / Epic Records Reviewed: Yes     ** Please Note: This note has been constructed using a voice recognition system   **

## 2023-03-07 NOTE — PROGRESS NOTES
[facility]  Progress Note - [address] 1970, 48 y o  male MRN: [id number]  Unit/Bed#: -01 Encounter: [id number]  Primary Care Provider: Santa Degroot MD   Date and time admitted to hospital: 3/6/2023  7:53 PM    * Chest pain  Assessment & Plan  Patient presenting to the ED for palpitations, high blood pressure, chest pain that started earlier this morning  Reports compliance with his home medications  Describes the chest pain as midsternal and radiates to bilateral arms  Denies any associated dizziness, nausea, shortness of breath  Per patient and his wife he has been working nonstop over the last 5 days  He has also been working night shifts and not sleeping  Also reports increased stress  · LLUVIA score 2  · ECG: NSR with IRBBB  No acute ischemic changes  · Troponin level: 85 --> 65  · Slight elevation in troponin likely secondary to elevated BP over the last few days  · Chest x-ray report noted with left-sided effusion versus consolidation  · 2D echo report noted with normal EF with grade 1 diastolic function  Currently patient asymptomatic  Patient had reported of having headache this morning which is better with Tylenol and Toradol  Currently denies chest pain, dyspnea, nausea, diaphoresis, any other new comments  Had normal nuclear stress test 2 years ago  10-year risk of 7%  Continue aspirin 81 mg daily, Lipitor 40 mg nightly  Follow-up with inpatient CT chest  Cardiology recommended outpatient stress test    Obesity  Assessment & Plan  Counseled on weight loss, lifestyle modification  HLD (hyperlipidemia)  Assessment & Plan  Profile noted with elevated cholesterol, elevated triglycerides  Patient is not on statin  Also counseled on lifestyle modification  Started on Lipitor 40 mg nightly, fish oil  Recommended outpatient follow-up with primary care provider to repeat lipid profile in 6 to 8 weeks        HTN (hypertension)  Assessment & Plan  · Elevated BP on arrival, 190/110  · Improved after labetolol in the ED, 125/87  · Continue Toprol-XL, amlodipine, hydrochlorothiazide, valsartan  · Check vitals per routine      VTE Pharmacologic Prophylaxis: VTE Score: 2 Low Risk (Score 0-2) - Encourage Ambulation  Patient Centered Rounds: I performed bedside rounds with nursing staff today  Discussions with Specialists or Other Care Team Provider: Cardiology    Education and Discussions with Family / Patient: Updated  (wife) at bedside  Current Length of Stay: 0 day(s)  Current Patient Status: Observation   Certification Statement: The patient will continue to require additional inpatient hospital stay due to Awaiting CT chest report  Discharge Plan: Anticipate discharge tomorrow to home  Code Status: Level 1 - Full Code    Subjective:   Patient had complained of having headache this morning however reports improvement after Tylenol and Toradol  Denies chest pain at this time  Report of having wheezing at times associated with feeling fatigued  Denies nausea, vomiting, fever, chills, cough, abdominal pain, dysuria, diarrhea, any other new comments  Objective:     Vitals:   Temp (24hrs), Av 2 °F (36 8 °C), Min:97 8 °F (36 6 °C), Max:98 7 °F (37 1 °C)    Temp:  [97 8 °F (36 6 °C)-98 7 °F (37 1 °C)] 98 °F (36 7 °C)  HR:  [67-74] 68  Resp:  [16-22] 16  BP: (125-190)/() 132/85  SpO2:  [94 %-99 %] 97 %  Body mass index is 37 76 kg/m²  Input and Output Summary (last 24 hours): Intake/Output Summary (Last 24 hours) at 3/7/2023 1541  Last data filed at 3/7/2023 0555  Gross per 24 hour   Intake 120 ml   Output 450 ml   Net -330 ml       Physical Exam:   Physical Exam  Constitutional:       General: He is not in acute distress  Appearance: Normal appearance  He is obese  He is not ill-appearing  Comments: Obese male patient, acutely nontoxic-appearing  HENT:      Head: Normocephalic and atraumatic     Eyes: Pupils: Pupils are equal, round, and reactive to light  Cardiovascular:      Rate and Rhythm: Normal rate  Pulses: Normal pulses  Pulmonary:      Effort: Pulmonary effort is normal  No respiratory distress  Breath sounds: Wheezing (Left lower lobe) present  Comments: Not in acute respite distress  O2 saturation 97% on room air  Abdominal:      General: Bowel sounds are normal  There is no distension  Palpations: Abdomen is soft  Tenderness: There is no abdominal tenderness  Musculoskeletal:      Cervical back: No rigidity  Right lower leg: No edema  Left lower leg: No edema  Neurological:      Mental Status: He is alert and oriented to person, place, and time     Psychiatric:         Mood and Affect: Mood normal          Behavior: Behavior normal           Additional Data:     Labs:  Results from last 7 days   Lab Units 03/07/23  0532 03/06/23 2002   WBC Thousand/uL 8 07 9 15   HEMOGLOBIN g/dL 14 6 15 4   HEMATOCRIT % 42 0 44 6   PLATELETS Thousands/uL 195 206   NEUTROS PCT %  --  53   LYMPHS PCT %  --  33   MONOS PCT %  --  8   EOS PCT %  --  5     Results from last 7 days   Lab Units 03/07/23  0532   SODIUM mmol/L 138   POTASSIUM mmol/L 3 4*   CHLORIDE mmol/L 105   CO2 mmol/L 27   BUN mg/dL 19   CREATININE mg/dL 0 91   ANION GAP mmol/L 6   CALCIUM mg/dL 8 6   ALBUMIN g/dL 4 0   TOTAL BILIRUBIN mg/dL 0 42   ALK PHOS U/L 79   ALT U/L 94*   AST U/L 43*   GLUCOSE RANDOM mg/dL 116                       Lines/Drains:  Invasive Devices     Peripheral Intravenous Line  Duration           Peripheral IV 03/06/23 Distal;Right;Upper;Ventral (anterior) Arm <1 day                      Imaging: Reviewed radiology reports from this admission including: chest xray    Recent Cultures (last 7 days):         Last 24 Hours Medication List:   Current Facility-Administered Medications   Medication Dose Route Frequency Provider Last Rate   • acetaminophen  650 mg Oral Q6H PRN Dayday Chowdhury VELIA     • amLODIPine  5 mg Oral Daily Imani Otto PA-C     • aspirin  81 mg Oral Daily Imani Otto PA-C     • atorvastatin  40 mg Oral Daily With Guardian Kaye LEE MD     • fish oil  1,000 mg Oral Daily Manuel LEE MD     • hydrochlorothiazide  12 5 mg Oral Daily Imani Otto PA-C     • losartan  100 mg Oral Daily Imani Otto PA-C     • metoprolol succinate  25 mg Oral Daily Isidra Llamas PA-C          Today, Patient Was Seen By: Christene Cooks, MD    **Please Note: This note may have been constructed using a voice recognition system  **

## 2023-03-07 NOTE — CONSULTS
Consultation - Cardiology   [place] Rob Alba 48 y o  male MRN: [id number]  Unit/Bed#: -01 Encounter: [id number]  03/07/23  10:24 AM    Assessment/ Plan:  1  Chest pain  • EKG reveals normal sinus rhythm without acute ischemic changes  • Peak troponin of 85, suspected nonischemic myocardial injury  • Previous pharmacological stress test without ischemic findings (5/3/2022)  • Echo reveals EF 62% with moderate asymmetric hypertrophy  • Continue aspirin and Toprol-XL  • Plan for outpatient stress echo test    2  Hypertensive urgency  • Currently well controlled, previously as high as 190/110 upon admission  • Continue amlodipine, hydrochlorothiazide, losartan, and Toprol-XL    3  Elevated troponins  • Suspected nonischemic myocardial injury secondary to hypertensive urgency  • Peak troponin of 85  • See plan above    4  Hyperlipidemia  • Continue dietary control      Patient is cleared for discharge from cardiology standpoint on current medications  Plan for outpatient stress echo test and follow-up  Thank you for this consultation  History of Present Illness   Physician Requesting Consult: Arden Salamanca MD    Reason for Consult / Principal Problem: Chest pain    HPI: Cherelle Sunshine is a 48y o  year old male with history of hypertension and hyperlipidemia who presents with chest pain and palpitations  Chest pain began yesterday morning  Described as midsternal pressure with radiation to both arms  Denies associated shortness of breath, nausea, or dizziness  Endorses strict compliance to all medications  Of note patient has been under more stress lately and overworked at job  Per wife patient has been working nonstop over the past 5 days including night shifts, so he has not been sleeping  Blood pressure found to be 190/110 in ED  Troponins found to be slightly elevated with peak of 85  Cardiology consulted for further evaluation  Also endorses headache    Denies blurry vision, syncope, or LE edema  Inpatient consult to Cardiology  Consult performed by: Carlos Cruz PA-C  Consult ordered by: Rivas Moy PA-C          EKG: Normal sinus rhythm; incomplete right bundle branch block      Review of Systems   Constitutional: Negative for chills, diaphoresis and fever  HENT: Negative for ear pain and sore throat  Eyes: Negative for pain and visual disturbance  Respiratory: Negative for cough, chest tightness and shortness of breath  Cardiovascular: Positive for chest pain and palpitations  Negative for leg swelling  Gastrointestinal: Negative for abdominal pain and vomiting  Genitourinary: Negative for dysuria and hematuria  Musculoskeletal: Negative for arthralgias and back pain  Skin: Negative for color change and rash  Neurological: Positive for headaches  Negative for seizures and syncope  All other systems reviewed and are negative  Historical Information   Past Medical History:   Diagnosis Date   • Enlarged prostate    • Hyperlipidemia    • Hypertension    • Neuropathy      Past Surgical History:   Procedure Laterality Date   • KNEE ARTHROSCOPY Right    • WA CYSTOSCOPY,DIR VIS INT URETHROTOMY N/A 10/23/2020    Procedure: DIRECT VISUAL INTERAL URETHROTOMY (DVIU); Surgeon: Julien Gay MD;  [facility];  Service: Urology   • WA CYSTOURETHROSCOPY N/A 10/23/2020    Procedure: Gaila Beery;  Surgeon: Julien Gay MD;  [facility];  Service: Urology   • [place] KNEE [address] Left 6/2/2022    Procedure: Left knee arthroscopy  partial Medial menisectomy   Condroplasty ;  Surgeon: Katerina Meredith MD;  Location: [facility];  Service: Orthopedics   • TRANSURETHRAL RESECTION OF PROSTATE       Social History     Substance and Sexual Activity   Alcohol Use Never     Social History     Substance and Sexual Activity   Drug Use No     Social History     Tobacco Use   Smoking Status Never   Smokeless Tobacco Never Family History:   Family History   Problem Relation Age of Onset   • No Known Problems Mother    • No Known Problems Father        Meds/Allergies   all current active meds have been reviewed  No Known Allergies    Objective   Vitals: Blood pressure 132/85, pulse 68, temperature 98 °F (36 7 °C), resp  rate 16, height 5' 4" (1 626 m), weight 99 8 kg (220 lb), SpO2 97 %  , Body mass index is 37 76 kg/m² ,   Orthostatic Blood Pressures    Flowsheet Row Most Recent Value   Blood Pressure 132/85 filed at 03/07/2023 1010   Patient Position - Orthostatic VS Lying filed at 03/06/2023 5490          Systolic (57DLC), YFV:653 , Min:125 , OPF:593     Diastolic (10XCC), UQZ:02, Min:82, Max:110        Intake/Output Summary (Last 24 hours) at 3/7/2023 1024  Last data filed at 3/7/2023 0555  Gross per 24 hour   Intake 120 ml   Output 450 ml   Net -330 ml       Invasive Devices     Peripheral Intravenous Line  Duration           Peripheral IV 03/06/23 Distal;Right;Upper;Ventral (anterior) Arm <1 day                    Physical Exam:  Physical Exam  Vitals and nursing note reviewed  Constitutional:       General: He is not in acute distress  Appearance: He is well-developed  He is obese  He is not diaphoretic  HENT:      Head: Normocephalic and atraumatic  Nose: Nose normal    Eyes:      Conjunctiva/sclera: Conjunctivae normal    Cardiovascular:      Rate and Rhythm: Normal rate and regular rhythm  Pulses: Normal pulses  Heart sounds: Normal heart sounds  No murmur heard  No friction rub  Pulmonary:      Effort: Pulmonary effort is normal  No respiratory distress  Breath sounds: Normal breath sounds  No wheezing or rales  Chest:      Chest wall: No tenderness  Abdominal:      Palpations: Abdomen is soft  Tenderness: There is no abdominal tenderness  Musculoskeletal:         General: No swelling  Cervical back: Neck supple  Right lower leg: No edema        Left lower leg: No edema    Skin:     General: Skin is warm and dry  Capillary Refill: Capillary refill takes less than 2 seconds  Neurological:      Mental Status: He is alert     Psychiatric:         Mood and Affect: Mood normal           Lab Results:     Cardiac Profile:   Results from last 7 days   Lab Units 03/07/23  0110 03/06/23 2230 03/06/23 2002   HS TNI 0HR ng/L  --   --  85*   HS TNI 2HR ng/L  --  71*  --    HSTNI D2 ng/L  --  -14  --    HS TNI 4HR ng/L 68*  --   --    HSTNI D4 ng/L -17  --   --        CBC with diff:   Results from last 7 days   Lab Units 03/07/23  0532 03/06/23 2002   WBC Thousand/uL 8 07 9 15   HEMOGLOBIN g/dL 14 6 15 4   HEMATOCRIT % 42 0 44 6   MCV fL 85 86   PLATELETS Thousands/uL 195 206   MCH pg 29 4 29 6   MCHC g/dL 34 8 34 5   RDW % 12 9 12 7   MPV fL 10 0 10 5   NRBC AUTO /100 WBCs  --  0         CMP:   Results from last 7 days   Lab Units 03/07/23  0532 03/06/23 2002   POTASSIUM mmol/L 3 4* 4 1   CHLORIDE mmol/L 105 104   CO2 mmol/L 27 30   BUN mg/dL 19 13   CREATININE mg/dL 0 91 0 82   CALCIUM mg/dL 8 6 9 1   AST U/L 43*  --    ALT U/L 94*  --    ALK PHOS U/L 79  --    EGFR ml/min/1 73sq m 95 100

## 2023-03-08 ENCOUNTER — TRANSITIONAL CARE MANAGEMENT (OUTPATIENT)
Dept: INTERNAL MEDICINE CLINIC | Facility: CLINIC | Age: 53
End: 2023-03-08

## 2023-03-08 ENCOUNTER — TELEPHONE (OUTPATIENT)
Dept: CARDIOLOGY CLINIC | Facility: CLINIC | Age: 53
End: 2023-03-08

## 2023-03-08 VITALS
TEMPERATURE: 98 F | SYSTOLIC BLOOD PRESSURE: 143 MMHG | HEART RATE: 71 BPM | OXYGEN SATURATION: 95 % | BODY MASS INDEX: 37.56 KG/M2 | WEIGHT: 220 LBS | HEIGHT: 64 IN | DIASTOLIC BLOOD PRESSURE: 91 MMHG | RESPIRATION RATE: 17 BRPM

## 2023-03-08 RX ORDER — AMLODIPINE BESYLATE 5 MG/1
5 TABLET ORAL DAILY
Qty: 30 TABLET | Refills: 0 | Status: SHIPPED | OUTPATIENT
Start: 2023-03-08

## 2023-03-08 RX ORDER — HYDROCHLOROTHIAZIDE 12.5 MG/1
12.5 CAPSULE, GELATIN COATED ORAL DAILY
Qty: 30 CAPSULE | Refills: 0 | Status: SHIPPED | OUTPATIENT
Start: 2023-03-08 | End: 2023-03-10 | Stop reason: SDUPTHER

## 2023-03-08 RX ORDER — VALSARTAN 320 MG/1
320 TABLET ORAL DAILY
Qty: 30 TABLET | Refills: 0 | Status: SHIPPED | OUTPATIENT
Start: 2023-03-08

## 2023-03-08 RX ORDER — METOPROLOL SUCCINATE 25 MG/1
25 TABLET, EXTENDED RELEASE ORAL DAILY
Qty: 30 TABLET | Refills: 0 | Status: SHIPPED | OUTPATIENT
Start: 2023-03-08

## 2023-03-08 RX ADMIN — AMLODIPINE BESYLATE 5 MG: 5 TABLET ORAL at 08:27

## 2023-03-08 RX ADMIN — LOSARTAN POTASSIUM 100 MG: 50 TABLET, FILM COATED ORAL at 08:27

## 2023-03-08 RX ADMIN — ASPIRIN 81 MG: 81 TABLET, COATED ORAL at 08:27

## 2023-03-08 RX ADMIN — METOPROLOL SUCCINATE 25 MG: 25 TABLET, EXTENDED RELEASE ORAL at 08:27

## 2023-03-08 RX ADMIN — OMEGA-3 FATTY ACIDS CAP 1000 MG 1000 MG: 1000 CAP at 08:27

## 2023-03-08 RX ADMIN — HYDROCHLOROTHIAZIDE 12.5 MG: 12.5 TABLET ORAL at 08:27

## 2023-03-08 NOTE — PLAN OF CARE
Problem: PAIN - ADULT  Goal: Verbalizes/displays adequate comfort level or baseline comfort level  Description: Interventions:  - Encourage patient to monitor pain and request assistance  - Assess pain using appropriate pain scale  - Administer analgesics based on type and severity of pain and evaluate response  - Implement non-pharmacological measures as appropriate and evaluate response  - Consider cultural and social influences on pain and pain management  - Notify physician/advanced practitioner if interventions unsuccessful or patient reports new pain  3/8/2023 0839 by Celia Mota RN  Outcome: Progressing  3/7/2023 1859 by Celia Mota RN  Outcome: Progressing     Problem: INFECTION - ADULT  Goal: Absence or prevention of progression during hospitalization  Description: INTERVENTIONS:  - Assess and monitor for signs and symptoms of infection  - Monitor lab/diagnostic results  - Monitor all insertion sites, i e  indwelling lines, tubes, and drains  - Monitor endotracheal if appropriate and nasal secretions for changes in amount and color  - [place] appropriate cooling/warming therapies per order  - Administer medications as ordered  - Instruct and encourage patient and family to use good hand hygiene technique  - Identify and instruct in appropriate isolation precautions for identified infection/condition  3/8/2023 0839 by Celia Mota RN  Outcome: Progressing  3/7/2023 1859 by Celia Mota RN  Outcome: Progressing  Goal: Absence of fever/infection during neutropenic period  Description: INTERVENTIONS:  - Monitor WBC    3/8/2023 0839 by Celia Mota RN  Outcome: Progressing  3/7/2023 1859 by Celia Mota RN  Outcome: Progressing     Problem: SAFETY ADULT  Goal: Patient will remain free of falls  Description: INTERVENTIONS:  - Educate patient/family on patient safety including physical limitations  - Instruct patient to call for assistance with activity   - Consult OT/PT to assist with strengthening/mobility   - Keep Call bell within reach  - Keep bed low and locked with side rails adjusted as appropriate  - Keep care items and personal belongings within reach  - Initiate and maintain comfort rounds  - Make Fall Risk Sign visible to staff  - Offer Toileting every 2 Hours, in advance of need  - Initiate/Maintain bed alarm  - Obtain necessary fall risk management equipment  - Apply yellow socks and bracelet for high fall risk patients  - Consider moving patient to room near nurses station  3/8/2023 0839 by Em Sanchez RN  Outcome: Progressing  3/7/2023 1859 by Em Sanchez RN  Outcome: Progressing  Goal: Maintain or return to baseline ADL function  Description: INTERVENTIONS:  -  Assess patient's ability to carry out ADLs; assess patient's baseline for ADL function and identify physical deficits which impact ability to perform ADLs (bathing, care of mouth/teeth, toileting, grooming, dressing, etc )  - Assess/evaluate cause of self-care deficits   - Assess range of motion  - Assess patient's mobility; develop plan if impaired  - Assess patient's need for assistive devices and provide as appropriate  - Encourage maximum independence but intervene and supervise when necessary  - Involve family in performance of ADLs  - Assess for home care needs following discharge   - Consider OT consult to assist with ADL evaluation and planning for discharge  - Provide patient education as appropriate  3/8/2023 0839 by Em Sanchez RN  Outcome: Progressing  3/7/2023 1859 by Em Sanchez RN  Outcome: Progressing  Goal: Maintains/Returns to pre admission functional level  Description: INTERVENTIONS:  - Perform BMAT or MOVE assessment daily    - Set and communicate daily mobility goal to care team and patient/family/caregiver  - Collaborate with rehabilitation services on mobility goals if consulted  - Perform Range of Motion 3 times a day  - Reposition patient every 2 hours    - Dangle patient 3 times a day  - Stand patient 3 times a day  - Ambulate patient 3 times a day  - Out of bed to chair 3 times a day   - Out of bed for meals 3 times a day  - Out of bed for toileting  - Record patient progress and toleration of activity level   3/8/2023 0839 by Robby Madera RN  Outcome: Progressing  3/7/2023 1859 by Robby Madera RN  Outcome: Progressing     Problem: DISCHARGE PLANNING  Goal: Discharge to home or other facility with appropriate resources  Description: INTERVENTIONS:  - Identify barriers to discharge w/patient and caregiver  - Arrange for needed discharge resources and transportation as appropriate  - Identify discharge learning needs (meds, wound care, etc )  - Arrange for interpretive services to assist at discharge as needed  - Refer to Case Management Department for coordinating discharge planning if the patient needs post-hospital services based on physician/advanced practitioner order or complex needs related to functional status, cognitive ability, or social support system  3/8/2023 0839 by Robby Madera RN  Outcome: Progressing  3/7/2023 1859 by Robby Madera RN  Outcome: Progressing     Problem: Knowledge Deficit  Goal: Patient/family/caregiver demonstrates understanding of disease process, treatment plan, medications, and discharge instructions  Description: Complete learning assessment and assess knowledge base    Interventions:  - Provide teaching at level of understanding  - Provide teaching via preferred learning methods  3/8/2023 0839 by Robby Madera RN  Outcome: Progressing  3/7/2023 1859 by Robby Madera RN  Outcome: Progressing     Problem: CARDIOVASCULAR - ADULT  Goal: Maintains optimal cardiac output and hemodynamic stability  Description: INTERVENTIONS:  - Monitor I/O, vital signs and rhythm  - Monitor for S/S and trends of decreased cardiac output  - Administer and titrate ordered vasoactive medications to optimize hemodynamic stability  - Assess quality of pulses, skin color and temperature  - Assess for signs of decreased coronary artery perfusion  - Instruct patient to report change in severity of symptoms  3/8/2023 0839 by Katina Mayorga RN  Outcome: Progressing  3/7/2023 1859 by Katina Mayorga RN  Outcome: Progressing  Goal: Absence of cardiac dysrhythmias or at baseline rhythm  Description: INTERVENTIONS:  - Continuous cardiac monitoring, vital signs, obtain 12 lead EKG if ordered  - Administer antiarrhythmic and heart rate control medications as ordered  - Monitor electrolytes and administer replacement therapy as ordered  3/8/2023 0839 by Katina Mayorga RN  Outcome: Progressing  3/7/2023 1859 by Katina Mayorga RN  Outcome: Progressing

## 2023-03-08 NOTE — TELEPHONE ENCOUNTER
----- Message from Milagros Valencia PA-C sent at 3/7/2023  4:46 PM EST -----  [address] f/u  Patient clinical visit in 1 month at the [address] [place] [address] location: [facility] office       Schedule visit with Cardio Hossein Providers: first available provider  Type of Visit: VISIT TYPE: in-person office visit      Test ordered: Cardiac tests: stress echo test     Additional Details: Chest pain, hypertensive urgency

## 2023-03-08 NOTE — INCIDENTAL FINDINGS
The following findings require follow up:  Radiographic finding     Findin 1 cm left thyroid lobe hypodensity  5 mm right lower lobe nodule      Follow up required: YES    Please notify the following clinician to assist with the follow up with your primary care provider for thyroid ultrasound as well as surveillance for pulmonary nodule

## 2023-03-10 ENCOUNTER — OFFICE VISIT (OUTPATIENT)
Dept: INTERNAL MEDICINE CLINIC | Facility: CLINIC | Age: 53
End: 2023-03-10

## 2023-03-10 VITALS
HEART RATE: 74 BPM | SYSTOLIC BLOOD PRESSURE: 138 MMHG | RESPIRATION RATE: 16 BRPM | OXYGEN SATURATION: 98 % | TEMPERATURE: 98 F | HEIGHT: 64 IN | DIASTOLIC BLOOD PRESSURE: 80 MMHG | BODY MASS INDEX: 38 KG/M2 | WEIGHT: 222.6 LBS

## 2023-03-10 DIAGNOSIS — I10 HIGH BLOOD PRESSURE: ICD-10-CM

## 2023-03-10 DIAGNOSIS — E78.2 MIXED HYPERLIPIDEMIA: ICD-10-CM

## 2023-03-10 DIAGNOSIS — I10 PRIMARY HYPERTENSION: Primary | ICD-10-CM

## 2023-03-10 RX ORDER — ATORVASTATIN CALCIUM 40 MG/1
40 TABLET, FILM COATED ORAL
Qty: 90 TABLET | Refills: 1 | Status: SHIPPED | OUTPATIENT
Start: 2023-03-10

## 2023-03-10 RX ORDER — HYDROCHLOROTHIAZIDE 12.5 MG/1
12.5 CAPSULE, GELATIN COATED ORAL DAILY
Qty: 90 CAPSULE | Refills: 1 | Status: SHIPPED | OUTPATIENT
Start: 2023-03-10

## 2023-03-10 NOTE — PROGRESS NOTES
INTERNAL MEDICINE FOLLOW-UP VISIT  [facility] - [facility] [address] [place] [address]    NAME: Fernando Esteban  AGE: 48 y o  SEX: male  : 1970     DATE: 3/10/2023     Assessment and Plan:   1  Primary hypertension  Stable in the office  Discussed salt and the effects on blood pressure  Limit salt to no more than 2000 mg per day  Read food labels  Stay well hydrated with at least 70 oz of water per day  Discussed side effects of each BP medication, mechanism of action  - hydrochlorothiazide (MICROZIDE) 12 5 mg capsule; Take 1 capsule (12 5 mg total) by mouth daily  Dispense: 90 capsule; Refill: 1  - atorvastatin (LIPITOR) 40 mg tablet; Take 1 tablet (40 mg total) by mouth daily with dinner  Dispense: 90 tablet; Refill: 1          No follow-ups on file  Chief Complaint:     Chief Complaint   Patient presents with   • Follow-up     ER 3/8 Chest Pain      History of Present Illness:     Patient is here today for a follow up after being admitted due to elevated BP  He speaks very little english  Interpretor services were used through the visit  The following portions of the patient's history were reviewed and updated as appropriate: allergies, current medications, past family history, past medical history, past social history, past surgical history and problem list      Review of Systems:     Review of Systems   Constitutional: Negative for appetite change, chills, diaphoresis, fatigue, fever and unexpected weight change  HENT: Negative for postnasal drip and sneezing  Eyes: Negative for visual disturbance  Respiratory: Negative for chest tightness and shortness of breath  Cardiovascular: Negative for chest pain, palpitations and leg swelling  Gastrointestinal: Negative for abdominal pain and blood in stool  Endocrine: Negative for cold intolerance, heat intolerance, polydipsia, polyphagia and polyuria     Genitourinary: Negative for difficulty urinating, dysuria, frequency and urgency  Musculoskeletal: Negative for arthralgias and myalgias  Skin: Negative for rash and wound  Neurological: Negative for dizziness, weakness, light-headedness and headaches  Hematological: Negative for adenopathy  Psychiatric/Behavioral: Negative for confusion, dysphoric mood and sleep disturbance  The patient is not nervous/anxious           Past Medical History:     Past Medical History:   Diagnosis Date   • Enlarged prostate    • Hyperlipidemia    • Hypertension    • Neuropathy         Current Medications:     Current Outpatient Medications:   •  amLODIPine (NORVASC) 5 mg tablet, Take 1 tablet (5 mg total) by mouth daily, Disp: 30 tablet, Rfl: 0  •  aspirin (ECOTRIN LOW STRENGTH) 81 mg EC tablet, Take 1 tablet (81 mg total) by mouth daily Do not start before March 8, 2023 , Disp: 30 tablet, Rfl: 0  •  atorvastatin (LIPITOR) 40 mg tablet, Take 1 tablet (40 mg total) by mouth daily with dinner, Disp: 30 tablet, Rfl: 0  •  celecoxib (CeleBREX) 100 mg capsule, Take 1 capsule (100 mg total) by mouth 2 (two) times a day, Disp: 30 capsule, Rfl: 0  •  hydrochlorothiazide (MICROZIDE) 12 5 mg capsule, Take 1 capsule (12 5 mg total) by mouth daily, Disp: 30 capsule, Rfl: 0  •  metoprolol succinate (Toprol XL) 25 mg 24 hr tablet, Take 1 tablet (25 mg total) by mouth daily, Disp: 30 tablet, Rfl: 0  •  Omega-3 Fatty Acids (fish oil) 1,000 mg, Take 1 capsule (1,000 mg total) by mouth daily Do not start before March 8, 2023 , Disp: 30 capsule, Rfl: 0  •  valsartan (DIOVAN) 320 MG tablet, Take 1 tablet (320 mg total) by mouth daily, Disp: 30 tablet, Rfl: 0     Allergies:   No Known Allergies     Physical Exam:     /80 (BP Location: Left arm, Patient Position: Sitting, Cuff Size: Standard)   Pulse 74   Temp 98 °F (36 7 °C) (Tympanic)   Resp 16   Ht 5' 4" (1 626 m)   Wt 101 kg (222 lb 9 6 oz)   SpO2 98%   BMI 38 21 kg/m²     Physical Exam  Constitutional:       Appearance: He is well-developed  HENT:      Head: Normocephalic and atraumatic  Eyes:      Conjunctiva/sclera: Conjunctivae normal       Pupils: Pupils are equal, round, and reactive to light  Cardiovascular:      Rate and Rhythm: Normal rate and regular rhythm  Heart sounds: Normal heart sounds  Pulmonary:      Effort: Pulmonary effort is normal       Breath sounds: Normal breath sounds  Abdominal:      General: Bowel sounds are normal       Palpations: Abdomen is soft  Musculoskeletal:         General: Normal range of motion  Cervical back: Normal range of motion  Skin:     General: Skin is warm and dry  Neurological:      Mental Status: He is alert and oriented to person, place, and time  Data:     Laboratory Results: I have personally reviewed the pertinent laboratory results/reports   Radiology/Other Diagnostic Testing Results: I have personally reviewed pertinent reports        MICHELLE Mccabe  [facility] [address] [place] [address]

## 2023-05-01 DIAGNOSIS — I10 PRIMARY HYPERTENSION: ICD-10-CM

## 2023-05-02 RX ORDER — AMLODIPINE BESYLATE 5 MG/1
5 TABLET ORAL DAILY
Qty: 90 TABLET | Refills: 1 | Status: SHIPPED | OUTPATIENT
Start: 2023-05-02

## 2023-05-02 RX ORDER — METOPROLOL SUCCINATE 25 MG/1
25 TABLET, EXTENDED RELEASE ORAL DAILY
Qty: 90 TABLET | Refills: 1 | Status: SHIPPED | OUTPATIENT
Start: 2023-05-02

## 2023-05-11 ENCOUNTER — VBI (OUTPATIENT)
Dept: ADMINISTRATIVE | Facility: OTHER | Age: 53
End: 2023-05-11

## 2023-06-20 DIAGNOSIS — I10 PRIMARY HYPERTENSION: ICD-10-CM

## 2023-06-21 RX ORDER — VALSARTAN 320 MG/1
320 TABLET ORAL DAILY
Qty: 30 TABLET | Refills: 0 | Status: SHIPPED | OUTPATIENT
Start: 2023-06-21

## 2023-07-10 ENCOUNTER — TELEPHONE (OUTPATIENT)
Age: 53
End: 2023-07-10

## 2023-07-10 DIAGNOSIS — I10 PRIMARY HYPERTENSION: ICD-10-CM

## 2023-07-10 RX ORDER — VALSARTAN 320 MG/1
320 TABLET ORAL DAILY
Qty: 90 TABLET | Refills: 0 | Status: CANCELLED | OUTPATIENT
Start: 2023-07-10

## 2023-07-10 NOTE — TELEPHONE ENCOUNTER
Needs 90 day supply valsartan (DIOVAN) 320 MG tablet sent over to [facility] on [facility].  thanks

## 2023-07-11 NOTE — TELEPHONE ENCOUNTER
SPOKE TO THE WIFE \SHE ASK WHY DOES HE HAVE TO BE SEEN. I INFORMED HER THAT WHAT DOCTOR MARLENE WANTS. SO SHE SAID SHE WILL BE SEEING ANOTHER PCP.

## 2023-07-13 ENCOUNTER — OFFICE VISIT (OUTPATIENT)
Dept: INTERNAL MEDICINE CLINIC | Facility: CLINIC | Age: 53
End: 2023-07-13
Payer: COMMERCIAL

## 2023-07-13 VITALS
HEART RATE: 77 BPM | OXYGEN SATURATION: 96 % | HEIGHT: 64 IN | BODY MASS INDEX: 37.9 KG/M2 | SYSTOLIC BLOOD PRESSURE: 152 MMHG | DIASTOLIC BLOOD PRESSURE: 90 MMHG | RESPIRATION RATE: 12 BRPM | WEIGHT: 222 LBS

## 2023-07-13 DIAGNOSIS — E78.2 MIXED HYPERLIPIDEMIA: ICD-10-CM

## 2023-07-13 DIAGNOSIS — I10 PRIMARY HYPERTENSION: Primary | ICD-10-CM

## 2023-07-13 DIAGNOSIS — R51.9 NONINTRACTABLE HEADACHE, UNSPECIFIED CHRONICITY PATTERN, UNSPECIFIED HEADACHE TYPE: ICD-10-CM

## 2023-07-13 PROCEDURE — 99214 OFFICE O/P EST MOD 30 MIN: CPT | Performed by: INTERNAL MEDICINE

## 2023-07-13 RX ORDER — VALSARTAN 320 MG/1
320 TABLET ORAL DAILY
Qty: 90 TABLET | Refills: 1 | Status: SHIPPED | OUTPATIENT
Start: 2023-07-13

## 2023-07-13 RX ORDER — ATENOLOL 25 MG/1
25 TABLET ORAL DAILY
Qty: 90 TABLET | Refills: 1 | Status: SHIPPED | OUTPATIENT
Start: 2023-07-13

## 2023-07-13 NOTE — PROGRESS NOTES
Assessment/Plan:     Clarified which medicines he was presently taking. We will resume the valsartan. Try a different beta-blocker. Stop the amlodipine because they thought it was the source of his headaches. Never started the water pill. Ordered labs. Quality Measures:       Return in about 3 weeks (around 8/3/2023) for Recheck. No problem-specific Assessment & Plan notes found for this encounter. Diagnoses and all orders for this visit:    Primary hypertension  -     valsartan (DIOVAN) 320 MG tablet; Take 1 tablet (320 mg total) by mouth daily  -     atenolol (TENORMIN) 25 mg tablet; Take 1 tablet (25 mg total) by mouth daily  -     CBC and differential; Future  -     Comprehensive metabolic panel; Future  -     TSH, 3rd generation with Free T4 reflex; Future    Mixed hyperlipidemia  -     Lipid panel; Future    Nonintractable headache, unspecified chronicity pattern, unspecified headache type  -     Sedimentation rate, automated; Future          Subjective:      Patient ID: Calvin Boas is a 48 y.o. male. Patient comes in today for first-time visit here. His PCP is retiring. He has had a difficult course with his blood pressure. He is with his wife. They report that he did well with the valsartan hand but ran out a few weeks ago. But it sounds like his blood pressure was not controlled with that alone. Amlodipine was added. They thought that was giving more headaches. Because of that he was started on metoprolol for migraines. That cut down the headaches but then he wanted up in the ER with an episode of chest pain. His wife is nervous that the metoprolol contributed to that. In the hospital they added hydrochlorothiazide but he never started that. Today his pressure is elevated but he has not had his valsartan. He also does not want to take the cholesterol medicine. They are worried about side effects.       ALLERGIES:  No Known Allergies    CURRENT MEDICATIONS:    Current Outpatient Medications:   •  aspirin (ECOTRIN LOW STRENGTH) 81 mg EC tablet, Take 1 tablet (81 mg total) by mouth daily Do not start before March 8, 2023., Disp: 30 tablet, Rfl: 0  •  atenolol (TENORMIN) 25 mg tablet, Take 1 tablet (25 mg total) by mouth daily, Disp: 90 tablet, Rfl: 1  •  atorvastatin (LIPITOR) 40 mg tablet, Take 1 tablet (40 mg total) by mouth daily with dinner, Disp: 90 tablet, Rfl: 1  •  celecoxib (CeleBREX) 100 mg capsule, Take 1 capsule (100 mg total) by mouth 2 (two) times a day, Disp: 30 capsule, Rfl: 0  •  Omega-3 Fatty Acids (fish oil) 1,000 mg, Take 1 capsule (1,000 mg total) by mouth daily Do not start before March 8, 2023., Disp: 30 capsule, Rfl: 0  •  valsartan (DIOVAN) 320 MG tablet, Take 1 tablet (320 mg total) by mouth daily, Disp: 90 tablet, Rfl: 1    ACTIVE PROBLEM LIST:  Patient Active Problem List   Diagnosis   • Prostatic disorder   • HTN (hypertension)   • BPH (benign prostatic hyperplasia)   • HLD (hyperlipidemia)   • Obesity   • Complex tear of medial meniscus of left knee   • Osteoarthritis of left knee   • Postprocedural stricture of anterior urethra   • Chronic bladder pain   • Chest pain       PAST MEDICAL HISTORY:  Past Medical History:   Diagnosis Date   • Enlarged prostate    • Hyperlipidemia    • Hypertension    • Neuropathy        PAST SURGICAL HISTORY:  Past Surgical History:   Procedure Laterality Date   • KNEE ARTHROSCOPY Right    • NH ARTHROSCOPY KNEE DIAGNOSTIC W/WO SYNOVIAL BX SPX Left 6/2/2022    Procedure: Left knee arthroscopy. partial Medial menisectomy. Condroplasty.;  Surgeon: Nuris Kang MD;  Location: [facility];  Service: Orthopedics   • NH CYSTOURETHROSCOPY N/A 10/23/2020    Procedure: Gi Forts;  Surgeon: Lai Salter MD;  Location: [facility];  Service: Urology   • NH CYSTOURETHROSCOPY W/INTERNAL URETHROTOMY N/A 10/23/2020    Procedure: DIRECT VISUAL INTERAL URETHROTOMY (DVIU);   Surgeon: Lai Salter MD;  Location: [facility];  Service: Urology   • TRANSURETHRAL RESECTION OF PROSTATE         FAMILY HISTORY:  Family History   Problem Relation Age of Onset   • No Known Problems Mother    • No Known Problems Father        SOCIAL HISTORY:  Social History     Socioeconomic History   • Marital status: /Civil Union     Spouse name: Not on file   • Number of children: Not on file   • Years of education: Not on file   • Highest education level: Not on file   Occupational History   • Not on file   Tobacco Use   • Smoking status: Never   • Smokeless tobacco: Never   Vaping Use   • Vaping Use: Never used   Substance and Sexual Activity   • Alcohol use: Never   • Drug use: No   • Sexual activity: Yes     Partners: Female   Other Topics Concern   • Not on file   Social History Narrative   • Not on file     Social Determinants of Health     Financial Resource Strain: Not on file   Food Insecurity: No Food Insecurity (3/7/2023)    Hunger Vital Sign    • Worried About Running Out of Food in the Last Year: Never true    • Ran Out of Food in the Last Year: Never true   Transportation Needs: No Transportation Needs (3/7/2023)    PRAPARE - Transportation    • Lack of Transportation (Medical): No    • Lack of Transportation (Non-Medical): No   Physical Activity: Not on file   Stress: Not on file   Social Connections: Not on file   Intimate Partner Violence: Not on file   Housing Stability: Low Risk  (3/7/2023)    Housing Stability Vital Sign    • Unable to Pay for Housing in the Last Year: No    • Number of Places Lived in the Last Year: 1    • Unstable Housing in the Last Year: No       Review of Systems   Respiratory: Negative for shortness of breath. Cardiovascular: Negative for chest pain. Gastrointestinal: Negative for abdominal pain.          Objective:  Vitals:    07/13/23 1516   BP: 152/90   BP Location: Left arm   Patient Position: Sitting   Pulse: 77   Resp: 12   SpO2: 96%   Weight: 101 kg (222 lb)   Height: 5' 4" (1.626 m)     Body mass index is 38.11 kg/m². Physical Exam  Vitals and nursing note reviewed. Constitutional:       Appearance: Normal appearance. He is well-developed. He is obese. HENT:      Head: Atraumatic. Right Ear: External ear normal.      Left Ear: External ear normal.      Mouth/Throat:      Pharynx: No oropharyngeal exudate. Eyes:      Conjunctiva/sclera: Conjunctivae normal.      Pupils: Pupils are equal, round, and reactive to light. Cardiovascular:      Rate and Rhythm: Normal rate and regular rhythm. Heart sounds: Normal heart sounds. No murmur heard. Pulmonary:      Effort: Pulmonary effort is normal.      Breath sounds: Normal breath sounds. Abdominal:      Palpations: Abdomen is soft. Tenderness: There is no abdominal tenderness. Musculoskeletal:         General: Normal range of motion. Cervical back: Normal range of motion and neck supple. Lymphadenopathy:      Cervical: No cervical adenopathy. Skin:     General: Skin is warm and dry. Findings: No rash. Neurological:      Mental Status: He is alert and oriented to person, place, and time. Cranial Nerves: No cranial nerve deficit. Psychiatric:         Behavior: Behavior normal.           RESULTS:    In chart    This note was created with voice recognition software. Phonic, grammatical and spelling errors may be present within the note as a result.

## 2023-07-17 ENCOUNTER — TELEPHONE (OUTPATIENT)
Dept: INTERNAL MEDICINE CLINIC | Facility: CLINIC | Age: 53
End: 2023-07-17

## 2023-07-17 DIAGNOSIS — I10 PRIMARY HYPERTENSION: Primary | ICD-10-CM

## 2023-07-17 DIAGNOSIS — R73.01 IMPAIRED FASTING GLUCOSE: ICD-10-CM

## 2023-07-17 NOTE — TELEPHONE ENCOUNTER
Patient is asking if an HBA1C/sugars, vitamin D, Iron,  and a urine test could be added to the labs? In addition to this is asking about cardiac levels because he was in the hospital in March and wants them redone. In the past he was told he was close to being diabetic. Please advise. .. Patient also needs a prior authorization on the atenolol.     Call back #[phone]  Anamika/wife

## 2023-07-25 NOTE — TELEPHONE ENCOUNTER
Ana Laura Koehler MD  [facility] Internal Med Clinical 5 minutes ago (12:42 PM)       I can add the A1c and the urinalysis based on his medical problems.  The cardiac levels are not done that way.  I can explain further at his follow-up. Also, prior authorization for atenolol?

## 2023-09-22 ENCOUNTER — APPOINTMENT (OUTPATIENT)
Dept: RADIOLOGY | Facility: CLINIC | Age: 53
End: 2023-09-22
Payer: COMMERCIAL

## 2023-09-22 ENCOUNTER — OFFICE VISIT (OUTPATIENT)
Dept: OBGYN CLINIC | Facility: CLINIC | Age: 53
End: 2023-09-22
Payer: COMMERCIAL

## 2023-09-22 VITALS
DIASTOLIC BLOOD PRESSURE: 101 MMHG | RESPIRATION RATE: 18 BRPM | BODY MASS INDEX: 37.05 KG/M2 | HEIGHT: 64 IN | OXYGEN SATURATION: 98 % | HEART RATE: 60 BPM | SYSTOLIC BLOOD PRESSURE: 161 MMHG | WEIGHT: 217 LBS

## 2023-09-22 DIAGNOSIS — S83.241A TEAR OF MEDIAL MENISCUS OF RIGHT KNEE, CURRENT, UNSPECIFIED TEAR TYPE, INITIAL ENCOUNTER: ICD-10-CM

## 2023-09-22 DIAGNOSIS — M23.91 INTERNAL DERANGEMENT OF RIGHT KNEE: ICD-10-CM

## 2023-09-22 DIAGNOSIS — M25.561 RIGHT KNEE PAIN, UNSPECIFIED CHRONICITY: ICD-10-CM

## 2023-09-22 DIAGNOSIS — M25.561 RIGHT KNEE PAIN, UNSPECIFIED CHRONICITY: Primary | ICD-10-CM

## 2023-09-22 PROCEDURE — 99213 OFFICE O/P EST LOW 20 MIN: CPT | Performed by: ORTHOPAEDIC SURGERY

## 2023-09-22 PROCEDURE — 73562 X-RAY EXAM OF KNEE 3: CPT

## 2023-09-22 PROCEDURE — 20610 DRAIN/INJ JOINT/BURSA W/O US: CPT | Performed by: ORTHOPAEDIC SURGERY

## 2023-09-22 RX ORDER — BUPIVACAINE HYDROCHLORIDE 2.5 MG/ML
2 INJECTION, SOLUTION INFILTRATION; PERINEURAL
Status: COMPLETED | OUTPATIENT
Start: 2023-09-22 | End: 2023-09-22

## 2023-09-22 RX ORDER — CELECOXIB 100 MG/1
100 CAPSULE ORAL 2 TIMES DAILY
Qty: 60 CAPSULE | Refills: 0 | Status: SHIPPED | OUTPATIENT
Start: 2023-09-22

## 2023-09-22 RX ORDER — LIDOCAINE HYDROCHLORIDE 10 MG/ML
2 INJECTION, SOLUTION EPIDURAL; INFILTRATION; INTRACAUDAL; PERINEURAL
Status: COMPLETED | OUTPATIENT
Start: 2023-09-22 | End: 2023-09-22

## 2023-09-22 RX ORDER — METHYLPREDNISOLONE ACETATE 40 MG/ML
2 INJECTION, SUSPENSION INTRA-ARTICULAR; INTRALESIONAL; INTRAMUSCULAR; SOFT TISSUE
Status: COMPLETED | OUTPATIENT
Start: 2023-09-22 | End: 2023-09-22

## 2023-09-22 RX ADMIN — BUPIVACAINE HYDROCHLORIDE 2 ML: 2.5 INJECTION, SOLUTION INFILTRATION; PERINEURAL at 09:30

## 2023-09-22 RX ADMIN — METHYLPREDNISOLONE ACETATE 2 ML: 40 INJECTION, SUSPENSION INTRA-ARTICULAR; INTRALESIONAL; INTRAMUSCULAR; SOFT TISSUE at 09:30

## 2023-09-22 RX ADMIN — LIDOCAINE HYDROCHLORIDE 2 ML: 10 INJECTION, SOLUTION EPIDURAL; INFILTRATION; INTRACAUDAL; PERINEURAL at 09:30

## 2023-09-22 NOTE — PROGRESS NOTES
HPI:  Patient is a 48y.o. year old  male  who presents with chief complaint of right knee pain. The pain started about 3 weeks ago while he was working. 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 He has to walk up and down a lot of stairs. His pain today is rated 8/10. He has noticed clicking and locking. Symptoms increase if he stands from a seated position. He has a history of left knee partial medial menisectomy approximately 15 months ago. Symptoms are managed with ibuprofen. He has not tried physical therapy and does not think he will have time for this. ROS:   General: No fever, no chills, no weight loss, no weight gain  HEENT:  No loss of hearing, no nose bleeds, no sore throat  Eyes:  No eye pain, no red eyes, no visual disturbance  Respiratory:  No cough, no shortness of breath, no wheezing  Cardiovascular:  No chest pain, no palpitations, no edema  GI: No abdominal pain, no nausea, no vomiting  Endocrine: No frequent urination, no excessive thirst  Urinary:  No dysuria, no hematuria, no incontinence  Musculoskeletal: see HPI and PE  Skin:  No rash, no wounds  Neurological:  No dizziness, no headache, no numbness  Psychiatric:  No difficulty concentrating, no depression, no suicide thoughts, no anxiety  Review of all other systems is negative    PMH:  Past Medical History:   Diagnosis Date   • Enlarged prostate    • Hyperlipidemia    • Hypertension    • Neuropathy        PSH:  Past Surgical History:   Procedure Laterality Date   • KNEE ARTHROSCOPY Right    • [place] ARTHROSCOPY KNEE DIAGNOSTIC W/WO SYNOVIAL BX SPX Left 6/2/2022    Procedure: Left knee arthroscopy. partial Medial menisectomy.  Condroplasty.;  Surgeon: Ezequiel Rangel MD;  Location: [facility];  Service: Orthopedics   • [place] CYSTOURETHROSCOPY N/A 10/23/2020    Procedure: Tracy Ernandez;  Surgeon: Valorie Perry MD;  [facility];  Service: Urology   • [place] CYSTOURETHROSCOPY W/INTERNAL URETHROTOMY N/A 10/23/2020    Procedure: DIRECT VISUAL INTERAL URETHROTOMY (DVIU); Surgeon: Pamela Valera MD;  [facility];  Service: Urology   • TRANSURETHRAL RESECTION OF PROSTATE         Medications:  Current Outpatient Medications   Medication Sig Dispense Refill   • atenolol (TENORMIN) 25 mg tablet Take 1 tablet (25 mg total) by mouth daily 90 tablet 1   • Omega-3 Fatty Acids (fish oil) 1,000 mg Take 1 capsule (1,000 mg total) by mouth daily Do not start before March 8, 2023. 30 capsule 0   • valsartan (DIOVAN) 320 MG tablet Take 1 tablet (320 mg total) by mouth daily 90 tablet 1     No current facility-administered medications for this visit. Allergies:  No Known Allergies    Family History:  Family History   Problem Relation Age of Onset   • No Known Problems Mother    • No Known Problems Father        Social History:  Social History     Occupational History   • Not on file   Tobacco Use   • Smoking status: Never   • Smokeless tobacco: Never   Vaping Use   • Vaping Use: Never used   Substance and Sexual Activity   • Alcohol use: Never   • Drug use: No   • Sexual activity: Yes     Partners: Female       Physical Exam:  General :  Alert, cooperative, no distress, appears stated age  Blood pressure (!) 161/101, pulse 60, resp. rate 18, height 5' 4" (1.626 m), weight 98.4 kg (217 lb), SpO2 98 %. Head:  Normocephalic, without obvious abnormality, atraumatic   Eyes:  Conjunctiva/corneas clear, EOM's intact,   Ears: Both ears normal appearance, no hearing deficits.     Nose: Nares normal, septum midline, no drainage    Neck: Supple,  trachea midline, no adenopathy, no tenderness, no mass   Back:   Symmetric, no curvature, ROM normal, no tenderness   Lungs:   Respirations unlabored   Chest Wall:  No tenderness or deformity   Extremities: Extremities normal, atraumatic, no cyanosis or edema      Pulses: 2+ and symmetric   Skin: Skin color, texture, turgor normal, no rashes or lesions      Neurologic: Normal           Ortho Exam  Right Knee  Range of motion from  0 to 120. There is no crepitus with range of motion. There is no effusion. There is tenderness over the posteriomedial joint line. Non tender over adductor tuberosity    There is 5/5 quadriceps strength and equal tone. The patient is able to perform a straight leg raise. negative patellar grind test.  Anterior drawer tests is negative  negative Lachman Test.   Posterior drawer test is   negative   Varus stress testing reveals no instability at 0 and 30 degrees   Valgus stress testing reveals no instability, mild pain at 0 and 30 degrees  Nickie's testing is positive    The patient is neurovascular intact distally. Imaging Studies: The following imaging studies were reviewed in office today. My findings are noted. X-rays taken 9/22/23 of the right knee demonstrate no acute fracture or dislocation    Assessment  Encounter Diagnoses   Name Primary? • Right knee pain, unspecified chronicity Yes   • Internal derangement of right knee    • Tear of medial meniscus of right knee, current, unspecified tear type, initial encounter      Large joint arthrocentesis: R knee  Universal Protocol:  Consent: Verbal consent obtained. Risks and benefits: risks, benefits and alternatives were discussed  Consent given by: patient  Time out: Immediately prior to procedure a "time out" was called to verify the correct patient, procedure, equipment, support staff and site/side marked as required.   Patient understanding: patient states understanding of the procedure being performed  Site marked: the operative site was marked  Patient identity confirmed: verbally with patient    Supporting Documentation  Indications: pain   Procedure Details  Location: knee - R knee  Preparation: Patient was prepped and draped in the usual sterile fashion  Needle size: 22 G  Ultrasound guidance: no  Approach: anterolateral  Medications administered: 2 mL bupivacaine 0.25 %; 2 mL methylPREDNISolone acetate 40 mg/mL; 2 mL lidocaine (PF) 1 %    Patient tolerance: patient tolerated the procedure well with no immediate complications  Dressing:  Sterile dressing applied            Plan:  48 y.o male with right knee pain, suspected internal derangement. Most likely a medial meniscal tear. Possible mild MCL sprain  · X-rays taken and reviewed in office today  · The patient was offered a corticosteroid injection for their right knee. They tolerated the procedure well. · The patient was educated they may have some irritation in the next few days and should rest, ice, elevate and perform gentle range of motion exercises. They were advised the medicine should begin to work in a few days time.    · Patient was given a prescription of Celebrex to their pharmacy on file  · Patient will follow up in 4 weeks for reevaluation and further treatment options    Scribe Attestation    I,:  Charles Rodriguez am acting as a scribe while in the presence of the attending physician.:       I,:  Amelia Cloud MD personally performed the services described in this documentation    as scribed in my presence.:

## 2023-10-18 ENCOUNTER — OFFICE VISIT (OUTPATIENT)
Dept: OBGYN CLINIC | Facility: CLINIC | Age: 53
End: 2023-10-18

## 2023-10-18 ENCOUNTER — APPOINTMENT (OUTPATIENT)
Age: 53
End: 2023-10-18
Payer: COMMERCIAL

## 2023-10-18 VITALS
BODY MASS INDEX: 36.88 KG/M2 | OXYGEN SATURATION: 98 % | HEIGHT: 64 IN | RESPIRATION RATE: 18 BRPM | SYSTOLIC BLOOD PRESSURE: 159 MMHG | WEIGHT: 216 LBS | HEART RATE: 69 BPM | DIASTOLIC BLOOD PRESSURE: 89 MMHG

## 2023-10-18 DIAGNOSIS — R51.9 NONINTRACTABLE HEADACHE, UNSPECIFIED CHRONICITY PATTERN, UNSPECIFIED HEADACHE TYPE: ICD-10-CM

## 2023-10-18 DIAGNOSIS — R53.83 OTHER FATIGUE: Primary | ICD-10-CM

## 2023-10-18 DIAGNOSIS — E78.2 MIXED HYPERLIPIDEMIA: ICD-10-CM

## 2023-10-18 DIAGNOSIS — M25.50 ARTHRALGIA, UNSPECIFIED JOINT: ICD-10-CM

## 2023-10-18 DIAGNOSIS — E56.9 VITAMIN DEFICIENCY: ICD-10-CM

## 2023-10-18 DIAGNOSIS — M23.91 INTERNAL DERANGEMENT OF RIGHT KNEE: ICD-10-CM

## 2023-10-18 DIAGNOSIS — R73.01 IMPAIRED FASTING GLUCOSE: ICD-10-CM

## 2023-10-18 DIAGNOSIS — I10 PRIMARY HYPERTENSION: ICD-10-CM

## 2023-10-18 DIAGNOSIS — S83.241A TEAR OF MEDIAL MENISCUS OF RIGHT KNEE, CURRENT, UNSPECIFIED TEAR TYPE, INITIAL ENCOUNTER: Primary | ICD-10-CM

## 2023-10-18 LAB
ALBUMIN SERPL BCP-MCNC: 4.4 G/DL (ref 3.5–5)
ALP SERPL-CCNC: 78 U/L (ref 34–104)
ALT SERPL W P-5'-P-CCNC: 70 U/L (ref 7–52)
ANION GAP SERPL CALCULATED.3IONS-SCNC: 7 MMOL/L
AST SERPL W P-5'-P-CCNC: 44 U/L (ref 13–39)
BACTERIA UR QL AUTO: ABNORMAL /HPF
BASOPHILS # BLD AUTO: 0.04 THOUSANDS/ÂΜL (ref 0–0.1)
BASOPHILS NFR BLD AUTO: 1 % (ref 0–1)
BILIRUB SERPL-MCNC: 0.79 MG/DL (ref 0.2–1)
BILIRUB UR QL STRIP: NEGATIVE
BUN SERPL-MCNC: 17 MG/DL (ref 5–25)
CALCIUM SERPL-MCNC: 9.1 MG/DL (ref 8.4–10.2)
CHLORIDE SERPL-SCNC: 103 MMOL/L (ref 96–108)
CHOLEST SERPL-MCNC: 235 MG/DL
CLARITY UR: ABNORMAL
CO2 SERPL-SCNC: 29 MMOL/L (ref 21–32)
COLOR UR: ABNORMAL
CREAT SERPL-MCNC: 0.78 MG/DL (ref 0.6–1.3)
EOSINOPHIL # BLD AUTO: 0.33 THOUSAND/ÂΜL (ref 0–0.61)
EOSINOPHIL NFR BLD AUTO: 6 % (ref 0–6)
ERYTHROCYTE [DISTWIDTH] IN BLOOD BY AUTOMATED COUNT: 13 % (ref 11.6–15.1)
ERYTHROCYTE [SEDIMENTATION RATE] IN BLOOD: 4 MM/HOUR (ref 0–19)
GFR SERPL CREATININE-BSD FRML MDRD: 103 ML/MIN/1.73SQ M
GLUCOSE P FAST SERPL-MCNC: 99 MG/DL (ref 65–99)
GLUCOSE UR STRIP-MCNC: NEGATIVE MG/DL
HCT VFR BLD AUTO: 46.3 % (ref 36.5–49.3)
HDLC SERPL-MCNC: 58 MG/DL
HGB BLD-MCNC: 15.8 G/DL (ref 12–17)
HGB UR QL STRIP.AUTO: NEGATIVE
IMM GRANULOCYTES # BLD AUTO: 0.01 THOUSAND/UL (ref 0–0.2)
IMM GRANULOCYTES NFR BLD AUTO: 0 % (ref 0–2)
KETONES UR STRIP-MCNC: NEGATIVE MG/DL
LDLC SERPL CALC-MCNC: 154 MG/DL (ref 0–100)
LEUKOCYTE ESTERASE UR QL STRIP: NEGATIVE
LYMPHOCYTES # BLD AUTO: 1.93 THOUSANDS/ÂΜL (ref 0.6–4.47)
LYMPHOCYTES NFR BLD AUTO: 33 % (ref 14–44)
MCH RBC QN AUTO: 29.6 PG (ref 26.8–34.3)
MCHC RBC AUTO-ENTMCNC: 34.1 G/DL (ref 31.4–37.4)
MCV RBC AUTO: 87 FL (ref 82–98)
MONOCYTES # BLD AUTO: 0.44 THOUSAND/ÂΜL (ref 0.17–1.22)
MONOCYTES NFR BLD AUTO: 7 % (ref 4–12)
MUCOUS THREADS UR QL AUTO: ABNORMAL
NEUTROPHILS # BLD AUTO: 3.18 THOUSANDS/ÂΜL (ref 1.85–7.62)
NEUTS SEG NFR BLD AUTO: 53 % (ref 43–75)
NITRITE UR QL STRIP: NEGATIVE
NON-SQ EPI CELLS URNS QL MICRO: ABNORMAL /HPF
NONHDLC SERPL-MCNC: 177 MG/DL
NRBC BLD AUTO-RTO: 0 /100 WBCS
PH UR STRIP.AUTO: 5.5 [PH]
PLATELET # BLD AUTO: 210 THOUSANDS/UL (ref 149–390)
PMV BLD AUTO: 12.1 FL (ref 8.9–12.7)
POTASSIUM SERPL-SCNC: 4 MMOL/L (ref 3.5–5.3)
PROT SERPL-MCNC: 7 G/DL (ref 6.4–8.4)
PROT UR STRIP-MCNC: ABNORMAL MG/DL
RBC # BLD AUTO: 5.33 MILLION/UL (ref 3.88–5.62)
RBC #/AREA URNS AUTO: ABNORMAL /HPF
SODIUM SERPL-SCNC: 139 MMOL/L (ref 135–147)
SP GR UR STRIP.AUTO: 1.02 (ref 1–1.03)
TRIGL SERPL-MCNC: 115 MG/DL
TSH SERPL DL<=0.05 MIU/L-ACNC: 1.54 UIU/ML (ref 0.45–4.5)
UROBILINOGEN UR STRIP-ACNC: <2 MG/DL
WBC # BLD AUTO: 5.93 THOUSAND/UL (ref 4.31–10.16)
WBC #/AREA URNS AUTO: ABNORMAL /HPF

## 2023-10-18 PROCEDURE — 84443 ASSAY THYROID STIM HORMONE: CPT

## 2023-10-18 PROCEDURE — 85025 COMPLETE CBC W/AUTO DIFF WBC: CPT

## 2023-10-18 PROCEDURE — 85652 RBC SED RATE AUTOMATED: CPT

## 2023-10-18 PROCEDURE — 36415 COLL VENOUS BLD VENIPUNCTURE: CPT

## 2023-10-18 PROCEDURE — 80061 LIPID PANEL: CPT

## 2023-10-18 PROCEDURE — 80053 COMPREHEN METABOLIC PANEL: CPT

## 2023-10-18 PROCEDURE — 81001 URINALYSIS AUTO W/SCOPE: CPT

## 2023-10-18 PROCEDURE — 83036 HEMOGLOBIN GLYCOSYLATED A1C: CPT

## 2023-10-18 NOTE — PROGRESS NOTES
HPI:  Patient is a 48y.o. year old  male  who presents for follow-up of right knee suspected medial meniscus tear and possible mild MCL sprain. Pain is rated 8/10. The patient was last seen in the office on 9/22/2023 where he was provided a corticosteroid injection, prescribed Celebrex. The patient reports to the office today for reevaluation. His knee is "so-so." He denies any locking catching or instability. He reports the corticosteroid injection provided 2 days of relief in symptoms. He has pain with walking, prolonged sitting and driving. 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 Pain is managed with ibuprofen as needed. He did not realize he was supposed to take Celebrex twice per day. He is accompanied by his wife who assisted with translation. ROS:   General: No fever, no chills, no weight loss, no weight gain  HEENT:  No loss of hearing, no nose bleeds, no sore throat  Eyes:  No eye pain, no red eyes, no visual disturbance  Respiratory:  No cough, no shortness of breath, no wheezing  Cardiovascular:  No chest pain, no palpitations, no edema  GI: No abdominal pain, no nausea, no vomiting  Endocrine: No frequent urination, no excessive thirst  Urinary:  No dysuria, no hematuria, no incontinence  Musculoskeletal: see HPI and PE  Skin:  No rash, no wounds  Neurological:  No dizziness, no headache, no numbness  Psychiatric:  No difficulty concentrating, no depression, no suicide thoughts, no anxiety  Review of all other systems is negative    PMH:  Past Medical History:   Diagnosis Date   • Enlarged prostate    • Hyperlipidemia    • Hypertension    • Neuropathy        PSH:  Past Surgical History:   Procedure Laterality Date   • KNEE ARTHROSCOPY Right    • [place] ARTHROSCOPY KNEE DIAGNOSTIC W/WO SYNOVIAL BX SPX Left 6/2/2022    Procedure: Left knee arthroscopy. partial Medial menisectomy.  Condroplasty.;  Surgeon: Romulo Cisse MD;  Location: [facility] OR;  Service: Orthopedics   • [place] CYSTOURETHROSCOPY N/A 10/23/2020    Procedure: Mills Delay;  Surgeon: Karyn Rushing MD;  [facility];  Service: Urology   • [place] CYSTOURETHROSCOPY W/INTERNAL URETHROTOMY N/A 10/23/2020    Procedure: DIRECT VISUAL INTERAL URETHROTOMY (DVIU); Surgeon: Karyn Rushing MD;  [facility];  Service: Urology   • TRANSURETHRAL RESECTION OF PROSTATE         Medications:  Current Outpatient Medications   Medication Sig Dispense Refill   • atenolol (TENORMIN) 25 mg tablet Take 1 tablet (25 mg total) by mouth daily 90 tablet 1   • celecoxib (CeleBREX) 100 mg capsule Take 1 capsule (100 mg total) by mouth 2 (two) times a day 60 capsule 0   • valsartan (DIOVAN) 320 MG tablet Take 1 tablet (320 mg total) by mouth daily 90 tablet 1     No current facility-administered medications for this visit. Allergies:  No Known Allergies    Family History:  Family History   Problem Relation Age of Onset   • No Known Problems Mother    • No Known Problems Father        Social History:  Social History     Occupational History   • Not on file   Tobacco Use   • Smoking status: Never   • Smokeless tobacco: Never   Vaping Use   • Vaping Use: Never used   Substance and Sexual Activity   • Alcohol use: Never   • Drug use: No   • Sexual activity: Yes     Partners: Female       Physical Exam:  General :  Alert, cooperative, no distress, appears stated age  Blood pressure 159/89, pulse 69, resp. rate 18, height 5' 4" (1.626 m), weight 98 kg (216 lb), SpO2 98 %. Head:  Normocephalic, without obvious abnormality, atraumatic   Eyes:  Conjunctiva/corneas clear, EOM's intact,   Ears: Both ears normal appearance, no hearing deficits.     Nose: Nares normal, septum midline, no drainage    Neck: Supple,  trachea midline, no adenopathy, no tenderness, no mass   Back:   Symmetric, no curvature, ROM normal, no tenderness   Lungs:   Respirations unlabored   Chest Wall:  No tenderness or deformity   Extremities: Extremities normal, atraumatic, no cyanosis or edema      Pulses: 2+ and symmetric   Skin: Skin color, texture, turgor normal, no rashes or lesions      Neurologic: Normal           Ortho Exam  Right Knee   Alignment neutral   There is no effusion. There is tenderness over the medial joint line and proximal MCL insertion  Range of motion from  0 to 120. There is no crepitus with range of motion. There is 5/5 quadriceps strength and equal tone. The patient is able to perform a straight leg raise. negative patellar grind test.  Anterior drawer tests is negative  negative Lachman Test.   Posterior drawer test is  negative   Varus stress testing reveals no instability at 0 and 30 degrees   Valgus stress testing reveals no instability, mild pain at 0 and 30 degrees  Nickie's testing is positive    The patient is neurovascular intact distally. Imaging Studies: The following imaging studies were reviewed in office today. My findings are noted. No new imaging. Assessment  Encounter Diagnoses   Name Primary? • Tear of medial meniscus of right knee, current, unspecified tear type, initial encounter Yes   • Internal derangement of right knee            Plan:  Giancarlo Batres is a 48 y.o. male  with right knee pain, suspected medial meniscal tear and mild MCL sprain  The patient has failed conservative treatment including over the counter analgesics, corticosteroid injection, activity modification without relief in symptoms. He was provided a hinged knee brace for stability during ambulation. An MRI study was ordered for further evaluation. He was instructed to take Celebrex as directed, 100 mg twice per day. Follow-up once MRI results are available.       Scribe Attestation    I,:  Bailey Amaya am acting as a scribe while in the presence of the attending physician.:       I,:  Roxie García MD personally performed the services described in this documentation    as scribed in my presence.:

## 2023-10-19 LAB
EST. AVERAGE GLUCOSE BLD GHB EST-MCNC: 169 MG/DL
HBA1C MFR BLD: 7.5 %

## 2023-10-27 ENCOUNTER — HOSPITAL ENCOUNTER (OUTPATIENT)
Dept: MRI IMAGING | Facility: CLINIC | Age: 53
Discharge: HOME | End: 2023-10-27
Payer: COMMERCIAL

## 2023-10-27 DIAGNOSIS — M23.91 INTERNAL DERANGEMENT OF RIGHT KNEE: ICD-10-CM

## 2023-10-27 PROCEDURE — G1004 CDSM NDSC: HCPCS

## 2023-10-27 PROCEDURE — 73721 MRI JNT OF LWR EXTRE W/O DYE: CPT

## 2023-10-31 ENCOUNTER — TELEPHONE (OUTPATIENT)
Age: 53
End: 2023-10-31

## 2023-10-31 NOTE — TELEPHONE ENCOUNTER
Caller: [facility]    Doctor: Bryan Ramírez    Reason for call: called with results of right knee MRI     Call back#: N/A

## 2023-11-03 ENCOUNTER — TELEPHONE (OUTPATIENT)
Dept: OBGYN CLINIC | Facility: CLINIC | Age: 53
End: 2023-11-03

## 2023-11-10 ENCOUNTER — OFFICE VISIT (OUTPATIENT)
Dept: OBGYN CLINIC | Facility: CLINIC | Age: 53
End: 2023-11-10
Payer: COMMERCIAL

## 2023-11-10 VITALS
HEART RATE: 77 BPM | HEIGHT: 64 IN | OXYGEN SATURATION: 95 % | SYSTOLIC BLOOD PRESSURE: 157 MMHG | WEIGHT: 216 LBS | DIASTOLIC BLOOD PRESSURE: 97 MMHG | BODY MASS INDEX: 36.88 KG/M2 | RESPIRATION RATE: 18 BRPM

## 2023-11-10 DIAGNOSIS — M71.21 BAKER'S CYST OF KNEE, RIGHT: ICD-10-CM

## 2023-11-10 DIAGNOSIS — S83.241A OTHER TEAR OF MEDIAL MENISCUS OF RIGHT KNEE AS CURRENT INJURY, INITIAL ENCOUNTER: Primary | ICD-10-CM

## 2023-11-10 DIAGNOSIS — S83.411A SPRAIN OF MEDIAL COLLATERAL LIGAMENT OF RIGHT KNEE, INITIAL ENCOUNTER: ICD-10-CM

## 2023-11-10 DIAGNOSIS — G89.29 CHRONIC PAIN OF RIGHT KNEE: ICD-10-CM

## 2023-11-10 DIAGNOSIS — M25.561 CHRONIC PAIN OF RIGHT KNEE: ICD-10-CM

## 2023-11-10 PROCEDURE — 99213 OFFICE O/P EST LOW 20 MIN: CPT | Performed by: ORTHOPAEDIC SURGERY

## 2023-11-10 NOTE — PROGRESS NOTES
HPI:  Patient is a 48y.o. year old  male  who presents for follow-up of right knee suspected medial meniscus tear and possible mild MCL sprain. Pain is rated 8-10/10. The patient was last seen in the office on 10/18/2023 where he was provided a hinged knee brace, Celebrex, and MRI study was ordered for further evaluation. The patient presents to the office today for results of his MRI study. The celebrex was not helpful, therefore he stopped taking it. He reports the knee brace helps a little bit. Pain is located at the medial aspect of the knee. He has a history of right knee arthroscopy about 11 years ago. He requests a refill of previous medication that was provided before his Left knee arthroscopy. He will look to see at home what this medication is. ROS:   General: No fever, no chills, no weight loss, no weight gain  HEENT:  No loss of hearing, no nose bleeds, no sore throat  Eyes:  No eye pain, no red eyes, no visual disturbance  Respiratory:  No cough, no shortness of breath, no wheezing  Cardiovascular:  No chest pain, no palpitations, no edema  GI: No abdominal pain, no nausea, no vomiting  Endocrine: No frequent urination, no excessive thirst  Urinary:  No dysuria, no hematuria, no incontinence  Musculoskeletal: see HPI and PE  Skin:  No rash, no wounds  Neurological:  No dizziness, no headache, no numbness  Psychiatric:  No difficulty concentrating, no depression, no suicide thoughts, no anxiety  Review of all other systems is negative    PMH:  Past Medical History:   Diagnosis Date    Enlarged prostate     Hyperlipidemia     Hypertension     Neuropathy        PSH:  Past Surgical History:   Procedure Laterality Date    KNEE ARTHROSCOPY Right     [place] ARTHROSCOPY KNEE DIAGNOSTIC W/WO SYNOVIAL BX SPX Left 6/2/2022    Procedure: Left knee arthroscopy. partial Medial menisectomy.  Condroplasty.;  Surgeon: Micki Dumas MD;  [facility];  Service: Orthopedics    [place] CYSTOURETHROSCOPY N/A 10/23/2020    Procedure: Donnal Level;  Surgeon: Cristian Way MD;  [facility];  Service: Urology    [place] CYSTOURETHROSCOPY W/INTERNAL URETHROTOMY N/A 10/23/2020    Procedure: DIRECT VISUAL INTERAL URETHROTOMY (DVIU); Surgeon: Cristian Way MD;  [facility];  Service: Urology    TRANSURETHRAL RESECTION OF PROSTATE         Medications:  Current Outpatient Medications   Medication Sig Dispense Refill    atenolol (TENORMIN) 25 mg tablet Take 1 tablet (25 mg total) by mouth daily 90 tablet 1    celecoxib (CeleBREX) 100 mg capsule Take 1 capsule (100 mg total) by mouth 2 (two) times a day 60 capsule 0    valsartan (DIOVAN) 320 MG tablet Take 1 tablet (320 mg total) by mouth daily 90 tablet 1     No current facility-administered medications for this visit. Allergies:  No Known Allergies    Family History:  Family History   Problem Relation Age of Onset    No Known Problems Mother     No Known Problems Father        Social History:  Social History     Occupational History    Not on file   Tobacco Use    Smoking status: Never    Smokeless tobacco: Never   Vaping Use    Vaping Use: Never used   Substance and Sexual Activity    Alcohol use: Never    Drug use: No    Sexual activity: Yes     Partners: Female       Physical Exam:  General :  Alert, cooperative, no distress, appears stated age  Blood pressure 157/97, pulse 77, resp. rate 18, height 5' 4" (1.626 m), weight 98 kg (216 lb), SpO2 95 %. Head:  Normocephalic, without obvious abnormality, atraumatic   Eyes:  Conjunctiva/corneas clear, EOM's intact,   Ears: Both ears normal appearance, no hearing deficits.     Nose: Nares normal, septum midline, no drainage    Neck: Supple,  trachea midline, no adenopathy, no tenderness, no mass   Back:   Symmetric, no curvature, ROM normal, no tenderness   Lungs:   Respirations unlabored   Chest Wall:  No tenderness or deformity   Extremities: Extremities normal, atraumatic, no cyanosis or edema      Pulses: 2+ and symmetric   Skin: Skin color, texture, turgor normal, no rashes or lesions      Neurologic: Normal           Ortho Exam  Right Knee   Alignment neutral   There is no effusion. There is tenderness over the medial joint line and proximal MCL insertion  Range of motion from  0 to 120. There is no crepitus with range of motion. There is 5/5 quadriceps strength and equal tone. The patient is able to perform a straight leg raise. negative patellar grind test.  Anterior drawer tests is negative  negative Lachman Test.   Posterior drawer test is  negative   Varus stress testing reveals no instability at 0 and 30 degrees   Valgus stress testing reveals no instability, mild pain at 0 and 30 degrees  Nickie's testing is positive    The patient is neurovascular intact distally. Imaging Studies: The following imaging studies were reviewed in office today. My findings are noted. MRI taken 10/27/23 of Right knee demonstrate tearing of the posterior horn of the medial meniscus. Fraying of the lateral meniscus. Thickening of the MCL. Mild tricompartmental degenerative changes. Cystic lesion in the posterior knee measuring 2.1 cm. Probable Baker's cyst. Rule out myxoid soft tissue lesion    Assessment  Encounter Diagnoses   Name Primary? Other tear of medial meniscus of right knee as current injury, initial encounter Yes    Sprain of medial collateral ligament of right knee, initial encounter     Baker's cyst of knee, right     Chronic pain of right knee            Plan:  Tom Poe is a 48 y.o. male  with right knee medial meniscus tear, MCL sprain, Baker's cyst v Myxoid lesion  The patient's MRI study was reviewed today. The natural history of a Baker's cyst was discussed today. An ultrasound study was ordered to further evaluated Baker's cyst-like structure in the posterior knee.    Discussed if the patient is not experiencing symptoms consistent with catching and locking, surgery is not warranted. It was discussed if he is having mechanical symptoms he would benefit from surgery. Discussed referral to Dr. William Peña for further evaluation, however, the patient declined and would like to stay with Dr. Jessica Mchugh  Lab work was ordered for further evaluation. The patient will call to discuss medication he would like refilled. I will see the patient back once his lab work and ultrasound have been completed.       Scribe Attestation      I,:  Reji Black am acting as a scribe while in the presence of the attending physician.:       I,:  Helio Orlando MD personally performed the services described in this documentation    as scribed in my presence.:

## 2023-12-01 ENCOUNTER — VBI (OUTPATIENT)
Dept: ADMINISTRATIVE | Facility: OTHER | Age: 53
End: 2023-12-01

## 2023-12-04 ENCOUNTER — HOSPITAL ENCOUNTER (OUTPATIENT)
Dept: ULTRASOUND IMAGING | Facility: HOSPITAL | Age: 53
Discharge: HOME | End: 2023-12-04
Attending: ORTHOPAEDIC SURGERY
Payer: COMMERCIAL

## 2023-12-04 DIAGNOSIS — M71.21 BAKER'S CYST OF KNEE, RIGHT: ICD-10-CM

## 2023-12-04 PROCEDURE — 76882 US LMTD JT/FCL EVL NVASC XTR: CPT

## 2024-01-05 ENCOUNTER — OFFICE VISIT (OUTPATIENT)
Dept: OBGYN CLINIC | Facility: CLINIC | Age: 54
End: 2024-01-05
Payer: COMMERCIAL

## 2024-01-05 VITALS
DIASTOLIC BLOOD PRESSURE: 106 MMHG | WEIGHT: 218 LBS | HEART RATE: 76 BPM | HEIGHT: 64 IN | RESPIRATION RATE: 18 BRPM | SYSTOLIC BLOOD PRESSURE: 173 MMHG | OXYGEN SATURATION: 95 % | BODY MASS INDEX: 37.22 KG/M2

## 2024-01-05 DIAGNOSIS — M71.21 BAKER'S CYST OF KNEE, RIGHT: ICD-10-CM

## 2024-01-05 DIAGNOSIS — G89.29 CHRONIC PAIN OF RIGHT KNEE: ICD-10-CM

## 2024-01-05 DIAGNOSIS — M25.561 CHRONIC PAIN OF RIGHT KNEE: ICD-10-CM

## 2024-01-05 DIAGNOSIS — M17.11 PRIMARY OSTEOARTHRITIS OF RIGHT KNEE: ICD-10-CM

## 2024-01-05 DIAGNOSIS — S83.411D SPRAIN OF MEDIAL COLLATERAL LIGAMENT OF RIGHT KNEE, SUBSEQUENT ENCOUNTER: ICD-10-CM

## 2024-01-05 DIAGNOSIS — S83.249A: Primary | ICD-10-CM

## 2024-01-05 PROCEDURE — 99213 OFFICE O/P EST LOW 20 MIN: CPT | Performed by: ORTHOPAEDIC SURGERY

## 2024-01-05 NOTE — PROGRESS NOTES
HPI:  Patient is a 53 y.o. year old male who presents for follow up of right knee suspected medial meniscus tear and ultrasound results. His pain today is rated 5/10. He notes the pain comes and goes. Patient did not get the labs that were ordered at his last visit. Patient's wife is here to assist in translation today. He has a history of right knee arthroscopy and partial menisectomy over 10 years ago. His pain is medial joint line. Worse with activity.      ROS:   General: No fever, no chills, no weight loss, no weight gain  HEENT:  No loss of hearing, no nose bleeds, no sore throat  Eyes:  No eye pain, no red eyes, no visual disturbance  Respiratory:  No cough, no shortness of breath, no wheezing  Cardiovascular:  No chest pain, no palpitations, no edema  GI: No abdominal pain, no nausea, no vomiting  Endocrine: No frequent urination, no excessive thirst  Urinary:  No dysuria, no hematuria, no incontinence  Musculoskeletal: see HPI and PE  Skin:  No rash, no wounds  Neurological:  No dizziness, no headache, no numbness  Psychiatric:  No difficulty concentrating, no depression, no suicide thoughts, no anxiety  Review of all other systems is negative    PMH:  Past Medical History:   Diagnosis Date    Enlarged prostate     Hyperlipidemia     Hypertension     Neuropathy        PSH:  Past Surgical History:   Procedure Laterality Date    KNEE ARTHROSCOPY Right     [place] ARTHROSCOPY KNEE DIAGNOSTIC W/WO SYNOVIAL BX SPX Left 6/2/2022    Procedure: Left knee arthroscopy. partial Medial menisectomy. Condroplasty.;  Surgeon: Rylie Conner MD;  [facility];  Service: Orthopedics    [place] CYSTOURETHROSCOPY N/A 10/23/2020    Procedure: CYSTOSCOPY;  Surgeon: Yves Chau MD;  [facility];  Service: Urology    [place] CYSTOURETHROSCOPY W/INTERNAL URETHROTOMY N/A 10/23/2020    Procedure: DIRECT VISUAL INTERAL URETHROTOMY (DVIU);  Surgeon: Yves Chau MD;  [facility];  Service: Urology  "   TRANSURETHRAL RESECTION OF PROSTATE         Medications:  Current Outpatient Medications   Medication Sig Dispense Refill    atenolol (TENORMIN) 25 mg tablet Take 1 tablet (25 mg total) by mouth daily 90 tablet 1    celecoxib (CeleBREX) 100 mg capsule Take 1 capsule (100 mg total) by mouth 2 (two) times a day 60 capsule 0    valsartan (DIOVAN) 320 MG tablet Take 1 tablet (320 mg total) by mouth daily 90 tablet 1     No current facility-administered medications for this visit.       Allergies:  No Known Allergies    Family History:  Family History   Problem Relation Age of Onset    No Known Problems Mother     No Known Problems Father        Social History:  Social History     Occupational History    Not on file   Tobacco Use    Smoking status: Never    Smokeless tobacco: Never   Vaping Use    Vaping status: Never Used   Substance and Sexual Activity    Alcohol use: Never    Drug use: No    Sexual activity: Yes     Partners: Female       Physical Exam:  General :  Alert, cooperative, no distress, appears stated age  Blood pressure (!) 173/106, pulse 76, resp. rate 18, height 5' 4\" (1.626 m), weight 98.9 kg (218 lb), SpO2 95%.   Head:  Normocephalic, without obvious abnormality, atraumatic   Eyes:  Conjunctiva/corneas clear, EOM's intact,   Ears:  Both ears normal appearance, no hearing deficits.    Nose: Nares normal, septum midline, no drainage    Neck: Supple,  trachea midline, no adenopathy, no tenderness, no mass   Back:   Symmetric, no curvature, ROM normal, no tenderness   Lungs:   Respirations unlabored   Chest Wall:  No tenderness or deformity   Extremities: Extremities normal, atraumatic, no cyanosis or edema      Pulses: 2+ and symmetric   Skin: Skin color, texture, turgor normal, no rashes or lesions      Neurologic: Normal           Ortho Exam  Right Knee  Range of motion from 0 to 120.    There is no crepitus with range of motion.   There is no effusion.    There is tenderness over the medial joint " line, MCL insertion  There is 5/5 quadriceps strength and equal tone.    The patient is able to perform a straight leg raise.    Negative patellar grind test.  Anterior drawer tests is negative  Negative Lachman Test.   Posterior drawer test is negative   Varus stress testing reveals no instability at 0 and 30 degrees   Valgus stress testing reveals no instability at 0 and 30 degrees  Apply grind testing is positive    The patient is neurovascular intact distally.    Imaging Studies:     The following imaging studies were reviewed in office today. My findings are noted.    Ultrasound of the popliteal space of the right knee obtained 12/04/23 demonstrates 2.5 cm anechoic lesion in posterior knee without internal vascularity.     Assessment  Encounter Diagnoses   Name Primary?    Horizontal cleavage tear of medial meniscus Yes    Sprain of medial collateral ligament of right knee, subsequent encounter     Baker's cyst of knee, right     Chronic pain of right knee     Primary osteoarthritis of right knee          Plan:  Fernando Alba is a 53 y.o. male  with right knee medial meniscus tear, MCL sprain, Baker's cyst  Ultrasound study reviewed in the office today. Cyst is not a mixoid soft tissue cyst. It is ganglion or bakers cyst.  Discussed if the patient is not experiencing symptoms consistent with catching and locking, surgery is not necessary. It was discussed if he is having mechanical symptoms he would benefit from surgery.   The patient should have the lab work done that was ordered at the last visit to evaluate for inflammatory process  Patient declined CSI today  He declined referral to Dr. Clinton today for second opinion on clean out and/or less likely meniscal repair.  He will follow up on an as needed basis    Scribe Attestation      I,:  Sheree Sorensen am acting as a scribe while in the presence of the attending physician.:       I,:  Rylie Conner MD personally performed the services  described in this documentation    as scribed in my presence.:

## 2024-02-12 DIAGNOSIS — I10 PRIMARY HYPERTENSION: ICD-10-CM

## 2024-02-12 RX ORDER — VALSARTAN 320 MG/1
320 TABLET ORAL DAILY
Qty: 10 TABLET | Refills: 0 | Status: SHIPPED | OUTPATIENT
Start: 2024-02-12 | End: 2024-02-16 | Stop reason: SDUPTHER

## 2024-02-12 RX ORDER — ATENOLOL 25 MG/1
25 TABLET ORAL DAILY
Qty: 90 TABLET | Refills: 1 | OUTPATIENT
Start: 2024-02-12

## 2024-02-12 RX ORDER — ATENOLOL 25 MG/1
25 TABLET ORAL DAILY
Qty: 10 TABLET | Refills: 0 | Status: SHIPPED | OUTPATIENT
Start: 2024-02-12 | End: 2024-02-16 | Stop reason: SDUPTHER

## 2024-02-12 RX ORDER — VALSARTAN 320 MG/1
320 TABLET ORAL DAILY
Qty: 90 TABLET | Refills: 1 | OUTPATIENT
Start: 2024-02-12

## 2024-02-12 NOTE — TELEPHONE ENCOUNTER
Patient made an appt for 02/23. Asking if a 10 day supply of his atenolol 25 mg tablet, take 1 tablet (25 mg total) by mouth daily and valsartan 320 mg tablet could be sent to the pharmacy. Did stress he needs to keep this appointment.     [facility]    Call back #[phone]  Lu/wife

## 2024-02-12 NOTE — TELEPHONE ENCOUNTER
Informed patient's wife that meds were denied due to several cancelled appointments. She mentioned that they had family emergencies and are having insurance issues.  She is working on getting an appt.

## 2024-02-16 ENCOUNTER — OFFICE VISIT (OUTPATIENT)
Dept: INTERNAL MEDICINE CLINIC | Facility: CLINIC | Age: 54
End: 2024-02-16
Payer: COMMERCIAL

## 2024-02-16 VITALS
WEIGHT: 223 LBS | SYSTOLIC BLOOD PRESSURE: 162 MMHG | DIASTOLIC BLOOD PRESSURE: 100 MMHG | OXYGEN SATURATION: 96 % | HEIGHT: 64 IN | HEART RATE: 76 BPM | BODY MASS INDEX: 38.07 KG/M2

## 2024-02-16 DIAGNOSIS — E78.2 MIXED HYPERLIPIDEMIA: ICD-10-CM

## 2024-02-16 DIAGNOSIS — I10 PRIMARY HYPERTENSION: Primary | ICD-10-CM

## 2024-02-16 DIAGNOSIS — R73.01 IMPAIRED FASTING GLUCOSE: ICD-10-CM

## 2024-02-16 PROCEDURE — 99214 OFFICE O/P EST MOD 30 MIN: CPT | Performed by: INTERNAL MEDICINE

## 2024-02-16 RX ORDER — ATENOLOL 50 MG/1
50 TABLET ORAL DAILY
Qty: 30 TABLET | Refills: 1 | Status: SHIPPED | OUTPATIENT
Start: 2024-02-16

## 2024-02-16 RX ORDER — VALSARTAN 320 MG/1
320 TABLET ORAL DAILY
Qty: 30 TABLET | Refills: 1 | Status: SHIPPED | OUTPATIENT
Start: 2024-02-16

## 2024-02-16 NOTE — PROGRESS NOTES
Assessment/Plan:     For his uncontrolled hypertension, continue Diovan 320 mg daily and double his atenolol 50 mg daily.  For the possibility of new onset diabetes, needs to repeat his labs.  For his hyperlipidemia, again, repeat labs but needs to work on diet.  Was going to have him come back again in a month but his wife tells me that their insurance is going to run out at the end of the month.  Will try and check him within 2 weeks.     Quality Measures:       Return in about 2 weeks (around 3/1/2024) for Recheck.    No problem-specific Assessment & Plan notes found for this encounter.       Diagnoses and all orders for this visit:    Primary hypertension  -     atenolol (TENORMIN) 50 mg tablet; Take 1 tablet (50 mg total) by mouth daily  -     valsartan (DIOVAN) 320 MG tablet; Take 1 tablet (320 mg total) by mouth daily  -     CBC and differential; Future  -     Comprehensive metabolic panel; Future  -     Lipid panel; Future    Impaired fasting glucose  -     Hemoglobin A1C; Future    Mixed hyperlipidemia          Subjective:      Patient ID: Fernando Alba is a 54 y.o. male.    Patient comes in today for follow-up with his wife.  He was first seen back in July and was to follow-up in 3 weeks.  He canceled numerous appointments.  Wife reported numerous problems at home.  And now insurance is going to run out at the end of the month.  He has been taking the blood pressure medicine.  The valsartan in the morning and the atenolol in the p.m.  Pressure is still not controlled.  He denies any side effects on the medicine however.  Blood work that was ordered back in July he did in October.  A1c was higher than prior readings.  Cholesterol was higher than prior readings.  They report no family history of diabetes.        ALLERGIES:  No Known Allergies    CURRENT MEDICATIONS:    Current Outpatient Medications:   •  atenolol (TENORMIN) 50 mg tablet, Take 1 tablet (50 mg total) by mouth daily, Disp: 30  tablet, Rfl: 1  •  celecoxib (CeleBREX) 100 mg capsule, Take 1 capsule (100 mg total) by mouth 2 (two) times a day, Disp: 60 capsule, Rfl: 0  •  valsartan (DIOVAN) 320 MG tablet, Take 1 tablet (320 mg total) by mouth daily, Disp: 30 tablet, Rfl: 1    ACTIVE PROBLEM LIST:  Patient Active Problem List   Diagnosis   • Prostatic disorder   • HTN (hypertension)   • BPH (benign prostatic hyperplasia)   • HLD (hyperlipidemia)   • Obesity   • Complex tear of medial meniscus of left knee   • Osteoarthritis of left knee   • Postprocedural stricture of anterior urethra   • Chronic bladder pain   • Chest pain   • Internal derangement of right knee   • Tear of medial meniscus of right knee, current, unspecified tear type, initial encounter       PAST MEDICAL HISTORY:  Past Medical History:   Diagnosis Date   • Enlarged prostate    • Hyperlipidemia    • Hypertension    • Neuropathy        PAST SURGICAL HISTORY:  Past Surgical History:   Procedure Laterality Date   • KNEE ARTHROSCOPY Right    • [place] ARTHROSCOPY KNEE DIAGNOSTIC W/WO SYNOVIAL BX SPX Left 6/2/2022    Procedure: Left knee arthroscopy. partial Medial menisectomy. Condroplasty.;  Surgeon: Rylie Conner MD;  [facility];  Service: Orthopedics   • [place] CYSTOURETHROSCOPY N/A 10/23/2020    Procedure: CYSTOSCOPY;  Surgeon: Yves Chau MD;  [facility];  Service: Urology   • [place] CYSTOURETHROSCOPY W/INTERNAL URETHROTOMY N/A 10/23/2020    Procedure: DIRECT VISUAL INTERAL URETHROTOMY (DVIU);  Surgeon: Yves Chau MD;  [facility];  Service: Urology   • TRANSURETHRAL RESECTION OF PROSTATE         FAMILY HISTORY:  Family History   Problem Relation Age of Onset   • No Known Problems Mother    • No Known Problems Father        SOCIAL HISTORY:  Social History     Socioeconomic History   • Marital status: /Civil Union     Spouse name: Not on file   • Number of children: Not on file   • Years of education: Not on file   • Highest  "education level: Not on file   Occupational History   • Not on file   Tobacco Use   • Smoking status: Never   • Smokeless tobacco: Never   Vaping Use   • Vaping status: Never Used   Substance and Sexual Activity   • Alcohol use: Never   • Drug use: No   • Sexual activity: Yes     Partners: Female   Other Topics Concern   • Not on file   Social History Narrative   • Not on file     Social Determinants of Health     Financial Resource Strain: Not on file   Food Insecurity: No Food Insecurity (3/7/2023)    Hunger Vital Sign    • Worried About Running Out of Food in the Last Year: Never true    • Ran Out of Food in the Last Year: Never true   Transportation Needs: No Transportation Needs (3/7/2023)    PRAPARE - Transportation    • Lack of Transportation (Medical): No    • Lack of Transportation (Non-Medical): No   Physical Activity: Not on file   Stress: Not on file   Social Connections: Not on file   Intimate Partner Violence: Not on file   Housing Stability: Low Risk  (3/7/2023)    Housing Stability Vital Sign    • Unable to Pay for Housing in the Last Year: No    • Number of Places Lived in the Last Year: 1    • Unstable Housing in the Last Year: No       Review of Systems   Respiratory:  Negative for shortness of breath.    Cardiovascular:  Negative for chest pain.         Objective:  Vitals:    02/16/24 1507   BP: 162/100   BP Location: Left arm   Patient Position: Sitting   Cuff Size: Large   Pulse: 76   SpO2: 96%   Weight: 101 kg (223 lb)   Height: 5' 4\" (1.626 m)     Body mass index is 38.28 kg/m².     Physical Exam  Vitals and nursing note reviewed.   Constitutional:       Appearance: Normal appearance.   Neurological:      Mental Status: He is alert.           RESULTS:  Hemoglobin A1C   Date/Time Value Ref Range Status   10/18/2023 10:01 AM 7.5 (H) Normal 4.0-5.6%; PreDiabetic 5.7-6.4%; Diabetic >=6.5%; Glycemic control for adults with diabetes <7.0% % Final   04/03/2021 09:15 AM 5.6 <5.7 % Final     Comment: "     Reference Range  Non-diabetic                     <5.7  Pre-diabetic                     5.7-6.4  Diabetic                         >=6.5  ADA target for diabetic control  <=7     Cholesterol   Date/Time Value Ref Range Status   10/18/2023 10:01  (H) See Comment mg/dL Final     Comment:     Cholesterol:         Pediatric <18 Years        Desirable          <170 mg/dL      Borderline High    170-199 mg/dL      High               >=200 mg/dL        Adult >=18 Years            Desirable        <200 mg/dL      Borderline High  200-239 mg/dL      High             >239 mg/dL       Triglycerides   Date/Time Value Ref Range Status   10/18/2023 10:01  See Comment mg/dL Final     Comment:     Triglyceride:     0-9Y            <75mg/dL     10Y-17Y         <90 mg/dL       >=18Y     Normal          <150 mg/dL     Borderline High 150-199 mg/dL     High            200-499 mg/dL        Very High       >499 mg/dL    Specimen collection should occur prior to Metamizole administration due to the potential for falsely depressed results.     HDL, Direct   Date/Time Value Ref Range Status   10/18/2023 10:01 AM 58 >=40 mg/dL Final     LDL Calculated   Date/Time Value Ref Range Status   10/18/2023 10:01  (H) 0 - 100 mg/dL Final     Comment:     LDL Cholesterol:     Optimal           <100 mg/dl     Near Optimal      100-129 mg/dl     Above Optimal       Borderline High 130-159 mg/dl       High            160-189 mg/dl       Very High       >189 mg/dl         This screening LDL is a calculated result.   It does not have the accuracy of the Direct Measured LDL in the monitoring of patients with hyperlipidemia and/or statin therapy.   Direct Measure LDL (CAP759) must be ordered separately in these patients.     Hemoglobin   Date/Time Value Ref Range Status   10/18/2023 10:01 AM 15.8 12.0 - 17.0 g/dL Final     Hematocrit   Date/Time Value Ref Range Status   10/18/2023 10:01 AM 46.3 36.5 - 49.3 % Final     Platelets    Date/Time Value Ref Range Status   10/18/2023 10:01  149 - 390 Thousands/uL Final     TSH 3RD GENERATON   Date/Time Value Ref Range Status   10/18/2023 10:01 AM 1.540 0.450 - 4.500 uIU/mL Final     Comment:     Adult TSH (3rd generation) reference range follows the recommended guidelines of the American Thyroid Association, January, 2020.     Sodium   Date/Time Value Ref Range Status   10/18/2023 10:01  135 - 147 mmol/L Final   04/03/2021 09:15  135 - 145 mmol/L Final     BUN   Date/Time Value Ref Range Status   10/18/2023 10:01 AM 17 5 - 25 mg/dL Final   04/03/2021 09:15 AM 12 7 - 28 mg/dL Final     Creatinine   Date/Time Value Ref Range Status   10/18/2023 10:01 AM 0.78 0.60 - 1.30 mg/dL Final     Comment:     Standardized to IDMS reference method   04/03/2021 09:15 AM 0.83 0.53 - 1.30 mg/dL Final      In chart    This note was created with voice recognition software.  Phonic, grammatical and spelling errors may be present within the note as a result.

## 2024-02-17 ENCOUNTER — APPOINTMENT (OUTPATIENT)
Age: 54
End: 2024-02-17
Payer: COMMERCIAL

## 2024-02-17 DIAGNOSIS — I10 PRIMARY HYPERTENSION: ICD-10-CM

## 2024-02-17 DIAGNOSIS — R73.01 IMPAIRED FASTING GLUCOSE: ICD-10-CM

## 2024-02-17 LAB
ALBUMIN SERPL BCP-MCNC: 4.6 G/DL (ref 3.5–5)
ALP SERPL-CCNC: 88 U/L (ref 34–104)
ALT SERPL W P-5'-P-CCNC: 72 U/L (ref 7–52)
ANION GAP SERPL CALCULATED.3IONS-SCNC: 8 MMOL/L
AST SERPL W P-5'-P-CCNC: 43 U/L (ref 13–39)
BASOPHILS # BLD AUTO: 0.09 THOUSANDS/ÂΜL (ref 0–0.1)
BASOPHILS NFR BLD AUTO: 1 % (ref 0–1)
BILIRUB SERPL-MCNC: 0.83 MG/DL (ref 0.2–1)
BUN SERPL-MCNC: 15 MG/DL (ref 5–25)
CALCIUM SERPL-MCNC: 9.7 MG/DL (ref 8.4–10.2)
CHLORIDE SERPL-SCNC: 100 MMOL/L (ref 96–108)
CHOLEST SERPL-MCNC: 267 MG/DL
CO2 SERPL-SCNC: 30 MMOL/L (ref 21–32)
CREAT SERPL-MCNC: 0.88 MG/DL (ref 0.6–1.3)
EOSINOPHIL # BLD AUTO: 0.35 THOUSAND/ÂΜL (ref 0–0.61)
EOSINOPHIL NFR BLD AUTO: 5 % (ref 0–6)
ERYTHROCYTE [DISTWIDTH] IN BLOOD BY AUTOMATED COUNT: 12.7 % (ref 11.6–15.1)
EST. AVERAGE GLUCOSE BLD GHB EST-MCNC: 117 MG/DL
GFR SERPL CREATININE-BSD FRML MDRD: 97 ML/MIN/1.73SQ M
GLUCOSE P FAST SERPL-MCNC: 94 MG/DL (ref 65–99)
HBA1C MFR BLD: 5.7 %
HCT VFR BLD AUTO: 50.1 % (ref 36.5–49.3)
HDLC SERPL-MCNC: 64 MG/DL
HGB BLD-MCNC: 16.6 G/DL (ref 12–17)
IMM GRANULOCYTES # BLD AUTO: 0.02 THOUSAND/UL (ref 0–0.2)
IMM GRANULOCYTES NFR BLD AUTO: 0 % (ref 0–2)
LDLC SERPL CALC-MCNC: 178 MG/DL (ref 0–100)
LYMPHOCYTES # BLD AUTO: 2.1 THOUSANDS/ÂΜL (ref 0.6–4.47)
LYMPHOCYTES NFR BLD AUTO: 31 % (ref 14–44)
MCH RBC QN AUTO: 29.3 PG (ref 26.8–34.3)
MCHC RBC AUTO-ENTMCNC: 33.1 G/DL (ref 31.4–37.4)
MCV RBC AUTO: 88 FL (ref 82–98)
MONOCYTES # BLD AUTO: 0.5 THOUSAND/ÂΜL (ref 0.17–1.22)
MONOCYTES NFR BLD AUTO: 7 % (ref 4–12)
NEUTROPHILS # BLD AUTO: 3.75 THOUSANDS/ÂΜL (ref 1.85–7.62)
NEUTS SEG NFR BLD AUTO: 56 % (ref 43–75)
NONHDLC SERPL-MCNC: 203 MG/DL
NRBC BLD AUTO-RTO: 0 /100 WBCS
PLATELET # BLD AUTO: 204 THOUSANDS/UL (ref 149–390)
PMV BLD AUTO: 12.1 FL (ref 8.9–12.7)
POTASSIUM SERPL-SCNC: 4.5 MMOL/L (ref 3.5–5.3)
PROT SERPL-MCNC: 7.4 G/DL (ref 6.4–8.4)
RBC # BLD AUTO: 5.67 MILLION/UL (ref 3.88–5.62)
SODIUM SERPL-SCNC: 138 MMOL/L (ref 135–147)
TRIGL SERPL-MCNC: 124 MG/DL
WBC # BLD AUTO: 6.81 THOUSAND/UL (ref 4.31–10.16)

## 2024-02-17 PROCEDURE — 83036 HEMOGLOBIN GLYCOSYLATED A1C: CPT

## 2024-02-17 PROCEDURE — 80053 COMPREHEN METABOLIC PANEL: CPT

## 2024-02-17 PROCEDURE — 80061 LIPID PANEL: CPT

## 2024-02-17 PROCEDURE — 36415 COLL VENOUS BLD VENIPUNCTURE: CPT

## 2024-02-17 PROCEDURE — 85025 COMPLETE CBC W/AUTO DIFF WBC: CPT

## 2024-03-15 ENCOUNTER — OFFICE VISIT (OUTPATIENT)
Dept: INTERNAL MEDICINE CLINIC | Facility: CLINIC | Age: 54
End: 2024-03-15
Payer: COMMERCIAL

## 2024-03-15 VITALS
WEIGHT: 221 LBS | HEIGHT: 64 IN | HEART RATE: 73 BPM | OXYGEN SATURATION: 93 % | DIASTOLIC BLOOD PRESSURE: 94 MMHG | BODY MASS INDEX: 37.73 KG/M2 | SYSTOLIC BLOOD PRESSURE: 150 MMHG

## 2024-03-15 DIAGNOSIS — E78.2 MIXED HYPERLIPIDEMIA: ICD-10-CM

## 2024-03-15 DIAGNOSIS — I10 PRIMARY HYPERTENSION: Primary | ICD-10-CM

## 2024-03-15 DIAGNOSIS — R73.01 IMPAIRED FASTING GLUCOSE: ICD-10-CM

## 2024-03-15 PROCEDURE — 99213 OFFICE O/P EST LOW 20 MIN: CPT | Performed by: INTERNAL MEDICINE

## 2024-03-15 RX ORDER — VALSARTAN 320 MG/1
320 TABLET ORAL DAILY
Qty: 90 TABLET | Refills: 1 | Status: SHIPPED | OUTPATIENT
Start: 2024-03-15

## 2024-03-15 RX ORDER — NIFEDIPINE 30 MG
30 TABLET, EXTENDED RELEASE ORAL DAILY
Qty: 30 TABLET | Refills: 1 | Status: SHIPPED | OUTPATIENT
Start: 2024-03-15 | End: 2024-09-11

## 2024-03-15 NOTE — PROGRESS NOTES
Assessment/Plan:     For his blood pressure, we will switch him to nifedipine 30 mg daily.  Continue valsartan 320 mg daily.  For his impaired fasting glucose, keep working on the diet.  Doing very well.  For his hyperlipidemia, will assess at next visit and consider statin.  But it sounds like he was on atorvastatin before with side effects.     Quality Measures:       Return in about 6 weeks (around 4/26/2024) for Recheck.    No problem-specific Assessment & Plan notes found for this encounter.       Diagnoses and all orders for this visit:    Primary hypertension  -     valsartan (DIOVAN) 320 MG tablet; Take 1 tablet (320 mg total) by mouth daily  -     NIFEdipine ER (ADALAT CC) 30 MG 24 hr tablet; Take 1 tablet (30 mg total) by mouth daily    Impaired fasting glucose    Mixed hyperlipidemia          Subjective:      Patient ID: Fernando Alba is a 54 y.o. male.    Patient comes in today for follow-up.  His blood pressure is better but he states that the higher dose of atenolol was bothering his stomach.  He is tolerating the valsartan.  Repeat sugar was back to his normal range.  He has made adjustments in his diet.  Not sure if the last 1 the diet was very bad at that time.        ALLERGIES:  No Known Allergies    CURRENT MEDICATIONS:    Current Outpatient Medications:   •  celecoxib (CeleBREX) 100 mg capsule, Take 1 capsule (100 mg total) by mouth 2 (two) times a day, Disp: 60 capsule, Rfl: 0  •  NIFEdipine ER (ADALAT CC) 30 MG 24 hr tablet, Take 1 tablet (30 mg total) by mouth daily, Disp: 30 tablet, Rfl: 1  •  valsartan (DIOVAN) 320 MG tablet, Take 1 tablet (320 mg total) by mouth daily, Disp: 90 tablet, Rfl: 1    ACTIVE PROBLEM LIST:  Patient Active Problem List   Diagnosis   • Prostatic disorder   • HTN (hypertension)   • BPH (benign prostatic hyperplasia)   • HLD (hyperlipidemia)   • Obesity   • Complex tear of medial meniscus of left knee   • Osteoarthritis of left knee   • Postprocedural  stricture of anterior urethra   • Chronic bladder pain   • Chest pain   • Internal derangement of right knee   • Tear of medial meniscus of right knee, current, unspecified tear type, initial encounter       PAST MEDICAL HISTORY:  Past Medical History:   Diagnosis Date   • Enlarged prostate    • Hyperlipidemia    • Hypertension    • Neuropathy        PAST SURGICAL HISTORY:  Past Surgical History:   Procedure Laterality Date   • KNEE ARTHROSCOPY Right    • [place] ARTHROSCOPY KNEE DIAGNOSTIC W/WO SYNOVIAL BX SPX Left 6/2/2022    Procedure: Left knee arthroscopy. partial Medial menisectomy. Condroplasty.;  Surgeon: Rylie Conner MD;  [facility];  Service: Orthopedics   • [place] CYSTOURETHROSCOPY N/A 10/23/2020    Procedure: CYSTOSCOPY;  Surgeon: Yves Chau MD;  [facility];  Service: Urology   • [place] CYSTOURETHROSCOPY W/INTERNAL URETHROTOMY N/A 10/23/2020    Procedure: DIRECT VISUAL INTERAL URETHROTOMY (DVIU);  Surgeon: Yves Chau MD;  [facility];  Service: Urology   • TRANSURETHRAL RESECTION OF PROSTATE         FAMILY HISTORY:  Family History   Problem Relation Age of Onset   • No Known Problems Mother    • No Known Problems Father        SOCIAL HISTORY:  Social History     Socioeconomic History   • Marital status: /Civil Union     Spouse name: Not on file   • Number of children: Not on file   • Years of education: Not on file   • Highest education level: Not on file   Occupational History   • Not on file   Tobacco Use   • Smoking status: Never   • Smokeless tobacco: Never   Vaping Use   • Vaping status: Never Used   Substance and Sexual Activity   • Alcohol use: Never   • Drug use: No   • Sexual activity: Yes     Partners: Female   Other Topics Concern   • Not on file   Social History Narrative   • Not on file     Social Determinants of Health     Financial Resource Strain: Not on file   Food Insecurity: No Food Insecurity (3/7/2023)    Hunger Vital Sign    •  "Worried About Running Out of Food in the Last Year: Never true    • Ran Out of Food in the Last Year: Never true   Transportation Needs: No Transportation Needs (3/7/2023)    PRAPARE - Transportation    • Lack of Transportation (Medical): No    • Lack of Transportation (Non-Medical): No   Physical Activity: Not on file   Stress: Not on file   Social Connections: Not on file   Intimate Partner Violence: Not on file   Housing Stability: Low Risk  (3/7/2023)    Housing Stability Vital Sign    • Unable to Pay for Housing in the Last Year: No    • Number of Places Lived in the Last Year: 1    • Unstable Housing in the Last Year: No       Review of Systems   Respiratory:  Negative for shortness of breath.    Cardiovascular:  Negative for chest pain.   Gastrointestinal:  Negative for abdominal pain.         Objective:  Vitals:    03/15/24 0911   BP: 150/94   BP Location: Left arm   Patient Position: Sitting   Cuff Size: Large   Pulse: 73   SpO2: 93%   Weight: 100 kg (221 lb)   Height: 5' 4\" (1.626 m)     Body mass index is 37.93 kg/m².     Physical Exam  Vitals and nursing note reviewed.   Constitutional:       Appearance: Normal appearance.   Neurological:      Mental Status: He is alert.   Psychiatric:         Mood and Affect: Mood normal.         Behavior: Behavior normal.           RESULTS:  Hemoglobin A1C   Date/Time Value Ref Range Status   02/17/2024 08:48 AM 5.7 (H) Normal 4.0-5.6%; PreDiabetic 5.7-6.4%; Diabetic >=6.5%; Glycemic control for adults with diabetes <7.0% % Final   04/03/2021 09:15 AM 5.6 <5.7 % Final     Comment:     Reference Range  Non-diabetic                     <5.7  Pre-diabetic                     5.7-6.4  Diabetic                         >=6.5  ADA target for diabetic control  <=7     Cholesterol   Date/Time Value Ref Range Status   02/17/2024 08:48  (H) See Comment mg/dL Final     Comment:     Cholesterol:         Pediatric <18 Years        Desirable          <170 mg/dL      Borderline " High    170-199 mg/dL      High               >=200 mg/dL        Adult >=18 Years            Desirable        <200 mg/dL      Borderline High  200-239 mg/dL      High             >239 mg/dL       Triglycerides   Date/Time Value Ref Range Status   02/17/2024 08:48  See Comment mg/dL Final     Comment:     Triglyceride:     0-9Y            <75mg/dL     10Y-17Y         <90 mg/dL       >=18Y     Normal          <150 mg/dL     Borderline High 150-199 mg/dL     High            200-499 mg/dL        Very High       >499 mg/dL    Specimen collection should occur prior to Metamizole administration due to the potential for falsely depressed results.     HDL, Direct   Date/Time Value Ref Range Status   02/17/2024 08:48 AM 64 >=40 mg/dL Final     LDL Calculated   Date/Time Value Ref Range Status   02/17/2024 08:48  (H) 0 - 100 mg/dL Final     Comment:     LDL Cholesterol:     Optimal           <100 mg/dl     Near Optimal      100-129 mg/dl     Above Optimal       Borderline High 130-159 mg/dl       High            160-189 mg/dl       Very High       >189 mg/dl         This screening LDL is a calculated result.   It does not have the accuracy of the Direct Measured LDL in the monitoring of patients with hyperlipidemia and/or statin therapy.   Direct Measure LDL (ABL729) must be ordered separately in these patients.     Hemoglobin   Date/Time Value Ref Range Status   02/17/2024 08:48 AM 16.6 12.0 - 17.0 g/dL Final     Hematocrit   Date/Time Value Ref Range Status   02/17/2024 08:48 AM 50.1 (H) 36.5 - 49.3 % Final     Platelets   Date/Time Value Ref Range Status   02/17/2024 08:48  149 - 390 Thousands/uL Final     TSH 3RD GENERATON   Date/Time Value Ref Range Status   10/18/2023 10:01 AM 1.540 0.450 - 4.500 uIU/mL Final     Comment:     Adult TSH (3rd generation) reference range follows the recommended guidelines of the American Thyroid Association, January, 2020.     Sodium   Date/Time Value Ref Range Status    02/17/2024 08:48  135 - 147 mmol/L Final   04/03/2021 09:15  135 - 145 mmol/L Final     BUN   Date/Time Value Ref Range Status   02/17/2024 08:48 AM 15 5 - 25 mg/dL Final   04/03/2021 09:15 AM 12 7 - 28 mg/dL Final     Creatinine   Date/Time Value Ref Range Status   02/17/2024 08:48 AM 0.88 0.60 - 1.30 mg/dL Final     Comment:     Standardized to IDMS reference method   04/03/2021 09:15 AM 0.83 0.53 - 1.30 mg/dL Final      In chart    This note was created with voice recognition software.  Phonic, grammatical and spelling errors may be present within the note as a result.

## 2024-05-29 ENCOUNTER — TELEPHONE (OUTPATIENT)
Age: 54
End: 2024-05-29

## 2024-05-29 DIAGNOSIS — Z12.11 SCREEN FOR COLON CANCER: Primary | ICD-10-CM

## 2024-05-29 NOTE — TELEPHONE ENCOUNTER
Patients spouse called in to request a referral for Gastroenterology for a colonoscopy for the patient.   Spouse would like the referral for     Dr. Shravan Beaver  [facility] [place]     Please advise. Please return call when referral is placed.   Thank you

## 2024-06-10 ENCOUNTER — TELEPHONE (OUTPATIENT)
Age: 54
End: 2024-06-10

## 2024-06-10 DIAGNOSIS — I10 PRIMARY HYPERTENSION: Primary | ICD-10-CM

## 2024-06-10 RX ORDER — ATENOLOL 25 MG/1
25 TABLET ORAL DAILY
Qty: 30 TABLET | Refills: 3 | Status: SHIPPED | OUTPATIENT
Start: 2024-06-10

## 2024-06-10 NOTE — TELEPHONE ENCOUNTER
Pts wife called and said pt would like to be back on the Altenolol 25 mg. The higher dose was too much for him, or if Dr Pfeiffer has a different medication he can suggest he is open to trying that. Please advise and call pts spouse.    No

## 2024-06-10 NOTE — TELEPHONE ENCOUNTER
Wife was called and informed of message form provider. Wife states pt stopped the Nifedipine (unsure how long ago) and has been taking valsartan as prescribed. Wife states pt feels Nifedipine is not working and pt wants to go back on Atenolol 25mg if possible? Wife states pt is happy with the Valsartan is looking for a second option for BP med.     Pt was scheduled for follow up first available with you that works with patients schedule was 7/18/24.

## 2024-08-26 ENCOUNTER — VBI (OUTPATIENT)
Dept: ADMINISTRATIVE | Facility: OTHER | Age: 54
End: 2024-08-26

## 2024-08-26 NOTE — TELEPHONE ENCOUNTER
08/26/24 8:33 AM     Chart reviewed for CRC: Colonoscopy ; nothing is submitted to the patient's insurance at this time.     Queenie Pompa   [facility]  
present

## 2024-09-06 DIAGNOSIS — I10 PRIMARY HYPERTENSION: ICD-10-CM

## 2024-09-06 RX ORDER — VALSARTAN 320 MG/1
320 TABLET ORAL DAILY
Qty: 90 TABLET | Refills: 1 | Status: SHIPPED | OUTPATIENT
Start: 2024-09-06

## 2024-10-16 ENCOUNTER — VBI (OUTPATIENT)
Dept: ADMINISTRATIVE | Facility: OTHER | Age: 54
End: 2024-10-16

## 2024-10-16 NOTE — TELEPHONE ENCOUNTER
10/16/24 10:35 AM     Chart reviewed for BP ; nothing is submitted to the patient's insurance at this time.     Maximiliano Patten MA   [facility]

## 2024-12-26 DIAGNOSIS — I10 PRIMARY HYPERTENSION: ICD-10-CM

## 2024-12-26 RX ORDER — ATENOLOL 25 MG/1
25 TABLET ORAL DAILY
Qty: 30 TABLET | Refills: 3 | Status: SHIPPED | OUTPATIENT
Start: 2024-12-26

## 2025-06-26 DIAGNOSIS — I10 PRIMARY HYPERTENSION: ICD-10-CM

## 2025-06-27 RX ORDER — VALSARTAN 320 MG/1
320 TABLET ORAL DAILY
Qty: 30 TABLET | Refills: 0 | Status: SHIPPED | OUTPATIENT
Start: 2025-06-27

## 2025-06-27 NOTE — TELEPHONE ENCOUNTER
Patient needs updated blood work and has previously placed orders. Please contact patient to go for labs. Courtesy refill provided.    Patient needs an appointment. Please contact the patient to schedule an appointment.

## 2025-07-22 DIAGNOSIS — I10 PRIMARY HYPERTENSION: ICD-10-CM

## 2025-07-24 RX ORDER — ATENOLOL 25 MG/1
25 TABLET ORAL DAILY
Qty: 30 TABLET | Refills: 3 | OUTPATIENT
Start: 2025-07-24

## (undated) DEVICE — BETHLEHEM UNIVERSAL  ARTHRO PK: Brand: CARDINAL HEALTH

## (undated) DEVICE — BLADE SHAVER TORPEDO CRV 4MM 13MM COOLCUT

## (undated) DEVICE — GLOVE SRG BIOGEL 9

## (undated) DEVICE — BAG URINE DRAINAGE 2000ML ANTI RFLX LF

## (undated) DEVICE — CATH SECURE FOLEY

## (undated) DEVICE — ASTOUND SURGICAL GOWN, XXX LARGE, X-LONG: Brand: CONVERTORS

## (undated) DEVICE — GLOVE SRG BIOGEL 7

## (undated) DEVICE — GAUZE SPONGES,16 PLY: Brand: CURITY

## (undated) DEVICE — SUT ETHILON 4-0 PS-2 18 IN 1667H

## (undated) DEVICE — ACE WRAP 6 IN STERILE

## (undated) DEVICE — STERILE SURGICAL LUBRICANT,  TUBE: Brand: SURGILUBE

## (undated) DEVICE — CHLORAPREP HI-LITE 26ML ORANGE

## (undated) DEVICE — CUFF TOURNIQUET 34 X 4 IN QUICK CONNECT DISP 1BLA

## (undated) DEVICE — GUIDEWIRE STRGHT TIP 0.035 IN  SOLO PLUS

## (undated) DEVICE — PROBE ABLATION  APOLLO RF 90 DEG MULTI PORT

## (undated) DEVICE — TUBING ARTHROSCOPIC WAVE  MAIN PUMP

## (undated) DEVICE — TUBING SUCTION 5MM X 12 FT

## (undated) DEVICE — PACK TUR

## (undated) DEVICE — PADDING CAST 6IN COTTON STRL

## (undated) DEVICE — SYRINGE 1ML TB 26G X 3/8 SAFETY

## (undated) DEVICE — CHLORHEXIDINE 4PCT 4 OZ

## (undated) DEVICE — LIGHT HANDLE COVER SLEEVE DISP BLUE STELLAR

## (undated) DEVICE — DRAPE EQUIPMENT RF WAND

## (undated) DEVICE — CATH FOLEY COUNCIL 20FR 5ML 2 WAY LUBRICATH

## (undated) DEVICE — PREMIUM DRY TRAY LF: Brand: MEDLINE INDUSTRIES, INC.

## (undated) DEVICE — INVIEW CLEAR LEGGINGS: Brand: CONVERTORS

## (undated) DEVICE — OCCLUSIVE GAUZE STRIP,3% BISMUTH TRIBROMOPHENATE IN PETROLATUM BLEND: Brand: XEROFORM

## (undated) DEVICE — LIGHT GLOVE GREEN

## (undated) DEVICE — SCD SEQUENTIAL COMPRESSION COMFORT SLEEVE MEDIUM KNEE LENGTH: Brand: KENDALL SCD

## (undated) DEVICE — UROCATCH BAG

## (undated) DEVICE — GLOVE INDICATOR PI UNDERGLOVE SZ 9 BLUE

## (undated) DEVICE — INTENDED FOR TISSUE SEPARATION, AND OTHER PROCEDURES THAT REQUIRE A SHARP SURGICAL BLADE TO PUNCTURE OR CUT.: Brand: BARD-PARKER SAFETY BLADES SIZE 11, STERILE